# Patient Record
Sex: FEMALE | Race: WHITE | Employment: OTHER | URBAN - METROPOLITAN AREA
[De-identification: names, ages, dates, MRNs, and addresses within clinical notes are randomized per-mention and may not be internally consistent; named-entity substitution may affect disease eponyms.]

---

## 2017-01-22 ENCOUNTER — HOSPITAL ENCOUNTER (EMERGENCY)
Age: 62
Discharge: HOME | End: 2017-01-22
Attending: EMERGENCY MEDICINE
Payer: MEDICARE

## 2017-01-22 VITALS
TEMPERATURE: 98.7 F | SYSTOLIC BLOOD PRESSURE: 168 MMHG | WEIGHT: 180 LBS | HEART RATE: 86 BPM | HEIGHT: 65 IN | OXYGEN SATURATION: 96 % | DIASTOLIC BLOOD PRESSURE: 96 MMHG | RESPIRATION RATE: 18 BRPM | BODY MASS INDEX: 29.99 KG/M2

## 2017-01-22 DIAGNOSIS — M43.6 TORTICOLLIS: Primary | ICD-10-CM

## 2017-01-22 PROCEDURE — 99282 EMERGENCY DEPT VISIT SF MDM: CPT

## 2017-01-22 PROCEDURE — 6370000000 HC RX 637 (ALT 250 FOR IP): Performed by: EMERGENCY MEDICINE

## 2017-01-22 RX ORDER — HYDROCODONE BITARTRATE AND ACETAMINOPHEN 5; 325 MG/1; MG/1
1 TABLET ORAL EVERY 6 HOURS PRN
Qty: 10 TABLET | Refills: 0 | Status: SHIPPED | OUTPATIENT
Start: 2017-01-22 | End: 2017-01-29

## 2017-01-22 RX ORDER — HYDROCODONE BITARTRATE AND ACETAMINOPHEN 5; 325 MG/1; MG/1
1 TABLET ORAL ONCE
Status: COMPLETED | OUTPATIENT
Start: 2017-01-22 | End: 2017-01-22

## 2017-01-22 RX ORDER — OMEPRAZOLE 20 MG/1
20 CAPSULE, DELAYED RELEASE ORAL DAILY
COMMUNITY

## 2017-01-22 RX ORDER — VENLAFAXINE HYDROCHLORIDE 150 MG/1
150 CAPSULE, EXTENDED RELEASE ORAL DAILY
COMMUNITY

## 2017-01-22 RX ORDER — CYCLOBENZAPRINE HCL 10 MG
5 TABLET ORAL 3 TIMES DAILY PRN
Qty: 21 TABLET | Refills: 0 | Status: SHIPPED | OUTPATIENT
Start: 2017-01-22 | End: 2017-02-01

## 2017-01-22 RX ORDER — ARIPIPRAZOLE 2 MG/1
5 TABLET ORAL 2 TIMES DAILY
COMMUNITY

## 2017-01-22 RX ORDER — IBUPROFEN 600 MG/1
600 TABLET ORAL EVERY 8 HOURS PRN
Qty: 30 TABLET | Refills: 0 | Status: ON HOLD | OUTPATIENT
Start: 2017-01-22 | End: 2021-12-29 | Stop reason: HOSPADM

## 2017-01-22 RX ORDER — LEVOTHYROXINE SODIUM 0.12 MG/1
137 TABLET ORAL DAILY
COMMUNITY

## 2017-01-22 RX ORDER — METHYLPHENIDATE HYDROCHLORIDE 10 MG/1
20 TABLET ORAL 2 TIMES DAILY
Status: ON HOLD | COMMUNITY
End: 2021-12-27

## 2017-01-22 RX ORDER — ACETAMINOPHEN 500 MG
1000 TABLET ORAL ONCE
Status: COMPLETED | OUTPATIENT
Start: 2017-01-22 | End: 2017-01-22

## 2017-01-22 RX ORDER — LORAZEPAM 1 MG/1
1 TABLET ORAL EVERY 8 HOURS PRN
Status: ON HOLD | COMMUNITY
End: 2021-12-29 | Stop reason: SDUPTHER

## 2017-01-22 RX ORDER — ETODOLAC 400 MG/1
400 TABLET, FILM COATED ORAL 2 TIMES DAILY
Status: ON HOLD | COMMUNITY
End: 2021-12-29 | Stop reason: HOSPADM

## 2017-01-22 RX ORDER — CYCLOBENZAPRINE HCL 10 MG
10 TABLET ORAL ONCE
Status: COMPLETED | OUTPATIENT
Start: 2017-01-22 | End: 2017-01-22

## 2017-01-22 RX ORDER — IBUPROFEN 600 MG/1
600 TABLET ORAL ONCE
Status: COMPLETED | OUTPATIENT
Start: 2017-01-22 | End: 2017-01-22

## 2017-01-22 RX ADMIN — HYDROCODONE BITARTATE AND ACETAMINOPHEN 1 TABLET: 5; 325 TABLET ORAL at 12:26

## 2017-01-22 RX ADMIN — IBUPROFEN 600 MG: 600 TABLET ORAL at 12:26

## 2017-01-22 RX ADMIN — ACETAMINOPHEN 1000 MG: 500 TABLET ORAL at 12:26

## 2017-01-22 RX ADMIN — CYCLOBENZAPRINE HYDROCHLORIDE 10 MG: 10 TABLET, FILM COATED ORAL at 12:26

## 2017-01-22 ASSESSMENT — PAIN SCALES - GENERAL
PAINLEVEL_OUTOF10: 9
PAINLEVEL_OUTOF10: 9

## 2017-01-22 ASSESSMENT — ENCOUNTER SYMPTOMS
DIARRHEA: 0
CONSTIPATION: 0
SHORTNESS OF BREATH: 0
ABDOMINAL PAIN: 0
TROUBLE SWALLOWING: 0
RHINORRHEA: 0
BACK PAIN: 0
COUGH: 0
NAUSEA: 0
SORE THROAT: 0
VOMITING: 0

## 2017-01-22 ASSESSMENT — PAIN DESCRIPTION - LOCATION: LOCATION: NECK

## 2017-01-22 ASSESSMENT — PAIN DESCRIPTION - FREQUENCY: FREQUENCY: CONTINUOUS

## 2017-01-22 ASSESSMENT — PAIN DESCRIPTION - PAIN TYPE: TYPE: ACUTE PAIN

## 2017-01-22 ASSESSMENT — PAIN DESCRIPTION - DESCRIPTORS: DESCRIPTORS: OTHER (COMMENT);SHARP

## 2018-04-13 ENCOUNTER — HOSPITAL ENCOUNTER (OUTPATIENT)
Dept: LAB | Age: 63
Discharge: HOME | End: 2018-04-13
Payer: MEDICARE

## 2018-04-13 LAB
CHOLESTEROL, TOTAL: 222 MG/DL (ref 0–199)
HDLC SERPL-MCNC: 41 MG/DL (ref 40–59)
HEPATITIS C ANTIBODY INTERPRETATION: NORMAL
LDL CHOLESTEROL CALCULATED: 112 MG/DL (ref 0–129)
TRIGL SERPL-MCNC: 346 MG/DL (ref 0–200)

## 2018-04-13 PROCEDURE — 86803 HEPATITIS C AB TEST: CPT

## 2018-04-13 PROCEDURE — 80061 LIPID PANEL: CPT

## 2018-04-13 PROCEDURE — 36415 COLL VENOUS BLD VENIPUNCTURE: CPT

## 2020-05-20 ENCOUNTER — HOSPITAL ENCOUNTER (OUTPATIENT)
Dept: LAB | Age: 65
Discharge: HOME | End: 2020-05-20
Payer: MEDICARE

## 2020-05-20 LAB
ALBUMIN SERPL-MCNC: 4.2 G/DL (ref 3.5–4.6)
ALP BLD-CCNC: 90 U/L (ref 40–130)
ALT SERPL-CCNC: 19 U/L (ref 0–33)
ANION GAP SERPL CALCULATED.3IONS-SCNC: 12 MEQ/L (ref 9–15)
AST SERPL-CCNC: 17 U/L (ref 0–35)
BILIRUB SERPL-MCNC: 0.3 MG/DL (ref 0.2–0.7)
BUN BLDV-MCNC: 33 MG/DL (ref 8–23)
CALCIUM SERPL-MCNC: 9.8 MG/DL (ref 8.5–9.9)
CHLORIDE BLD-SCNC: 101 MEQ/L (ref 95–107)
CHOLESTEROL, TOTAL: 274 MG/DL (ref 0–199)
CO2: 26 MEQ/L (ref 20–31)
CREAT SERPL-MCNC: 0.58 MG/DL (ref 0.5–0.9)
GFR AFRICAN AMERICAN: >60
GFR NON-AFRICAN AMERICAN: >60
GLOBULIN: 3.6 G/DL (ref 2.3–3.5)
GLUCOSE BLD-MCNC: 130 MG/DL (ref 70–99)
HCT VFR BLD CALC: 34.9 % (ref 37–47)
HDLC SERPL-MCNC: 38 MG/DL (ref 40–59)
HEMOGLOBIN: 10.8 G/DL (ref 12–16)
LDL CHOLESTEROL CALCULATED: ABNORMAL MG/DL (ref 0–129)
MCH RBC QN AUTO: 25.2 PG (ref 27–31.3)
MCHC RBC AUTO-ENTMCNC: 30.9 % (ref 33–37)
MCV RBC AUTO: 81.3 FL (ref 82–100)
PDW BLD-RTO: 19.4 % (ref 11.5–14.5)
PLATELET # BLD: 569 K/UL (ref 130–400)
POTASSIUM SERPL-SCNC: 4.7 MEQ/L (ref 3.4–4.9)
RBC # BLD: 4.3 M/UL (ref 4.2–5.4)
SODIUM BLD-SCNC: 139 MEQ/L (ref 135–144)
TOTAL PROTEIN: 7.8 G/DL (ref 6.3–8)
TRIGL SERPL-MCNC: 620 MG/DL (ref 0–150)
TSH SERPL DL<=0.05 MIU/L-ACNC: 12.91 UIU/ML (ref 0.44–3.86)
WBC # BLD: 12 K/UL (ref 4.8–10.8)

## 2020-05-20 PROCEDURE — 80053 COMPREHEN METABOLIC PANEL: CPT

## 2020-05-20 PROCEDURE — 84443 ASSAY THYROID STIM HORMONE: CPT

## 2020-05-20 PROCEDURE — 36415 COLL VENOUS BLD VENIPUNCTURE: CPT

## 2020-05-20 PROCEDURE — 85027 COMPLETE CBC AUTOMATED: CPT

## 2020-05-20 PROCEDURE — 80061 LIPID PANEL: CPT

## 2020-07-02 ENCOUNTER — HOSPITAL ENCOUNTER (OUTPATIENT)
Age: 65
Discharge: HOME | End: 2020-07-04
Payer: MEDICARE

## 2020-07-02 ENCOUNTER — HOSPITAL ENCOUNTER (OUTPATIENT)
Dept: GENERAL RADIOLOGY | Age: 65
Discharge: HOME | End: 2020-07-04
Payer: MEDICARE

## 2020-07-02 PROCEDURE — 72050 X-RAY EXAM NECK SPINE 4/5VWS: CPT

## 2020-07-02 PROCEDURE — 72072 X-RAY EXAM THORAC SPINE 3VWS: CPT

## 2020-08-27 ENCOUNTER — HOSPITAL ENCOUNTER (OUTPATIENT)
Dept: LAB | Age: 65
Discharge: HOME | End: 2020-08-27
Payer: MEDICARE

## 2020-08-27 LAB
BASOPHILS ABSOLUTE: 0.1 K/UL (ref 0–0.2)
BASOPHILS RELATIVE PERCENT: 0.7 %
EOSINOPHILS ABSOLUTE: 0.9 K/UL (ref 0–0.7)
EOSINOPHILS RELATIVE PERCENT: 7.3 %
FERRITIN: 12.5 NG/ML (ref 13–150)
HCT VFR BLD CALC: 32.6 % (ref 37–47)
HEMOGLOBIN: 9.8 G/DL (ref 12–16)
HEPATITIS C ANTIBODY INTERPRETATION: NORMAL
IRON SATURATION: 12 % (ref 11–46)
IRON: 48 UG/DL (ref 37–145)
LYMPHOCYTES ABSOLUTE: 3.2 K/UL (ref 1–4.8)
LYMPHOCYTES RELATIVE PERCENT: 24.9 %
MCH RBC QN AUTO: 24 PG (ref 27–31.3)
MCHC RBC AUTO-ENTMCNC: 30.1 % (ref 33–37)
MCV RBC AUTO: 79.7 FL (ref 82–100)
MONOCYTES ABSOLUTE: 0.7 K/UL (ref 0.2–0.8)
MONOCYTES RELATIVE PERCENT: 5.8 %
NEUTROPHILS ABSOLUTE: 7.9 K/UL (ref 1.4–6.5)
NEUTROPHILS RELATIVE PERCENT: 61.3 %
PDW BLD-RTO: 18.1 % (ref 11.5–14.5)
PLATELET # BLD: 546 K/UL (ref 130–400)
RBC # BLD: 4.09 M/UL (ref 4.2–5.4)
RETICULOCYTE ABSOLUTE COUNT: 0.07 M/CUMM (ref 0.02–0.11)
RETICULOCYTE COUNT PCT: 1.8 % (ref 0.6–2.2)
TOTAL IRON BINDING CAPACITY: 416 UG/DL (ref 178–450)
WBC # BLD: 12.8 K/UL (ref 4.8–10.8)

## 2020-08-27 PROCEDURE — 83550 IRON BINDING TEST: CPT

## 2020-08-27 PROCEDURE — 85025 COMPLETE CBC W/AUTO DIFF WBC: CPT

## 2020-08-27 PROCEDURE — 83540 ASSAY OF IRON: CPT

## 2020-08-27 PROCEDURE — 36415 COLL VENOUS BLD VENIPUNCTURE: CPT

## 2020-08-27 PROCEDURE — 85046 RETICYTE/HGB CONCENTRATE: CPT

## 2020-08-27 PROCEDURE — 86803 HEPATITIS C AB TEST: CPT

## 2020-08-27 PROCEDURE — 82728 ASSAY OF FERRITIN: CPT

## 2020-10-05 ENCOUNTER — HOSPITAL ENCOUNTER (OUTPATIENT)
Dept: WOMENS IMAGING | Age: 65
Discharge: HOME | End: 2020-10-07
Payer: MEDICARE

## 2020-10-05 PROCEDURE — 77067 SCR MAMMO BI INCL CAD: CPT

## 2020-10-05 PROCEDURE — 77063 BREAST TOMOSYNTHESIS BI: CPT

## 2021-09-29 ENCOUNTER — HOSPITAL ENCOUNTER (EMERGENCY)
Age: 66
Discharge: HOME | End: 2021-09-29
Attending: EMERGENCY MEDICINE
Payer: MEDICARE

## 2021-09-29 ENCOUNTER — APPOINTMENT (OUTPATIENT)
Dept: GENERAL RADIOLOGY | Age: 66
End: 2021-09-29
Payer: MEDICARE

## 2021-09-29 VITALS
SYSTOLIC BLOOD PRESSURE: 158 MMHG | DIASTOLIC BLOOD PRESSURE: 74 MMHG | OXYGEN SATURATION: 95 % | BODY MASS INDEX: 28.32 KG/M2 | HEIGHT: 65 IN | RESPIRATION RATE: 18 BRPM | TEMPERATURE: 98.5 F | WEIGHT: 170 LBS | HEART RATE: 68 BPM

## 2021-09-29 DIAGNOSIS — J45.21 MILD INTERMITTENT ASTHMA WITH EXACERBATION: Primary | ICD-10-CM

## 2021-09-29 LAB
ALBUMIN SERPL-MCNC: 4 G/DL (ref 3.5–4.6)
ALP BLD-CCNC: 104 U/L (ref 40–130)
ALT SERPL-CCNC: 14 U/L (ref 0–33)
ANION GAP SERPL CALCULATED.3IONS-SCNC: 13 MEQ/L (ref 9–15)
AST SERPL-CCNC: 19 U/L (ref 0–35)
BASOPHILS ABSOLUTE: 0.1 K/UL (ref 0–0.1)
BASOPHILS RELATIVE PERCENT: 0.6 % (ref 0.1–1.2)
BILIRUB SERPL-MCNC: 0.3 MG/DL (ref 0.2–0.7)
BUN BLDV-MCNC: 14 MG/DL (ref 8–23)
CALCIUM SERPL-MCNC: 9.5 MG/DL (ref 8.5–9.9)
CHLORIDE BLD-SCNC: 102 MEQ/L (ref 95–107)
CO2: 24 MEQ/L (ref 20–31)
CREAT SERPL-MCNC: 0.6 MG/DL (ref 0.5–0.9)
EKG ATRIAL RATE: 60 BPM
EKG P AXIS: 49 DEGREES
EKG P-R INTERVAL: 162 MS
EKG Q-T INTERVAL: 476 MS
EKG QRS DURATION: 134 MS
EKG QTC CALCULATION (BAZETT): 476 MS
EKG R AXIS: -61 DEGREES
EKG T AXIS: -6 DEGREES
EKG VENTRICULAR RATE: 60 BPM
EOSINOPHILS ABSOLUTE: 0.5 K/UL (ref 0–0.4)
EOSINOPHILS RELATIVE PERCENT: 4.5 % (ref 0.7–5.8)
GFR AFRICAN AMERICAN: >60
GFR NON-AFRICAN AMERICAN: >60
GLOBULIN: 3.4 G/DL (ref 2.3–3.5)
GLUCOSE BLD-MCNC: 149 MG/DL (ref 70–99)
HCT VFR BLD CALC: 38.3 % (ref 37–47)
HEMOGLOBIN: 12.4 G/DL (ref 11.2–15.7)
IMMATURE GRANULOCYTES #: 0.1 K/UL
IMMATURE GRANULOCYTES %: 0.4 %
LYMPHOCYTES ABSOLUTE: 2.4 K/UL (ref 1.2–3.7)
LYMPHOCYTES RELATIVE PERCENT: 20.7 %
MAGNESIUM: 1.7 MG/DL (ref 1.7–2.4)
MCH RBC QN AUTO: 28.9 PG (ref 25.6–32.2)
MCHC RBC AUTO-ENTMCNC: 32.4 % (ref 32.2–35.5)
MCV RBC AUTO: 89.3 FL (ref 79.4–94.8)
MONOCYTES ABSOLUTE: 0.6 K/UL (ref 0.2–0.9)
MONOCYTES RELATIVE PERCENT: 5.4 % (ref 4.7–12.5)
NEUTROPHILS ABSOLUTE: 7.9 K/UL (ref 1.6–6.1)
NEUTROPHILS RELATIVE PERCENT: 68.4 % (ref 34–71.1)
PDW BLD-RTO: 14.6 % (ref 11.7–14.4)
PLATELET # BLD: 389 K/UL (ref 182–369)
POTASSIUM SERPL-SCNC: 4 MEQ/L (ref 3.4–4.9)
RBC # BLD: 4.29 M/UL (ref 3.93–5.22)
SARS-COV-2, NAAT: NOT DETECTED
SODIUM BLD-SCNC: 139 MEQ/L (ref 135–144)
TOTAL PROTEIN: 7.4 G/DL (ref 6.3–8)
TROPONIN: <0.01 NG/ML (ref 0–0.01)
WBC # BLD: 11.6 K/UL (ref 4–10)

## 2021-09-29 PROCEDURE — 6360000002 HC RX W HCPCS: Performed by: EMERGENCY MEDICINE

## 2021-09-29 PROCEDURE — 80053 COMPREHEN METABOLIC PANEL: CPT

## 2021-09-29 PROCEDURE — 6370000000 HC RX 637 (ALT 250 FOR IP): Performed by: EMERGENCY MEDICINE

## 2021-09-29 PROCEDURE — 84484 ASSAY OF TROPONIN QUANT: CPT

## 2021-09-29 PROCEDURE — 85025 COMPLETE CBC W/AUTO DIFF WBC: CPT

## 2021-09-29 PROCEDURE — 71045 X-RAY EXAM CHEST 1 VIEW: CPT

## 2021-09-29 PROCEDURE — 2580000003 HC RX 258: Performed by: EMERGENCY MEDICINE

## 2021-09-29 PROCEDURE — 93005 ELECTROCARDIOGRAM TRACING: CPT

## 2021-09-29 PROCEDURE — 94640 AIRWAY INHALATION TREATMENT: CPT

## 2021-09-29 PROCEDURE — 96374 THER/PROPH/DIAG INJ IV PUSH: CPT

## 2021-09-29 PROCEDURE — 83735 ASSAY OF MAGNESIUM: CPT

## 2021-09-29 PROCEDURE — 36415 COLL VENOUS BLD VENIPUNCTURE: CPT

## 2021-09-29 PROCEDURE — 87635 SARS-COV-2 COVID-19 AMP PRB: CPT

## 2021-09-29 PROCEDURE — 93010 ELECTROCARDIOGRAM REPORT: CPT | Performed by: INTERNAL MEDICINE

## 2021-09-29 PROCEDURE — 99283 EMERGENCY DEPT VISIT LOW MDM: CPT

## 2021-09-29 RX ORDER — METHYLPREDNISOLONE SODIUM SUCCINATE 125 MG/2ML
125 INJECTION, POWDER, LYOPHILIZED, FOR SOLUTION INTRAMUSCULAR; INTRAVENOUS ONCE
Status: COMPLETED | OUTPATIENT
Start: 2021-09-29 | End: 2021-09-29

## 2021-09-29 RX ORDER — SODIUM CHLORIDE 9 MG/ML
INJECTION, SOLUTION INTRAVENOUS CONTINUOUS
Status: DISCONTINUED | OUTPATIENT
Start: 2021-09-29 | End: 2021-09-29 | Stop reason: HOSPADM

## 2021-09-29 RX ORDER — IPRATROPIUM BROMIDE AND ALBUTEROL SULFATE 2.5; .5 MG/3ML; MG/3ML
1 SOLUTION RESPIRATORY (INHALATION) ONCE
Status: COMPLETED | OUTPATIENT
Start: 2021-09-29 | End: 2021-09-29

## 2021-09-29 RX ORDER — PREDNISONE 20 MG/1
40 TABLET ORAL DAILY
Qty: 10 TABLET | Refills: 0 | Status: SHIPPED | OUTPATIENT
Start: 2021-09-29 | End: 2021-10-04

## 2021-09-29 RX ADMIN — SODIUM CHLORIDE: 9 INJECTION, SOLUTION INTRAVENOUS at 13:02

## 2021-09-29 RX ADMIN — METHYLPREDNISOLONE SODIUM SUCCINATE 125 MG: 125 INJECTION, POWDER, FOR SOLUTION INTRAMUSCULAR; INTRAVENOUS at 13:02

## 2021-09-29 RX ADMIN — IPRATROPIUM BROMIDE AND ALBUTEROL SULFATE 1 AMPULE: .5; 3 SOLUTION RESPIRATORY (INHALATION) at 13:01

## 2021-09-29 ASSESSMENT — ENCOUNTER SYMPTOMS
ABDOMINAL PAIN: 0
FACIAL SWELLING: 0
EYE DISCHARGE: 0
CHOKING: 0
VOMITING: 0
TROUBLE SWALLOWING: 0
VOICE CHANGE: 0
BLOOD IN STOOL: 0
CHEST TIGHTNESS: 0
SORE THROAT: 0
EYE PAIN: 0
DIARRHEA: 0
SINUS PRESSURE: 0
EYE REDNESS: 0
STRIDOR: 0
WHEEZING: 1
BACK PAIN: 0
CONSTIPATION: 0
COUGH: 1
SHORTNESS OF BREATH: 1

## 2021-09-29 ASSESSMENT — PAIN DESCRIPTION - DESCRIPTORS: DESCRIPTORS: TIGHTNESS

## 2021-09-29 ASSESSMENT — PAIN DESCRIPTION - PAIN TYPE: TYPE: ACUTE PAIN

## 2021-09-29 ASSESSMENT — PAIN DESCRIPTION - LOCATION: LOCATION: CHEST

## 2021-09-29 ASSESSMENT — PAIN SCALES - GENERAL: PAINLEVEL_OUTOF10: 6

## 2021-09-29 NOTE — ED PROVIDER NOTES
[facility] ED  eMERGENCY dEPARTMENT eNCOUnter      Pt Name: Adriano Dupont  MRN: [id number]  [place] 1955  Date of evaluation: 9/29/2021  Provider: Camryn Kelly MD    [address] [place] [address]       Chief Complaint   Patient presents with    Shortness of Breath     x1 day       HISTORY OF PRESENT ILLNESS   (Location/Symptom, Timing/Onset,Context/Setting, Quality, Duration, Modifying Factors, Severity)  Note limiting factors. Adriano Dupont is a 74 y.o. female who presents to the emergency department patient complains emergency because of the has been acting up short of breath for the last 1 day time not able to produce any phlegm feels some tightness in the chest as before no history of chest tightness chest pain on exertion history of thyroid disease migraine depression anxiety and asthma patient to receive Covid vaccination no one sick at home at this time no fever no chills no nausea no vomiting    HPI    NursingNotes were reviewed. REVIEW OF SYSTEMS    (2-9 systems for level 4, 10 or more for level 5)     Review of Systems   Constitutional: Positive for activity change. Negative for fever. HENT: Negative for congestion, drooling, facial swelling, mouth sores, nosebleeds, sinus pressure, sore throat, trouble swallowing and voice change. Eyes: Negative for pain, discharge, redness and visual disturbance. Respiratory: Positive for cough, shortness of breath and wheezing. Negative for choking, chest tightness and stridor. Cardiovascular: Positive for chest pain. Negative for palpitations and leg swelling. Gastrointestinal: Negative for abdominal pain, blood in stool, constipation, diarrhea and vomiting. Endocrine: Negative for cold intolerance, polyphagia and polyuria. Genitourinary: Negative for dysuria, flank pain, frequency, genital sores and urgency. Musculoskeletal: Negative for back pain, joint swelling, neck pain and neck stiffness. Skin: Negative for pallor and rash.    Neurological: Negative for tremors, seizures, syncope, weakness, numbness and headaches. Hematological: Negative for adenopathy. Does not bruise/bleed easily. Psychiatric/Behavioral: Negative for agitation, behavioral problems, hallucinations and sleep disturbance. The patient is not hyperactive. All other systems reviewed and are negative. Except as noted above the remainder of the review of systems was reviewed and negative. PAST MEDICAL HISTORY     Past Medical History:   Diagnosis Date    Asthma     Cerebral artery occlusion with cerebral infarction ([facility] [address]     8/2021    Depression     Migraine     Thyroid disease     hypothyroid         SURGICALHISTORY       Past Surgical History:   Procedure Laterality Date    HYSTERECTOMY      KNEE SURGERY      left knee total replacement         CURRENT MEDICATIONS       Discharge Medication List as of 9/29/2021  2:23 PM      CONTINUE these medications which have NOT CHANGED    Details   ibuprofen (ADVIL;MOTRIN) 600 MG tablet Take 1 tablet by mouth every 8 hours as needed for Pain, Disp-30 tablet, R-0      ARIPiprazole (ABILIFY) 2 MG tablet Take 2 mg by mouth 2 times daily      methylphenidate (RITALIN) 10 MG tablet Take 20 mg by mouth 2 times daily . levothyroxine (SYNTHROID) 125 MCG tablet Take 125 mcg by mouth Daily      etodolac (LODINE) 400 MG tablet Take 400 mg by mouth 2 times daily      omeprazole (PRILOSEC) 20 MG delayed release capsule Take 20 mg by mouth daily      LORazepam (ATIVAN) 1 MG tablet Take 1 mg by mouth every 8 hours as needed for Anxiety      venlafaxine (EFFEXOR XR) 150 MG extended release capsule Take 150 mg by mouth daily      fluticasone-salmeterol (ADVAIR) 250-50 MCG/DOSE AEPB Inhale 1 puff into the lungs every 12 hours             ALLERGIES     Ciprofloxacin, Sulfa antibiotics, and Zithromax [azithromycin]    FAMILY HISTORY     History reviewed. No pertinent family history.        SOCIAL HISTORY       Social History     Socioeconomic History    Marital status:      Spouse name: None    Number of children: None    Years of education: None    Highest education level: None   Occupational History    None   Tobacco Use    Smoking status: Current Every Day Smoker     Types: Cigarettes    Tobacco comment: \"a cigarette and a half a day\"   Substance and Sexual Activity    Alcohol use: No    Drug use: No    Sexual activity: None   Other Topics Concern    None   Social History Narrative    None     Social Determinants of Health     Financial Resource Strain:     Difficulty of Paying Living Expenses:    Food Insecurity:     Worried About Running Out of Food in the Last Year:     Ran Out of Food in the Last Year:    Transportation Needs:     Lack of Transportation (Medical):  Lack of Transportation (Non-Medical):    Physical Activity:     Days of Exercise per Week:     Minutes of Exercise per Session:    Stress:     Feeling of Stress :    Social Connections:     Frequency of Communication with Friends and Family:     Frequency of Social Gatherings with Friends and Family:     Attends Jain Services:     Active Member of Clubs or Organizations:     Attends Club or Organization Meetings:     Marital Status:    Intimate Partner Violence:     Fear of Current or Ex-Partner:     Emotionally Abused:     Physically Abused:     Sexually Abused:        SCREENINGS      @FLOW([id number])@      PHYSICAL EXAM    (up to 7 for level 4, 8 or more for level 5)     ED Triage Vitals [09/29/21 1246]   BP Temp Temp Source Pulse Resp SpO2 Height Weight   (!) 153/80 98.5 °F (36.9 °C) Oral 66 18 94 % 5' 5\" (1.651 m) 170 lb (77.1 kg)       Physical Exam  Constitutional:       Appearance: She is well-developed and normal weight. Interventions: She is not intubated. HENT:      Head: Normocephalic. Mouth/Throat:      Mouth: Mucous membranes are moist.   Eyes:      Pupils: Pupils are equal, round, and reactive to light.    Neck: Vascular: No JVD. Cardiovascular:      Rate and Rhythm: Normal rate. Pulses: Normal pulses. Heart sounds: Normal heart sounds. No murmur heard. No gallop. Pulmonary:      Effort: Pulmonary effort is normal. No tachypnea, bradypnea or respiratory distress. She is not intubated. Breath sounds: No stridor. Examination of the right-lower field reveals wheezing. Examination of the left-lower field reveals wheezing. Wheezing present. No rhonchi or rales. Chest:      Chest wall: No mass, deformity, tenderness, crepitus or edema. There is no dullness to percussion. Abdominal:      General: Bowel sounds are normal.      Palpations: Abdomen is soft. There is no mass. Tenderness: There is no rebound. Musculoskeletal:         General: No tenderness. Normal range of motion. Cervical back: Normal range of motion and neck supple. Lymphadenopathy:      Cervical: No cervical adenopathy. Skin:     General: Skin is warm. Capillary Refill: Capillary refill takes less than 2 seconds. Coloration: Skin is not cyanotic. Findings: No ecchymosis, erythema or rash. Neurological:      General: No focal deficit present. Mental Status: She is alert and oriented to person, place, and time. Cranial Nerves: No cranial nerve deficit. Motor: No abnormal muscle tone. Psychiatric:         Behavior: Behavior normal.         Thought Content:  Thought content normal.         DIAGNOSTIC RESULTS     EKG: All EKG's are interpreted by the Emergency Department Physician who either signs or Co-signsthis chart in the absence of a cardiologist.        RADIOLOGY:   Otilio  such as CT, Ultrasound and MRI are read by the radiologist. Plain radiographic images are visualized and preliminarily interpreted by the emergency physician with the below findings:        Interpretation per the Radiologist below, if available at the time ofthis note:    XR CHEST PORTABLE   Final Result   No radiographic evidence of acute intrathoracic process. ED BEDSIDE ULTRASOUND:   Performed by ED Physician - none    LABS:  Labs Reviewed   COMPREHENSIVE METABOLIC PANEL - Abnormal; Notable for the following components:       Result Value    Glucose 149 (*)     All other components within normal limits   CBC WITH AUTO DIFFERENTIAL - Abnormal; Notable for the following components:    WBC 11.6 (*)     RDW 14.6 (*)     Platelets 044 (*)     Neutrophils Absolute 7.9 (*)     Eosinophils Absolute 0.5 (*)     All other components within normal limits   COVID-19, RAPID   MAGNESIUM   TROPONIN       All other labs were within normal range or not returned as of this dictation. EMERGENCY DEPARTMENT COURSE and DIFFERENTIAL DIAGNOSIS/MDM:   Vitals:    Vitals:    09/29/21 1246 09/29/21 1345 09/29/21 1400   BP: (!) 153/80 (!) 143/101 (!) 158/74   Pulse: 66 60 68   Resp: 18 20 18   Temp: 98.5 °F (36.9 °C)     TempSrc: Oral     SpO2: 94% 94% 95%   Weight: 170 lb (77.1 kg)     Height: 5' 5\" (1.651 m)         MDM  Number of Diagnoses or Management Options  Diagnosis management comments: Patient with history of asthma short of breath since yesterday denies any fever chills or any productive cough no one sick at home EKG performed normal sinus rhythm right bundle branch block left anterior fascicular block rate of 60/min AL interval 162 ms QRS duration 134 ms QT interval 476 ms       Amount and/or Complexity of Data Reviewed  Clinical lab tests: ordered and reviewed  Tests in the radiology section of CPT®: ordered and reviewed        CRITICAL CARE TIME   Total Critical Care time was  minutes, excluding separately reportableprocedures. There was a high probability of clinicallysignificant/life threatening deterioration in the patient's condition which required my urgent intervention. CONSULTS:  None    PROCEDURES:  Unless otherwise noted below, none     Procedures    FINAL IMPRESSION      1.  Mild intermittent asthma with exacerbation          DISPOSITION/PLAN   DISPOSITION        PATIENT REFERRED TO:  Lizeth Crespo MD  [address] [address] [phone]    In 1 week        DISCHARGE MEDICATIONS:  Discharge Medication List as of 9/29/2021  2:23 PM      START taking these medications    Details   predniSONE (DELTASONE) 20 MG tablet Take 2 tablets by mouth daily for 5 doses, Disp-10 tablet, R-0Print                (Please note that portions of this note were completed with a voice recognition program.  Efforts were made to edit the dictations but occasionally words are mis-transcribed.)    Lisa Chaudhry MD (electronically signed)  Attending Emergency Physician       Lisa Chaudhry MD  09/29/21 6147

## 2021-12-27 ENCOUNTER — APPOINTMENT (OUTPATIENT)
Dept: GENERAL RADIOLOGY | Age: 66
DRG: 191 | End: 2021-12-27
Payer: MEDICARE

## 2021-12-27 ENCOUNTER — HOSPITAL ENCOUNTER (INPATIENT)
Age: 66
LOS: 2 days | Discharge: HOME | DRG: 191 | End: 2021-12-29
Attending: EMERGENCY MEDICINE | Admitting: INTERNAL MEDICINE
Payer: MEDICARE

## 2021-12-27 DIAGNOSIS — J45.41 MODERATE PERSISTENT ASTHMA WITH EXACERBATION: Primary | ICD-10-CM

## 2021-12-27 DIAGNOSIS — G62.9 NEUROPATHY: ICD-10-CM

## 2021-12-27 DIAGNOSIS — F41.9 ANXIETY: ICD-10-CM

## 2021-12-27 PROBLEM — J44.1 COPD EXACERBATION (HCC): Status: ACTIVE | Noted: 2021-12-27

## 2021-12-27 LAB
ALBUMIN SERPL-MCNC: 4.2 G/DL (ref 3.5–4.6)
ALP BLD-CCNC: 97 U/L (ref 40–130)
ALT SERPL-CCNC: 18 U/L (ref 0–33)
ANION GAP SERPL CALCULATED.3IONS-SCNC: 16 MEQ/L (ref 9–15)
AST SERPL-CCNC: 19 U/L (ref 0–35)
BACTERIA: ABNORMAL /HPF
BASOPHILS ABSOLUTE: 0 K/UL (ref 0–0.1)
BASOPHILS RELATIVE PERCENT: 0.3 % (ref 0.1–1.2)
BILIRUB SERPL-MCNC: 0.3 MG/DL (ref 0.2–0.7)
BILIRUBIN URINE: NEGATIVE
BLOOD, URINE: ABNORMAL
BUN BLDV-MCNC: 18 MG/DL (ref 8–23)
CALCIUM SERPL-MCNC: 9.4 MG/DL (ref 8.5–9.9)
CHLORIDE BLD-SCNC: 102 MEQ/L (ref 95–107)
CLARITY: ABNORMAL
CO2: 22 MEQ/L (ref 20–31)
COLOR: YELLOW
CREAT SERPL-MCNC: 0.45 MG/DL (ref 0.5–0.9)
EOSINOPHILS ABSOLUTE: 0.4 K/UL (ref 0–0.4)
EOSINOPHILS RELATIVE PERCENT: 3.9 % (ref 0.7–5.8)
EPITHELIAL CELLS, UA: ABNORMAL /HPF
GFR AFRICAN AMERICAN: >60
GFR NON-AFRICAN AMERICAN: >60
GLOBULIN: 3 G/DL (ref 2.3–3.5)
GLUCOSE BLD-MCNC: 120 MG/DL (ref 70–99)
GLUCOSE URINE: NEGATIVE MG/DL
HCT VFR BLD CALC: 36.9 % (ref 37–47)
HEMOGLOBIN: 12.1 G/DL (ref 11.2–15.7)
IMMATURE GRANULOCYTES #: 0.1 K/UL
IMMATURE GRANULOCYTES %: 0.4 %
INR BLD: 1
KETONES, URINE: NEGATIVE MG/DL
LEUKOCYTE ESTERASE, URINE: NEGATIVE
LYMPHOCYTES ABSOLUTE: 2 K/UL (ref 1.2–3.7)
LYMPHOCYTES RELATIVE PERCENT: 18.1 %
MAGNESIUM: 1.7 MG/DL (ref 1.7–2.4)
MCH RBC QN AUTO: 28.9 PG (ref 25.6–32.2)
MCHC RBC AUTO-ENTMCNC: 32.8 % (ref 32.2–35.5)
MCV RBC AUTO: 88.3 FL (ref 79.4–94.8)
MONOCYTES ABSOLUTE: 0.6 K/UL (ref 0.2–0.9)
MONOCYTES RELATIVE PERCENT: 5 % (ref 4.7–12.5)
NEUTROPHILS ABSOLUTE: 8.1 K/UL (ref 1.6–6.1)
NEUTROPHILS RELATIVE PERCENT: 72.3 % (ref 34–71.1)
NITRITE, URINE: POSITIVE
PDW BLD-RTO: 13.8 % (ref 11.7–14.4)
PH UA: 5.5 (ref 5–9)
PLATELET # BLD: 376 K/UL (ref 182–369)
POTASSIUM SERPL-SCNC: 3.9 MEQ/L (ref 3.4–4.9)
PRO-BNP: 247 PG/ML
PROTEIN UA: 30 MG/DL
PROTHROMBIN TIME: 13.2 SEC (ref 12.3–14.9)
RBC # BLD: 4.18 M/UL (ref 3.93–5.22)
RBC UA: ABNORMAL /HPF (ref 0–2)
SARS-COV-2, NAAT: NOT DETECTED
SODIUM BLD-SCNC: 140 MEQ/L (ref 135–144)
SPECIFIC GRAVITY UA: 1.02 (ref 1–1.03)
TOTAL PROTEIN: 7.2 G/DL (ref 6.3–8)
TROPONIN: <0.01 NG/ML (ref 0–0.01)
URINE REFLEX TO CULTURE: ABNORMAL
UROBILINOGEN, URINE: 0.2 E.U./DL
WBC # BLD: 11.2 K/UL (ref 4–10)
WBC UA: ABNORMAL /HPF (ref 0–5)

## 2021-12-27 PROCEDURE — 85610 PROTHROMBIN TIME: CPT

## 2021-12-27 PROCEDURE — 6370000000 HC RX 637 (ALT 250 FOR IP): Performed by: INTERNAL MEDICINE

## 2021-12-27 PROCEDURE — 94640 AIRWAY INHALATION TREATMENT: CPT

## 2021-12-27 PROCEDURE — 84484 ASSAY OF TROPONIN QUANT: CPT

## 2021-12-27 PROCEDURE — 99284 EMERGENCY DEPT VISIT MOD MDM: CPT

## 2021-12-27 PROCEDURE — 2580000003 HC RX 258: Performed by: EMERGENCY MEDICINE

## 2021-12-27 PROCEDURE — 81001 URINALYSIS AUTO W/SCOPE: CPT

## 2021-12-27 PROCEDURE — 1210000000 HC MED SURG R&B

## 2021-12-27 PROCEDURE — 87040 BLOOD CULTURE FOR BACTERIA: CPT

## 2021-12-27 PROCEDURE — 80053 COMPREHEN METABOLIC PANEL: CPT

## 2021-12-27 PROCEDURE — 2580000003 HC RX 258: Performed by: INTERNAL MEDICINE

## 2021-12-27 PROCEDURE — 6370000000 HC RX 637 (ALT 250 FOR IP): Performed by: EMERGENCY MEDICINE

## 2021-12-27 PROCEDURE — 83880 ASSAY OF NATRIURETIC PEPTIDE: CPT

## 2021-12-27 PROCEDURE — 2500000003 HC RX 250 WO HCPCS: Performed by: INTERNAL MEDICINE

## 2021-12-27 PROCEDURE — 96375 TX/PRO/DX INJ NEW DRUG ADDON: CPT

## 2021-12-27 PROCEDURE — 85025 COMPLETE CBC W/AUTO DIFF WBC: CPT

## 2021-12-27 PROCEDURE — 71045 X-RAY EXAM CHEST 1 VIEW: CPT

## 2021-12-27 PROCEDURE — 96374 THER/PROPH/DIAG INJ IV PUSH: CPT

## 2021-12-27 PROCEDURE — 93005 ELECTROCARDIOGRAM TRACING: CPT

## 2021-12-27 PROCEDURE — 6360000002 HC RX W HCPCS: Performed by: EMERGENCY MEDICINE

## 2021-12-27 PROCEDURE — 83735 ASSAY OF MAGNESIUM: CPT

## 2021-12-27 PROCEDURE — 36415 COLL VENOUS BLD VENIPUNCTURE: CPT

## 2021-12-27 RX ORDER — POLYETHYLENE GLYCOL 3350 17 G/17G
17 POWDER, FOR SOLUTION ORAL DAILY PRN
Status: DISCONTINUED | OUTPATIENT
Start: 2021-12-27 | End: 2021-12-29 | Stop reason: HOSPADM

## 2021-12-27 RX ORDER — GUAIFENESIN 600 MG/1
600 TABLET, EXTENDED RELEASE ORAL 3 TIMES DAILY PRN
Status: DISCONTINUED | OUTPATIENT
Start: 2021-12-27 | End: 2021-12-29 | Stop reason: HOSPADM

## 2021-12-27 RX ORDER — ALBUTEROL SULFATE 2.5 MG/3ML
2.5 SOLUTION RESPIRATORY (INHALATION) EVERY 4 HOURS PRN
Status: DISCONTINUED | OUTPATIENT
Start: 2021-12-27 | End: 2021-12-29 | Stop reason: HOSPADM

## 2021-12-27 RX ORDER — MAGNESIUM SULFATE 1 G/100ML
1000 INJECTION INTRAVENOUS ONCE
Status: COMPLETED | OUTPATIENT
Start: 2021-12-27 | End: 2021-12-27

## 2021-12-27 RX ORDER — SODIUM CHLORIDE 0.9 % (FLUSH) 0.9 %
3 SYRINGE (ML) INJECTION EVERY 8 HOURS
Status: DISCONTINUED | OUTPATIENT
Start: 2021-12-27 | End: 2021-12-29 | Stop reason: HOSPADM

## 2021-12-27 RX ORDER — IPRATROPIUM BROMIDE AND ALBUTEROL SULFATE 2.5; .5 MG/3ML; MG/3ML
1 SOLUTION RESPIRATORY (INHALATION) ONCE
Status: COMPLETED | OUTPATIENT
Start: 2021-12-27 | End: 2021-12-27

## 2021-12-27 RX ORDER — METHYLPHENIDATE HYDROCHLORIDE 5 MG/1
20 TABLET ORAL 2 TIMES DAILY
Status: DISCONTINUED | OUTPATIENT
Start: 2021-12-27 | End: 2021-12-28

## 2021-12-27 RX ORDER — PANTOPRAZOLE SODIUM 40 MG/1
40 TABLET, DELAYED RELEASE ORAL
Status: DISCONTINUED | OUTPATIENT
Start: 2021-12-28 | End: 2021-12-29 | Stop reason: HOSPADM

## 2021-12-27 RX ORDER — ONDANSETRON 2 MG/ML
4 INJECTION INTRAMUSCULAR; INTRAVENOUS ONCE
Status: COMPLETED | OUTPATIENT
Start: 2021-12-27 | End: 2021-12-27

## 2021-12-27 RX ORDER — LORAZEPAM 0.5 MG/1
0.5 TABLET ORAL EVERY 8 HOURS PRN
Status: DISCONTINUED | OUTPATIENT
Start: 2021-12-27 | End: 2021-12-29 | Stop reason: HOSPADM

## 2021-12-27 RX ORDER — SODIUM CHLORIDE 0.9 % (FLUSH) 0.9 %
10 SYRINGE (ML) INJECTION PRN
Status: DISCONTINUED | OUTPATIENT
Start: 2021-12-27 | End: 2021-12-29 | Stop reason: HOSPADM

## 2021-12-27 RX ORDER — LEVOTHYROXINE SODIUM 0.12 MG/1
125 TABLET ORAL DAILY
Status: DISCONTINUED | OUTPATIENT
Start: 2021-12-28 | End: 2021-12-29 | Stop reason: HOSPADM

## 2021-12-27 RX ORDER — ACETAMINOPHEN 325 MG/1
650 TABLET ORAL EVERY 6 HOURS PRN
Status: DISCONTINUED | OUTPATIENT
Start: 2021-12-27 | End: 2021-12-29 | Stop reason: HOSPADM

## 2021-12-27 RX ORDER — ACETAMINOPHEN 650 MG/1
650 SUPPOSITORY RECTAL EVERY 6 HOURS PRN
Status: DISCONTINUED | OUTPATIENT
Start: 2021-12-27 | End: 2021-12-29 | Stop reason: HOSPADM

## 2021-12-27 RX ORDER — METHYLPREDNISOLONE SODIUM SUCCINATE 125 MG/2ML
125 INJECTION, POWDER, LYOPHILIZED, FOR SOLUTION INTRAMUSCULAR; INTRAVENOUS ONCE
Status: COMPLETED | OUTPATIENT
Start: 2021-12-27 | End: 2021-12-27

## 2021-12-27 RX ORDER — SODIUM CHLORIDE 0.9 % (FLUSH) 0.9 %
10 SYRINGE (ML) INJECTION EVERY 12 HOURS SCHEDULED
Status: DISCONTINUED | OUTPATIENT
Start: 2021-12-27 | End: 2021-12-29 | Stop reason: HOSPADM

## 2021-12-27 RX ORDER — ARIPIPRAZOLE 2 MG/1
2 TABLET ORAL 2 TIMES DAILY
Status: DISCONTINUED | OUTPATIENT
Start: 2021-12-27 | End: 2021-12-29 | Stop reason: HOSPADM

## 2021-12-27 RX ORDER — VENLAFAXINE HYDROCHLORIDE 150 MG/1
150 CAPSULE, EXTENDED RELEASE ORAL DAILY
Status: DISCONTINUED | OUTPATIENT
Start: 2021-12-27 | End: 2021-12-29 | Stop reason: HOSPADM

## 2021-12-27 RX ORDER — SODIUM CHLORIDE 9 MG/ML
25 INJECTION, SOLUTION INTRAVENOUS PRN
Status: DISCONTINUED | OUTPATIENT
Start: 2021-12-27 | End: 2021-12-29 | Stop reason: HOSPADM

## 2021-12-27 RX ORDER — IPRATROPIUM BROMIDE AND ALBUTEROL SULFATE 2.5; .5 MG/3ML; MG/3ML
1 SOLUTION RESPIRATORY (INHALATION) 3 TIMES DAILY
Status: DISCONTINUED | OUTPATIENT
Start: 2021-12-27 | End: 2021-12-29 | Stop reason: HOSPADM

## 2021-12-27 RX ORDER — METHYLPREDNISOLONE SODIUM SUCCINATE 40 MG/ML
40 INJECTION, POWDER, LYOPHILIZED, FOR SOLUTION INTRAMUSCULAR; INTRAVENOUS EVERY 12 HOURS
Status: DISCONTINUED | OUTPATIENT
Start: 2021-12-27 | End: 2021-12-29 | Stop reason: HOSPADM

## 2021-12-27 RX ADMIN — VENLAFAXINE HYDROCHLORIDE 150 MG: 150 CAPSULE, EXTENDED RELEASE ORAL at 20:19

## 2021-12-27 RX ADMIN — DOXYCYCLINE 100 MG: 100 INJECTION, POWDER, LYOPHILIZED, FOR SOLUTION INTRAVENOUS at 21:13

## 2021-12-27 RX ADMIN — ARIPIPRAZOLE 2 MG: 2 TABLET ORAL at 20:04

## 2021-12-27 RX ADMIN — ONDANSETRON 4 MG: 2 INJECTION INTRAMUSCULAR; INTRAVENOUS at 17:08

## 2021-12-27 RX ADMIN — METHYLPREDNISOLONE SODIUM SUCCINATE 125 MG: 125 INJECTION, POWDER, FOR SOLUTION INTRAMUSCULAR; INTRAVENOUS at 17:08

## 2021-12-27 RX ADMIN — DEXTROSE MONOHYDRATE 1000 MG: 50 INJECTION, SOLUTION INTRAVENOUS at 20:16

## 2021-12-27 RX ADMIN — ACETAMINOPHEN 650 MG: 325 TABLET ORAL at 19:57

## 2021-12-27 RX ADMIN — IPRATROPIUM BROMIDE AND ALBUTEROL SULFATE 1 AMPULE: .5; 2.5 SOLUTION RESPIRATORY (INHALATION) at 18:29

## 2021-12-27 RX ADMIN — MAGNESIUM SULFATE HEPTAHYDRATE 1000 MG: 1 INJECTION, SOLUTION INTRAVENOUS at 22:21

## 2021-12-27 RX ADMIN — Medication 3 ML: at 23:29

## 2021-12-27 RX ADMIN — Medication 3 ML: at 17:08

## 2021-12-27 ASSESSMENT — ENCOUNTER SYMPTOMS
EYE PAIN: 0
EYE DISCHARGE: 0
CONSTIPATION: 0
VOMITING: 0
CHEST TIGHTNESS: 0
STRIDOR: 0
SINUS PRESSURE: 0
WHEEZING: 1
DIARRHEA: 0
EYE REDNESS: 0
BLOOD IN STOOL: 0
FACIAL SWELLING: 0
CHOKING: 0
BACK PAIN: 0
ABDOMINAL PAIN: 0
VOICE CHANGE: 0
COUGH: 1
TROUBLE SWALLOWING: 0
SHORTNESS OF BREATH: 1
SORE THROAT: 0
NAUSEA: 1

## 2021-12-27 ASSESSMENT — PAIN SCALES - GENERAL
PAINLEVEL_OUTOF10: 0
PAINLEVEL_OUTOF10: 6

## 2021-12-27 ASSESSMENT — PAIN DESCRIPTION - PAIN TYPE: TYPE: ACUTE PAIN

## 2021-12-27 ASSESSMENT — PAIN DESCRIPTION - LOCATION: LOCATION: HEAD

## 2021-12-27 NOTE — ED NOTES
Bed: 03  Expected date: 12/27/21  Expected time:   Means of arrival:   Comments:     Puma Flanagan  12/27/21 8085

## 2021-12-27 NOTE — ED PROVIDER NOTES
[facility] ED  eMERGENCY dEPARTMENT eNCOUnter      Pt Name: Ursula Byers  MRN: [id number]  [place] 1955  Date of evaluation: 12/27/2021  Provider: Dianelys Albrecht MD    [address] [place] [address]       Chief Complaint   Patient presents with    Shortness of Breath     x 4 hours, inhailer without help     HISTORY OF PRESENT ILLNESS   (Location/Symptom, Timing/Onset,Context/Setting, Quality, Duration, Modifying Factors, Severity)  Note limiting factors. Ursula Byers is a 74 y.o. female who presents to the emergency department patient come to the emergency because of increasing short of breath for the last 3 to 4 days time getting worse patient has inhaler at home no oxygen at home short of breath patient denies any chest tightness or chest pain denies any swelling of the legs document fever patient bringing up phlegm she is clear in nature patient has a history of asthma anxiety depression thyroid disease as per history  HPI    NursingNotes were reviewed. REVIEW OF SYSTEMS    (2-9 systems for level 4, 10 or more for level 5)     Review of Systems   Constitutional: Positive for activity change, appetite change, chills, diaphoresis, fatigue and fever. HENT: Positive for congestion. Negative for drooling, facial swelling, mouth sores, nosebleeds, sinus pressure, sore throat, trouble swallowing and voice change. Eyes: Negative for pain, discharge, redness and visual disturbance. Respiratory: Positive for cough, shortness of breath and wheezing. Negative for choking, chest tightness and stridor. Cardiovascular: Negative for chest pain, palpitations and leg swelling. Gastrointestinal: Positive for nausea. Negative for abdominal pain, blood in stool, constipation, diarrhea and vomiting. Endocrine: Negative for cold intolerance, polyphagia and polyuria. Genitourinary: Negative for dysuria, flank pain, frequency, genital sores and urgency.    Musculoskeletal: Negative for back pain, joint swelling, neck pain and neck stiffness. Skin: Negative for pallor and rash. Neurological: Negative for tremors, seizures, syncope, weakness, numbness and headaches. Hematological: Negative for adenopathy. Does not bruise/bleed easily. Psychiatric/Behavioral: Negative for agitation, behavioral problems, hallucinations and sleep disturbance. The patient is not hyperactive. All other systems reviewed and are negative. Except as noted above the remainder of the review of systems was reviewed and negative. PAST MEDICAL HISTORY     Past Medical History:   Diagnosis Date    Asthma     Cerebral artery occlusion with cerebral infarction ([facility] [address]     8/2021    Depression     Migraine     Thyroid disease     hypothyroid         SURGICALHISTORY       Past Surgical History:   Procedure Laterality Date    HYSTERECTOMY      KNEE SURGERY      left knee total replacement         CURRENT MEDICATIONS       Previous Medications    ARIPIPRAZOLE (ABILIFY) 2 MG TABLET    Take 2 mg by mouth 2 times daily    ETODOLAC (LODINE) 400 MG TABLET    Take 400 mg by mouth 2 times daily    FLUTICASONE-SALMETEROL (ADVAIR) 250-50 MCG/DOSE AEPB    Inhale 1 puff into the lungs every 12 hours    IBUPROFEN (ADVIL;MOTRIN) 600 MG TABLET    Take 1 tablet by mouth every 8 hours as needed for Pain    LEVOTHYROXINE (SYNTHROID) 125 MCG TABLET    Take 125 mcg by mouth Daily    LORAZEPAM (ATIVAN) 1 MG TABLET    Take 1 mg by mouth every 8 hours as needed for Anxiety    METHYLPHENIDATE (RITALIN) 10 MG TABLET    Take 20 mg by mouth 2 times daily . OMEPRAZOLE (PRILOSEC) 20 MG DELAYED RELEASE CAPSULE    Take 20 mg by mouth daily    VENLAFAXINE (EFFEXOR XR) 150 MG EXTENDED RELEASE CAPSULE    Take 150 mg by mouth daily       ALLERGIES     Ciprofloxacin, Sulfa antibiotics, and Zithromax [azithromycin]    FAMILY HISTORY     History reviewed. No pertinent family history.        SOCIAL HISTORY       Social History     Socioeconomic History    Marital status:      Spouse name: None    Number of children: None    Years of education: None    Highest education level: None   Occupational History    None   Tobacco Use    Smoking status: Current Every Day Smoker     Types: Cigarettes    Smokeless tobacco: Never Used    Tobacco comment: \"a cigarette and a half a day\"   Vaping Use    Vaping Use: Never used   Substance and Sexual Activity    Alcohol use: No    Drug use: No    Sexual activity: None   Other Topics Concern    None   Social History Narrative    None     Social Determinants of Health     Financial Resource Strain:     Difficulty of Paying Living Expenses: Not on file   Food Insecurity:     Worried About Running Out of Food in the Last Year: Not on file    Maggy of Food in the Last Year: Not on file   Transportation Needs:     Lack of Transportation (Medical): Not on file    Lack of Transportation (Non-Medical):  Not on file   Physical Activity:     Days of Exercise per Week: Not on file    Minutes of Exercise per Session: Not on file   Stress:     Feeling of Stress : Not on file   Social Connections:     Frequency of Communication with Friends and Family: Not on file    Frequency of Social Gatherings with Friends and Family: Not on file    Attends Anglican Services: Not on file    Active Member of [address] [place] [address] or Organizations: Not on file    Attends Club or Organization Meetings: Not on file    Marital Status: Not on file   Intimate Partner Violence:     Fear of Current or Ex-Partner: Not on file    Emotionally Abused: Not on file    Physically Abused: Not on file    Sexually Abused: Not on file   Housing Stability:     Unable to Pay for Housing in the Last Year: Not on file    Number of [place] in the Last Year: Not on file    Unstable Housing in the Last Year: Not on file       SCREENINGS      @FLOW([id number])@      PHYSICAL EXAM    (up to 7 for level 4, 8 or more for level 5)     ED Triage Vitals [12/27/21 1554]   BP Temp Temp Source Pulse Resp SpO2 Height Weight   (!) 118/102 96.7 °F (35.9 °C) Temporal 83 18 95 % 5' 5\" (1.651 m) 150 lb (68 kg)       Physical Exam  Vitals and nursing note reviewed. Constitutional:       General: She is not in acute distress. Appearance: She is well-developed. Comments: Alert cooperative patient looks in sound very congested nasally frequent bouts of coughing noted able to talk in full sentences   HENT:      Head: Normocephalic. Eyes:      Pupils: Pupils are equal, round, and reactive to light. Cardiovascular:      Rate and Rhythm: Normal rate and regular rhythm. Heart sounds: Normal heart sounds. No murmur heard. No gallop. Pulmonary:      Effort: Tachypnea present. No respiratory distress. Breath sounds: Examination of the right-lower field reveals wheezing. Examination of the left-lower field reveals wheezing. Decreased breath sounds and wheezing present. No rales. Chest:      Chest wall: No tenderness or edema. Abdominal:      General: Bowel sounds are normal.      Palpations: Abdomen is soft. There is no mass. Tenderness: There is no guarding or rebound. Musculoskeletal:         General: No tenderness. Normal range of motion. Cervical back: Normal range of motion and neck supple. Right lower leg: No tenderness. No edema. Left lower leg: No tenderness. No edema. Skin:     General: Skin is warm. Findings: No ecchymosis, erythema or rash. Neurological:      General: No focal deficit present. Mental Status: She is alert and oriented to person, place, and time. Cranial Nerves: No cranial nerve deficit. Motor: No abnormal muscle tone. Psychiatric:         Mood and Affect: Mood is anxious. Behavior: Behavior normal.         Thought Content:  Thought content normal.         DIAGNOSTIC RESULTS     EKG: All EKG's are interpreted by the Emergency Department Physician who either signs or Co-signsthis chart in the absence of a cardiologist.        RADIOLOGY:   Pattie August such as CT, Ultrasound and MRI are read by the radiologist. Plain radiographic images are visualized and preliminarily interpreted by the emergency physician with the below findings:        Interpretation per the Radiologist below, if available at the time ofthis note:    XR CHEST PORTABLE    (Results Pending)         ED BEDSIDE ULTRASOUND:   Performed by ED Physician - none    LABS:  Labs Reviewed   COMPREHENSIVE METABOLIC PANEL - Abnormal; Notable for the following components:       Result Value    Anion Gap 16 (*)     Glucose 120 (*)     CREATININE 0.45 (*)     All other components within normal limits   CBC WITH AUTO DIFFERENTIAL - Abnormal; Notable for the following components:    WBC 11.2 (*)     Hematocrit 36.9 (*)     Platelets 941 (*)     Neutrophils % 72.3 (*)     Neutrophils Absolute 8.1 (*)     All other components within normal limits   COVID-19, RAPID   CULTURE, BLOOD 1   CULTURE, BLOOD 2   MAGNESIUM   TROPONIN   BRAIN NATRIURETIC PEPTIDE   PROTIME-INR   URINE RT REFLEX TO CULTURE       All other labs were within normal range or not returned as of this dictation.     EMERGENCY DEPARTMENT COURSE and DIFFERENTIAL DIAGNOSIS/MDM:   Vitals:    Vitals:    12/27/21 1554 12/27/21 1831   BP: (!) 118/102    Pulse: 83    Resp: 18    Temp: 96.7 °F (35.9 °C)    TempSrc: Temporal    SpO2: 95% 98%   Weight: 150 lb (68 kg)    Height: 5' 5\" (1.651 m)        MDM  Number of Diagnoses or Management Options  Diagnosis management comments: Is asthma exacerbation wheezing bilaterally despite taking inhaler at home no oxygen at home short of breath brought her here last 3 to 4 days time productive cough EKG performed has similar EKG before in month of September this year 2021 with the bifascicular block normal sinus rhythm rate of 78/min CT interval 128 ms QRS duration 130 ms and QT interval 434 ms      CRITICAL CARE TIME   Total Critical Care time was  minutes, excluding separately reportableprocedures. There was a high probability of clinicallysignificant/life threatening deterioration in the patient's condition which required my urgent intervention. CONSULTS:  IP CONSULT TO HOSPITALIST    PROCEDURES:  Unless otherwise noted below, none     Procedures    FINAL IMPRESSION      1. Moderate persistent asthma with exacerbation          DISPOSITION/PLAN   DISPOSITION        PATIENT REFERRED TO:  No follow-up provider specified.     DISCHARGE MEDICATIONS:  New Prescriptions    No medications on file          (Please note that portions of this note were completed with a voice recognition program.  Efforts were made to edit the dictations but occasionally words are mis-transcribed.)    Karen Gatica MD (electronically signed)  Attending Emergency Physician       Karen Gatica MD  12/27/21 5734

## 2021-12-27 NOTE — ED TRIAGE NOTES
Patient states she is short of breath and has used her albuterol inhaler with out any relief. Has a dry cough. Wheezing left base and diminished right lower lobe.

## 2021-12-28 ENCOUNTER — APPOINTMENT (OUTPATIENT)
Dept: CT IMAGING | Age: 66
DRG: 191 | End: 2021-12-28
Payer: MEDICARE

## 2021-12-28 ENCOUNTER — APPOINTMENT (OUTPATIENT)
Dept: ULTRASOUND IMAGING | Age: 66
DRG: 191 | End: 2021-12-28
Payer: MEDICARE

## 2021-12-28 LAB
ANION GAP SERPL CALCULATED.3IONS-SCNC: 14 MEQ/L (ref 9–15)
BUN BLDV-MCNC: 17 MG/DL (ref 8–23)
CALCIUM SERPL-MCNC: 9.4 MG/DL (ref 8.5–9.9)
CHLORIDE BLD-SCNC: 103 MEQ/L (ref 95–107)
CO2: 23 MEQ/L (ref 20–31)
CREAT SERPL-MCNC: 0.49 MG/DL (ref 0.5–0.9)
D DIMER: 3.87 MG/L FEU (ref 0–0.5)
EKG ATRIAL RATE: 78 BPM
EKG P AXIS: 3 DEGREES
EKG P-R INTERVAL: 128 MS
EKG Q-T INTERVAL: 434 MS
EKG QRS DURATION: 130 MS
EKG QTC CALCULATION (BAZETT): 494 MS
EKG R AXIS: -61 DEGREES
EKG T AXIS: 3 DEGREES
EKG VENTRICULAR RATE: 78 BPM
GFR AFRICAN AMERICAN: >60
GFR NON-AFRICAN AMERICAN: >60
GLUCOSE BLD-MCNC: 160 MG/DL (ref 70–99)
GLUCOSE BLD-MCNC: 162 MG/DL (ref 60–115)
HCT VFR BLD CALC: 38.2 % (ref 37–47)
HEMOGLOBIN: 12.4 G/DL (ref 11.2–15.7)
MCH RBC QN AUTO: 28.6 PG (ref 25.6–32.2)
MCHC RBC AUTO-ENTMCNC: 32.5 % (ref 32.2–35.5)
MCV RBC AUTO: 88.2 FL (ref 79.4–94.8)
PDW BLD-RTO: 13.8 % (ref 11.7–14.4)
PERFORMED ON: ABNORMAL
PLATELET # BLD: 433 K/UL (ref 182–369)
POTASSIUM REFLEX MAGNESIUM: 4.3 MEQ/L (ref 3.4–4.9)
RBC # BLD: 4.33 M/UL (ref 3.93–5.22)
SODIUM BLD-SCNC: 140 MEQ/L (ref 135–144)
WBC # BLD: 13.4 K/UL (ref 4–10)

## 2021-12-28 PROCEDURE — 2580000003 HC RX 258: Performed by: EMERGENCY MEDICINE

## 2021-12-28 PROCEDURE — 2700000000 HC OXYGEN THERAPY PER DAY

## 2021-12-28 PROCEDURE — 97116 GAIT TRAINING THERAPY: CPT

## 2021-12-28 PROCEDURE — 2500000003 HC RX 250 WO HCPCS: Performed by: INTERNAL MEDICINE

## 2021-12-28 PROCEDURE — 97162 PT EVAL MOD COMPLEX 30 MIN: CPT

## 2021-12-28 PROCEDURE — 93010 ELECTROCARDIOGRAM REPORT: CPT | Performed by: INTERNAL MEDICINE

## 2021-12-28 PROCEDURE — 97166 OT EVAL MOD COMPLEX 45 MIN: CPT

## 2021-12-28 PROCEDURE — 97161 PT EVAL LOW COMPLEX 20 MIN: CPT

## 2021-12-28 PROCEDURE — 6370000000 HC RX 637 (ALT 250 FOR IP): Performed by: INTERNAL MEDICINE

## 2021-12-28 PROCEDURE — 36415 COLL VENOUS BLD VENIPUNCTURE: CPT

## 2021-12-28 PROCEDURE — 6360000004 HC RX CONTRAST MEDICATION: Performed by: INTERNAL MEDICINE

## 2021-12-28 PROCEDURE — 85027 COMPLETE CBC AUTOMATED: CPT

## 2021-12-28 PROCEDURE — 80048 BASIC METABOLIC PNL TOTAL CA: CPT

## 2021-12-28 PROCEDURE — 93970 EXTREMITY STUDY: CPT

## 2021-12-28 PROCEDURE — 85379 FIBRIN DEGRADATION QUANT: CPT

## 2021-12-28 PROCEDURE — 97530 THERAPEUTIC ACTIVITIES: CPT

## 2021-12-28 PROCEDURE — 1210000000 HC MED SURG R&B

## 2021-12-28 PROCEDURE — 2580000003 HC RX 258: Performed by: INTERNAL MEDICINE

## 2021-12-28 PROCEDURE — 6360000002 HC RX W HCPCS: Performed by: INTERNAL MEDICINE

## 2021-12-28 PROCEDURE — 71275 CT ANGIOGRAPHY CHEST: CPT

## 2021-12-28 PROCEDURE — 94640 AIRWAY INHALATION TREATMENT: CPT

## 2021-12-28 RX ORDER — FERROUS SULFATE 325(65) MG
325 TABLET ORAL
COMMUNITY

## 2021-12-28 RX ORDER — CLOPIDOGREL BISULFATE 75 MG/1
75 TABLET ORAL DAILY
COMMUNITY

## 2021-12-28 RX ORDER — LANOLIN ALCOHOL/MO/W.PET/CERES
5000 CREAM (GRAM) TOPICAL DAILY
COMMUNITY

## 2021-12-28 RX ORDER — NICOTINE 21 MG/24HR
1 PATCH, TRANSDERMAL 24 HOURS TRANSDERMAL DAILY
Status: DISCONTINUED | OUTPATIENT
Start: 2021-12-28 | End: 2021-12-29 | Stop reason: HOSPADM

## 2021-12-28 RX ORDER — LISINOPRIL 5 MG/1
5 TABLET ORAL DAILY
COMMUNITY

## 2021-12-28 RX ORDER — ASPIRIN 81 MG/1
81 TABLET, CHEWABLE ORAL DAILY
COMMUNITY

## 2021-12-28 RX ORDER — ROSUVASTATIN CALCIUM 5 MG/1
5 TABLET, COATED ORAL DAILY
Status: DISCONTINUED | OUTPATIENT
Start: 2021-12-29 | End: 2021-12-29 | Stop reason: HOSPADM

## 2021-12-28 RX ORDER — METOPROLOL SUCCINATE 50 MG/1
50 TABLET, EXTENDED RELEASE ORAL DAILY
COMMUNITY

## 2021-12-28 RX ORDER — LISINOPRIL 5 MG/1
5 TABLET ORAL DAILY
Status: DISCONTINUED | OUTPATIENT
Start: 2021-12-28 | End: 2021-12-29 | Stop reason: HOSPADM

## 2021-12-28 RX ORDER — ROSUVASTATIN CALCIUM 5 MG/1
5 TABLET, COATED ORAL NIGHTLY
Status: DISCONTINUED | OUTPATIENT
Start: 2021-12-28 | End: 2021-12-29 | Stop reason: HOSPADM

## 2021-12-28 RX ORDER — M-VIT,TX,IRON,MINS/CALC/FOLIC 27MG-0.4MG
1 TABLET ORAL DAILY
COMMUNITY

## 2021-12-28 RX ORDER — PHENOL 1.4 %
1 AEROSOL, SPRAY (ML) MUCOUS MEMBRANE DAILY
COMMUNITY

## 2021-12-28 RX ORDER — CLOPIDOGREL BISULFATE 75 MG/1
75 TABLET ORAL DAILY
Status: DISCONTINUED | OUTPATIENT
Start: 2021-12-28 | End: 2021-12-29 | Stop reason: HOSPADM

## 2021-12-28 RX ORDER — PREGABALIN 25 MG/1
50 CAPSULE ORAL DAILY
Status: DISCONTINUED | OUTPATIENT
Start: 2021-12-29 | End: 2021-12-29 | Stop reason: HOSPADM

## 2021-12-28 RX ORDER — PREGABALIN 150 MG/1
150 CAPSULE ORAL 2 TIMES DAILY
Status: ON HOLD | COMMUNITY
End: 2021-12-29 | Stop reason: SDUPTHER

## 2021-12-28 RX ORDER — ROSUVASTATIN CALCIUM 5 MG/1
5 TABLET, COATED ORAL DAILY
COMMUNITY

## 2021-12-28 RX ORDER — ASPIRIN 81 MG/1
81 TABLET, CHEWABLE ORAL DAILY
Status: DISCONTINUED | OUTPATIENT
Start: 2021-12-28 | End: 2021-12-29 | Stop reason: HOSPADM

## 2021-12-28 RX ORDER — ASPIRIN 81 MG/1
324 TABLET, CHEWABLE ORAL DAILY
Status: DISCONTINUED | OUTPATIENT
Start: 2021-12-28 | End: 2021-12-29 | Stop reason: HOSPADM

## 2021-12-28 RX ADMIN — METHYLPREDNISOLONE SODIUM SUCCINATE 40 MG: 40 INJECTION, POWDER, FOR SOLUTION INTRAMUSCULAR; INTRAVENOUS at 17:56

## 2021-12-28 RX ADMIN — LORAZEPAM 0.5 MG: 0.5 TABLET ORAL at 05:21

## 2021-12-28 RX ADMIN — ARIPIPRAZOLE 2 MG: 2 TABLET ORAL at 09:10

## 2021-12-28 RX ADMIN — ARIPIPRAZOLE 2 MG: 2 TABLET ORAL at 20:57

## 2021-12-28 RX ADMIN — IOPAMIDOL 100 ML: 755 INJECTION, SOLUTION INTRAVENOUS at 17:04

## 2021-12-28 RX ADMIN — ROSUVASTATIN CALCIUM 5 MG: 5 TABLET, COATED ORAL at 20:57

## 2021-12-28 RX ADMIN — IPRATROPIUM BROMIDE AND ALBUTEROL SULFATE 1 AMPULE: .5; 2.5 SOLUTION RESPIRATORY (INHALATION) at 18:03

## 2021-12-28 RX ADMIN — METHYLPREDNISOLONE SODIUM SUCCINATE 40 MG: 40 INJECTION, POWDER, FOR SOLUTION INTRAMUSCULAR; INTRAVENOUS at 05:21

## 2021-12-28 RX ADMIN — DOXYCYCLINE 100 MG: 100 INJECTION, POWDER, LYOPHILIZED, FOR SOLUTION INTRAVENOUS at 20:56

## 2021-12-28 RX ADMIN — ASPIRIN 81 MG 81 MG: 81 TABLET ORAL at 14:11

## 2021-12-28 RX ADMIN — IPRATROPIUM BROMIDE AND ALBUTEROL SULFATE 1 AMPULE: .5; 2.5 SOLUTION RESPIRATORY (INHALATION) at 05:19

## 2021-12-28 RX ADMIN — Medication 10 ML: at 09:11

## 2021-12-28 RX ADMIN — IPRATROPIUM BROMIDE AND ALBUTEROL SULFATE 1 AMPULE: .5; 2.5 SOLUTION RESPIRATORY (INHALATION) at 11:36

## 2021-12-28 RX ADMIN — METHYLPHENIDATE HYDROCHLORIDE 20 MG: 5 TABLET ORAL at 09:09

## 2021-12-28 RX ADMIN — LEVOTHYROXINE SODIUM 125 MCG: 0.12 TABLET ORAL at 05:21

## 2021-12-28 RX ADMIN — CLOPIDOGREL BISULFATE 75 MG: 75 TABLET ORAL at 14:10

## 2021-12-28 RX ADMIN — DOXYCYCLINE 100 MG: 100 INJECTION, POWDER, LYOPHILIZED, FOR SOLUTION INTRAVENOUS at 09:13

## 2021-12-28 RX ADMIN — Medication 3 ML: at 17:57

## 2021-12-28 RX ADMIN — Medication 3 ML: at 09:11

## 2021-12-28 RX ADMIN — PANTOPRAZOLE SODIUM 40 MG: 40 TABLET, DELAYED RELEASE ORAL at 05:21

## 2021-12-28 RX ADMIN — ACETAMINOPHEN 650 MG: 325 TABLET ORAL at 05:20

## 2021-12-28 RX ADMIN — Medication 10 ML: at 20:57

## 2021-12-28 RX ADMIN — LISINOPRIL 5 MG: 5 TABLET ORAL at 21:56

## 2021-12-28 RX ADMIN — VENLAFAXINE HYDROCHLORIDE 150 MG: 150 CAPSULE, EXTENDED RELEASE ORAL at 09:08

## 2021-12-28 RX ADMIN — LORAZEPAM 0.5 MG: 0.5 TABLET ORAL at 16:35

## 2021-12-28 RX ADMIN — ENOXAPARIN SODIUM 40 MG: 40 INJECTION SUBCUTANEOUS at 09:10

## 2021-12-28 ASSESSMENT — PAIN SCALES - GENERAL
PAINLEVEL_OUTOF10: 0
PAINLEVEL_OUTOF10: 3
PAINLEVEL_OUTOF10: 6

## 2021-12-28 ASSESSMENT — PAIN DESCRIPTION - ORIENTATION: ORIENTATION: LOWER

## 2021-12-28 ASSESSMENT — PAIN DESCRIPTION - LOCATION: LOCATION: BACK

## 2021-12-28 ASSESSMENT — PAIN DESCRIPTION - PAIN TYPE: TYPE: CHRONIC PAIN

## 2021-12-28 NOTE — PLAN OF CARE
Problem: Pain:  Description: Pain management should include both nonpharmacologic and pharmacologic interventions.   Goal: Pain level will decrease  Description: Pain level will decrease  Outcome: Met This Shift  Goal: Control of acute pain  Description: Control of acute pain  Outcome: Met This Shift  Goal: Control of chronic pain  Description: Control of chronic pain  Outcome: Met This Shift     Problem: Gas Exchange - Impaired:  Goal: Levels of oxygenation will improve  Description: Levels of oxygenation will improve  Outcome: Met This Shift

## 2021-12-28 NOTE — PROGRESS NOTES
Occupational Therapy   Occupational Therapy Initial Assessment- Med pt  Date: 2021   Patient Name: Israel Barry  MRN: [id number]     : 1955    Date of Service: 2021    Discharge Recommendations:  24 hour supervision or assist       Assessment   Performance deficits / Impairments: Decreased safe awareness;Decreased cognition  Assessment: Pt is a 67y/o female PLOF lives with sister, full hx unclear since pt with cog issues (stroke in Aug 2021), but informed was I/MI with ADL. Pt presents to [facility]* Moderate persistent asthma with exacerbation. Pt baseline for OT/with ADL but recommended SLP for cog eval.  Treatment Diagnosis: Moderate persistent asthma with exacerbation  Prognosis: Good  Decision Making: Medium Complexity  History: multi comorb  Exam: 2 perf def/impair  OT Education: OT Role  REQUIRES OT FOLLOW UP: No  Safety Devices  Safety Devices in place: Yes  Type of devices: All fall risk precautions in place           Patient Diagnosis(es): The encounter diagnosis was Moderate persistent asthma with exacerbation. has a past medical history of Asthma, Cerebral artery occlusion with cerebral infarction [facility]), Depression, Migraine, and Thyroid disease. has a past surgical history that includes Hysterectomy and knee surgery.     Treatment Diagnosis: Moderate persistent asthma with exacerbation      Restrictions  Restrictions/Precautions  Restrictions/Precautions: Fall Risk    Subjective   General  Chart Reviewed: Yes  Patient assessed for rehabilitation services?: Yes  Additional Pertinent Hx: Stroke in Aug per chart review- has had inc'd cog decline since  Family / Caregiver Present: No  Referring Practitioner: Dr. Dennie House  Diagnosis: Moderate persistent asthma with exacerbation  Subjective  Subjective: Pt agreeable to OT eval/tx  Patient Currently in Pain: Yes  Pain Assessment  Pain Assessment: 0-10  Pain Level: 6  Pain Type: Chronic pain  Pain Location: Back  Pain Orientation: Lower  Vital Signs  Patient Currently in Pain: Yes  Social/Functional History  Social/Functional History  Lives With: Family (sister)  Type of Home: Apartment  Home Layout: One level (1st floor unit)  Home Access: Stairs to enter with rails  Entrance Stairs - Number of Steps: 4  Entrance Stairs - Rails: Left (ascending)  Bathroom Shower/Tub: Walk-in shower,Shower chair with back  Bathroom Toilet: Standard  Bathroom Equipment: Grab bars in shower,Shower chair,Hand-held shower,Grab bars around toilet  Bathroom Accessibility: Not accessible  Home Equipment: 4 wheeled walker,Rolling walker  Receives Help From: Family (brother, sister)  ADL Assistance: Independent  Homemaking Responsibilities: Yes (pt did most homemaking tasks)  Ambulation Assistance: Independent (with Rollator)  Transfer Assistance: Independent  Active : Yes  Mode of Transportation: Car  Type of occupation: 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 Recommended SLP eval for cog- informed rehab director and nsg. Per nsg, they informed sister told them pt had a stroke in Aug 2021 and has had cognitive decline since.        Objective        Orientation  Overall Orientation Status: Impaired (difficult to fully assess- expressive aphasia)  Orientation Level: Disoriented to time;Disoriented to place     Functional Mobility  Functional - Mobility Device: No device  Activity: To/from bathroom  Assist Level: Modified independent   ADL  Feeding: Modified independent   Grooming: Modified independent   UE Bathing: Supervision  LE Bathing: Supervision  UE Dressing: Modified independent   LE Dressing: Supervision  Toileting: Supervision (espresses incontince in hx- sometimes)  Additional Comments: baseline with OT needs/ADL status  Tone RUE  RUE Tone: Normotonic  Tone LUE  LUE Tone: Normotonic  Coordination  Movements Are Fluid And Coordinated: Yes     Bed mobility  Supine to Sit: Independent  Sit to Supine: Independent  Transfers  Sit to stand: Modified independent  Stand to sit: Modified independent                       LUE AROM (degrees)  LUE AROM : WFL  Left Hand AROM (degrees)  Left Hand AROM: WFL  RUE AROM (degrees)  RUE AROM : WFL  Right Hand AROM (degrees)  Right Hand AROM: WFL  LUE Strength  Gross LUE Strength: WFL  L Shoulder Flex: 4/5  RUE Strength  Gross RUE Strength: WFL  R Shoulder Flex: 4/5     Hand Dominance  Hand Dominance: Right             Plan   Plan  Times per week: Initial OT eval/tx      Goals  Patient Goals   Patient goals : Unable to state goals       Therapy Time   Individual Concurrent Group Co-treatment   Time In  12:50         Time Out  1:50         Minutes  [address] [facility],

## 2021-12-28 NOTE — PROGRESS NOTES
Physical Therapy    Medical Initial Assessment    NAME: Cassidy Lopez  : 1955  MRN: [id number]    Date of Service: 2021    Patient Diagnosis(es): The encounter diagnosis was Moderate persistent asthma with exacerbation. has a past medical history of Asthma, Cerebral artery occlusion with cerebral infarction [facility]), Depression, Migraine, and Thyroid disease. has a past surgical history that includes Hysterectomy and knee surgery. Restrictions  Restrictions/Precautions  Restrictions/Precautions: Fall Risk  Vision/Hearing  Vision: Impaired  Vision Exceptions: Wears glasses for reading;Wears glasses for distance  Hearing: Within functional limits     Subjective  General  Chart Reviewed: Yes  Patient assessed for rehabilitation services?: Yes  Additional Pertinent Hx: old L knee, pt reports no memory issues before this admission- recommending cog eval as word finding prolems- charge RN made aware and will request of hospitalist MD  Family / Caregiver Present: No  Referral Date : 21  Follows Commands: Impaired (slow response time kira w verbal- expressive aphaasia? new?)  General Comment  Comments: Pt appears very anxious for eval , fidgety, pt having trouble finding words, better if given choices for responses  Subjective  Subjective: Pt reports difficulty breathing x 2 weeks- to ER.    Pain Screening  Patient Currently in Pain: No (reports choroinc back pain with prolonged stand/walk)  Vital Signs  Patient Currently in Pain: No (reports choroinc back pain with prolonged stand/walk)  Patient Observation  Observations: anxious and fidgety for eval, prolonged pause to answer questions, ? expressive aphasi- recommend ST eval, needs choices to anser PT eval qustions and demonstration for MMT  Pre Treatment Pain Screening  Pain at present: 0  Scale Used: Numeric Score  Intervention List: Patient able to continue with treatment    Orientation  Orientation  Overall Orientation Status: Impaired  Orientation Level: Oriented to situation;Oriented to person    Social/Functional History  Social/Functional History  Lives With: Family (sister , dogs and cats)  Type of Home: Apartment  Home Layout: One level  Home Access: Stairs to enter with rails  Entrance Stairs - Number of Steps: 4  Entrance Stairs - Rails: Left  Bathroom Shower/Tub: Walk-in shower,Shower chair with back  Bathroom Toilet: Standard  Bathroom Equipment: Grab bars in shower,Shower chair,Hand-held shower,Grab bars around toilet  Bathroom Accessibility: Not accessible  Home Equipment: 4 wheeled walker,Rolling walker  Receives Help From: Family  ADL Assistance: Independent  Homemaking Responsibilities: Yes  Ambulation Assistance: Independent (unsure if AD use)  Transfer Assistance: Independent  Active : Yes  Mode of Transportation: Car  Type of occupation: 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 Recommended SLP eval for cog- informed nsg. Per nsg, they informed sister told them pt had a stroke in Aug 2021 and has had cognitive decline since. Objective          AROM RLE (degrees)  RLE AROM: WFL  AROM LLE (degrees)  LLE AROM : WFL  Strength RLE  Comment: sitting hip, knee and ankle >= 4+/5  Strength LLE  Comment: sitting hip, knee and nakel >= 4+/5  Coordination  Movements Are Fluid And Coordinated: Yes  Sensation  Overall Sensation Status:  (some tingling in feet ?  chronic back pain issues)     Transfers  Sit to Stand: Supervision;Stand by assistance  Stand to sit: Supervision;Stand by assistance  Comment: no LOB  Ambulation  Ambulation?: Yes  More Ambulation?: Yes  Ambulation 1  Surface: level tile  Device: Rolling Walker  Assistance: Stand by assistance;Supervision  Quality of Gait: good pace, equal steps, no LOB  Distance: 150ft  Comments: reports fatigue post but no SOB noted  Ambulation 2  Surface - 2: level tile  Device 2: No device  Assistance 2: Stand by assistance  Quality of Gait 2: good steps, no LOB, good pace, good awareness of room 258 with min vc to find it  Distance: 150ft - reports mild SOB- more anxious than short of breath  Comments: gait belt used  Stairs/Curb  Stairs?: Yes  Stairs  # Steps : 10  Stairs Height:  (four 6 inch and six 4 inch steps)  Rails: Left ascending  Device: No Device  Assistance: Stand by assistance     Balance  Sitting - Static: Good  Sitting - Dynamic: Good  Standing - Static: Good  Standing - Dynamic: Good        Assessment   Body structures, Functions, Activity limitations: Decreased functional mobility ; Decreased safe awareness;Decreased endurance  Assessment: 66yof with asthma and COPD exacerbation, complicated by expressive aphasia since CVA in [place] 2021 per sister report Pt would benefit form skilled medical PT to gain endurance and functional mobility independence for discharge. Recommend pt have speech cog/aphasia eval and possibly OP ST at discharge if continued issues with expressive aphasia. Overall pt exhibits safety awareness, but very anxious and fidgeting throughout PT eval  Treatment Diagnosis: decreased fucntional moblity endurance with asthma/ COPD exacerbation  Patient Education: recommendation of OP ST for expressive apahsia, pt feels this might help.   REQUIRES PT FOLLOW UP: Yes  Activity Tolerance  Activity Tolerance: Patient Tolerated treatment well     Discharge Recommendations: home with PRN A, recommend OP ST for expressive aphasia, no post medical course PT needs noted         Plan   Plan  Times per week: 5-7x week  Times per day:  (1-2x per day)  Plan weeks: < 1 week medical patient  Current Treatment Recommendations: Strengthening,Endurance Training,Patient/Caregiver Education & Training,Equipment Evaluation, Education, & procurement,Gait Training,Stair training,Safety Education & Training,Home Exercise Program,Positioning         Goals  Short term goals  Time Frame for Short term goals: 2-3 days medical pt  Short term goal 1: transfers and bed mobitly odified independnet and safe  Short term goal 2: amb>= 250 ft before fstigued no AD, good pace and safety   Short term goal 3: Increase LE enurance any amount to achieve funcitoanl mobilty goals. Dock Needy term goal 4: 10 stairs L rail ascending <= supervision  Short term goal 5: Assist with discharge planning  Long term goals  Time Frame for Long term goals : n/a medical pt  Patient Goals   Patient goals : \" I need to breathe better so I could go home. \"       Therapy Time   Individual Concurrent Group Co-treatment   Time In  220         Time Out  300         Minutes  [address] [facility],[address] [facility]

## 2021-12-28 NOTE — ED NOTES
Informed nursing supervisor patient is ready to be taken to the floor.       Luly Escamilla  12/27/21 [id number]

## 2021-12-28 NOTE — PROGRESS NOTES
Pt agrees that she has a thought in her head, but her mouth will not say the same thing. Patient can  Say her own name, her sisters name, and identify drinks on table. Patient sent down at 5pm for CT scan. Will continue to monitor.

## 2021-12-28 NOTE — H&P
Hospital Medicine  History and Physical    Patient:  Amrit   MRN: [id number]    CHIEF COMPLAINT:    Chief Complaint   Patient presents with    Shortness of Breath     x 4 hours, inhailer without help       History Obtained From:  Patient, EMR  Primary Care Physician: Elizabeth Mai MD    HISTORY OF PRESENT ILLNESS:   The patient is a 74 y.o. female who came in with cough, shortness of breath and wheezing. Patient has cognitive loss and unable to process information rapidly and unable to provide consistent accurate information. I had to call her sister and verify some of the information provided. Patient is smokes half a pack daily for the last 20 years. Patient is a . Patient had a stroke in August according to her sister and since then she has had cognitive decline and difficulty processing information. No focal weakness or numbness. Patient denies any headaches    Past Medical History:      Diagnosis Date    Asthma     Cerebral artery occlusion with cerebral infarction ([facility] [address]     8/2021    Depression     Migraine     Thyroid disease     hypothyroid       Past Surgical History:      Procedure Laterality Date    HYSTERECTOMY      KNEE SURGERY      left knee total replacement       Medications Prior to Admission:    Prior to Admission medications    Medication Sig Start Date End Date Taking?  Authorizing Provider   ARIPiprazole (ABILIFY) 2 MG tablet Take 2 mg by mouth 2 times daily   Yes Historical Provider, MD   levothyroxine (SYNTHROID) 125 MCG tablet Take 125 mcg by mouth Daily   Yes Historical Provider, MD   etodolac (LODINE) 400 MG tablet Take 400 mg by mouth 2 times daily   Yes Historical Provider, MD   omeprazole (PRILOSEC) 20 MG delayed release capsule Take 20 mg by mouth daily   Yes Historical Provider, MD   LORazepam (ATIVAN) 1 MG tablet Take 1 mg by mouth every 8 hours as needed for Anxiety   Yes Historical Provider, MD   venlafaxine (EFFEXOR XR) 150 MG extended release capsule Take 150 mg by mouth daily   Yes Historical Provider, MD   fluticasone-salmeterol (ADVAIR) 250-50 MCG/DOSE AEPB Inhale 1 puff into the lungs every 12 hours   Yes Historical Provider, MD   ibuprofen (ADVIL;MOTRIN) 600 MG tablet Take 1 tablet by mouth every 8 hours as needed for Pain 1/22/17   Hortencia Thibodeaux MD       Allergies:  Ciprofloxacin, Sulfa antibiotics, and Zithromax [azithromycin]    Social History:   TOBACCO:   reports that she has been smoking cigarettes. She has never used smokeless tobacco.  ETOH:   reports no history of alcohol use. Family History:   History reviewed. No pertinent family history. REVIEW OF SYSTEMS:  Ten systems reviewed and negative except for stated in HPI    Physical Exam:    Vitals: /77   Pulse 107   Temp 97.7 °F (36.5 °C)   Resp 14   Ht 5' 5\" (1.651 m)   Wt 185 lb 4.8 oz (84.1 kg)   SpO2 96%   BMI 30.84 kg/m²   General appearance: alert, appears stated age and cooperative, moderate acute distress  Skin: Skin color, texture, turgor normal. No rashes or lesions  HEENT: Head: Normocephalic, no lesions, without obvious abnormality. Neck: no adenopathy, no carotid bruit, no JVD, supple, symmetrical, trachea midline and thyroid not enlarged, symmetric, no tenderness/mass/nodules  Lungs: Bilateral fine wheezing and rhonchi. Heart: regular rate and rhythm, S1, S2 normal, no murmur, click, rub or gallop  Abdomen: soft, non-tender; bowel sounds normal; no masses,  no organomegaly  Extremities: extremities normal, atraumatic, no cyanosis or edema  Neurologic: Mental status: Patient is awake. She is oriented to place and person but not to the exact date and location. Slight cognitive loss. Slow to process information. Slow to answer question or provide consistent accurate information. Symmetrical motor and tone.     Recent Labs     12/27/21  1708 12/28/21  0609   WBC 11.2* 13.4*   HGB 12.1 12.4   * 433*     Recent Labs     12/27/21  1708 12/28/21  0609    140   K 3.9 4.3    103   CO2 22 23   BUN 18 17   CREATININE 0.45* 0.49*   GLUCOSE 120* 160*   AST 19  --    ALT 18  --    BILITOT 0.3  --    ALKPHOS 97  --      Troponin T:   Recent Labs     12/27/21  1708   TROPONINI <0.010       ABGs: No results found for: PHART, PO2ART, NSR5LFI  INR:   Recent Labs     12/27/21  1752   INR 1.0     URINALYSIS:  Recent Labs     12/27/21  2121   COLORU Yellow   PHUR 5.5   WBCUA 3-5   RBCUA 3-5*   BACTERIA MODERATE*   CLARITYU SLCLOUDY   SPECGRAV 1.025   LEUKOCYTESUR Negative   UROBILINOGEN 0.2   BILIRUBINUR Negative   BLOODU Small   GLUCOSEU Negative     -----------------------------------------------------------------   XR CHEST PORTABLE    Result Date: 12/27/2021  Exam: XR CHEST PORTABLE History:  sob Technique: AP portable view of the chest obtained. Comparison: none Chest x-ray portable Findings: The cardiomediastinal silhouette is within normal limits. There are no infiltrates, consolidations or effusions. Bones of the thorax appear intact. No radiographic evidence of acute intrathoracic process. Assessment and Plan     *Acute COPD exacerbation, this is associated with frequent cough and anterior chest wall pain and discomfort rule out other possible etiologies. *Acute bronchitis, rule out developing pneumonia. *Active tobacco addiction with a long history of asthma. I suspect that the patient has a combination asthma with emphysema    *History of stroke which had left patient with cognitive decline. Patient is a slow to process information and give accurate and consistent information. I called her Sister Eleni Jefferson who stated that patient has had this since her stroke in August.    *Hypothyroidism. *Hypertension. *Few other medical conditions not listed above. Plan:  I had accepted to admit the patient to the medical floor. I started the patient on Rocephin, doxycycline, Solu-Medrol, albuterol and Atrovent.     Will request EKG and telemetry monitoring. I requested D-dimer. If D-dimer is positive I will proceed with CTA of the chest to rule out PE. If D-dimer is negative I will request CT with regular contrast.    Resume her preadmission home medications after verification. Patient is on aspirin and Plavix according to the sister that are not listed on her home medication. Monitor her neurological status over the next 24 to 48 hours. To proceed with additional neurological work-up if clinically indicated. Patient Active Problem List   Diagnosis Code    COPD exacerbation ([facility] [address] Peggy Moses MD, MD  Admitting Hospitalist    TTS: 85mins where I focused more than 75% of my attention on rendering care, and planning treatment course for this patient, in addition to talking to RN team, mid levels, consulting with other physicians and following up on labs and imaging. High Risk Readmission Screening Tool Score Noted.      Emergency Contact:

## 2021-12-29 VITALS
BODY MASS INDEX: 30.87 KG/M2 | DIASTOLIC BLOOD PRESSURE: 85 MMHG | WEIGHT: 185.3 LBS | OXYGEN SATURATION: 100 % | HEIGHT: 65 IN | SYSTOLIC BLOOD PRESSURE: 144 MMHG | RESPIRATION RATE: 18 BRPM | HEART RATE: 87 BPM | TEMPERATURE: 97.8 F

## 2021-12-29 PROCEDURE — 97116 GAIT TRAINING THERAPY: CPT

## 2021-12-29 PROCEDURE — 6360000002 HC RX W HCPCS: Performed by: INTERNAL MEDICINE

## 2021-12-29 PROCEDURE — 6370000000 HC RX 637 (ALT 250 FOR IP): Performed by: INTERNAL MEDICINE

## 2021-12-29 PROCEDURE — 2580000003 HC RX 258: Performed by: INTERNAL MEDICINE

## 2021-12-29 PROCEDURE — 97530 THERAPEUTIC ACTIVITIES: CPT

## 2021-12-29 PROCEDURE — 2500000003 HC RX 250 WO HCPCS: Performed by: INTERNAL MEDICINE

## 2021-12-29 PROCEDURE — 94640 AIRWAY INHALATION TREATMENT: CPT

## 2021-12-29 RX ORDER — PREDNISONE 10 MG/1
10 TABLET ORAL SEE ADMIN INSTRUCTIONS
Qty: 18 TABLET | Refills: 0 | Status: SHIPPED | OUTPATIENT
Start: 2021-12-29 | End: 2022-01-03

## 2021-12-29 RX ORDER — LORAZEPAM 1 MG/1
0.5 TABLET ORAL EVERY 8 HOURS PRN
COMMUNITY
Start: 2021-12-29

## 2021-12-29 RX ORDER — ALBUTEROL SULFATE 90 UG/1
2 AEROSOL, METERED RESPIRATORY (INHALATION) 4 TIMES DAILY PRN
Qty: 54 G | Refills: 1 | Status: SHIPPED | OUTPATIENT
Start: 2021-12-29

## 2021-12-29 RX ORDER — GUAIFENESIN 600 MG/1
600 TABLET, EXTENDED RELEASE ORAL 3 TIMES DAILY PRN
Qty: 21 TABLET | Refills: 0 | Status: SHIPPED | OUTPATIENT
Start: 2021-12-29

## 2021-12-29 RX ORDER — DOXYCYCLINE HYCLATE 100 MG
100 TABLET ORAL 2 TIMES DAILY
Qty: 10 TABLET | Refills: 0 | Status: SHIPPED | OUTPATIENT
Start: 2021-12-29 | End: 2022-01-03

## 2021-12-29 RX ORDER — NICOTINE 21 MG/24HR
1 PATCH, TRANSDERMAL 24 HOURS TRANSDERMAL DAILY
Qty: 14 PATCH | Refills: 0 | Status: SHIPPED | OUTPATIENT
Start: 2021-12-30

## 2021-12-29 RX ORDER — PREGABALIN 150 MG/1
150 CAPSULE ORAL DAILY
Qty: 60 CAPSULE | Refills: 3 | COMMUNITY
Start: 2021-12-29

## 2021-12-29 RX ADMIN — ACETAMINOPHEN 650 MG: 325 TABLET ORAL at 07:25

## 2021-12-29 RX ADMIN — Medication 10 ML: at 07:27

## 2021-12-29 RX ADMIN — CLOPIDOGREL BISULFATE 75 MG: 75 TABLET ORAL at 07:27

## 2021-12-29 RX ADMIN — METHYLPREDNISOLONE SODIUM SUCCINATE 40 MG: 40 INJECTION, POWDER, FOR SOLUTION INTRAMUSCULAR; INTRAVENOUS at 05:39

## 2021-12-29 RX ADMIN — PANTOPRAZOLE SODIUM 40 MG: 40 TABLET, DELAYED RELEASE ORAL at 05:39

## 2021-12-29 RX ADMIN — DOXYCYCLINE 100 MG: 100 INJECTION, POWDER, LYOPHILIZED, FOR SOLUTION INTRAVENOUS at 07:28

## 2021-12-29 RX ADMIN — LISINOPRIL 5 MG: 5 TABLET ORAL at 07:29

## 2021-12-29 RX ADMIN — LORAZEPAM 0.5 MG: 0.5 TABLET ORAL at 05:44

## 2021-12-29 RX ADMIN — VENLAFAXINE HYDROCHLORIDE 150 MG: 150 CAPSULE, EXTENDED RELEASE ORAL at 07:27

## 2021-12-29 RX ADMIN — ARIPIPRAZOLE 2 MG: 2 TABLET ORAL at 07:26

## 2021-12-29 RX ADMIN — ENOXAPARIN SODIUM 40 MG: 40 INJECTION SUBCUTANEOUS at 07:27

## 2021-12-29 RX ADMIN — PREGABALIN 50 MG: 25 CAPSULE ORAL at 07:26

## 2021-12-29 RX ADMIN — ASPIRIN 81 MG 81 MG: 81 TABLET ORAL at 07:26

## 2021-12-29 RX ADMIN — IPRATROPIUM BROMIDE AND ALBUTEROL SULFATE 1 AMPULE: .5; 2.5 SOLUTION RESPIRATORY (INHALATION) at 12:44

## 2021-12-29 RX ADMIN — LEVOTHYROXINE SODIUM 125 MCG: 0.12 TABLET ORAL at 05:39

## 2021-12-29 RX ADMIN — IPRATROPIUM BROMIDE AND ALBUTEROL SULFATE 1 AMPULE: .5; 2.5 SOLUTION RESPIRATORY (INHALATION) at 05:49

## 2021-12-29 RX ADMIN — Medication 10 ML: at 05:41

## 2021-12-29 ASSESSMENT — PAIN SCALES - GENERAL
PAINLEVEL_OUTOF10: 6
PAINLEVEL_OUTOF10: 0

## 2021-12-29 NOTE — PROGRESS NOTES
Chart reviewed. Patient admitted to [facility] with a diagnosis of COPD exacerbation. Collaborated with staff. Patient reported to be independent for ADLS and reported not to have any SS or DC needs at this time. SS to follow as needed while patient is at [facility].

## 2021-12-29 NOTE — PROGRESS NOTES
Pt occasionally has difficulty finding words. Per pt this has been going on since her stroke earlier this year. She does not feel there are any recent changes. Pt is alert and oriented x4 at this time.  Will continue to monitor

## 2021-12-29 NOTE — DISCHARGE SUMMARY
Hospital Medicine Discharge Summary    Maximus Avila  :  1955  MRN:  [id number]    Admit date:  2021  Discharge date:  2021    Admitting Physician:  Arias Hyde MD  Primary Care Physician:  Bobby Scherer MD      Discharge Diagnoses:      *Acute COPD exacerbation,  troponin is negative. CAT scan of the chest is negative for any acute thoracic abnormalities. No pulmonary bruising. *Acute bronchitis. No pneumonia on CT.     *Active tobacco addiction with a long history of asthma. Counseling about tobacco addiction. Patient promised to quit. We will provide her with nicotine patches.     *History of stroke which had left patient with cognitive decline. Patient is a slow to process information and give accurate and consistent information. Sister Feng Lenzt stated that this is not unusual for her. She has had some cognitive loss since her stroke in August.     *Hypothyroidism.     *Hypertension. Consider switching beta-blocker to alternative BP meds to help prevent further episode of bronchospasm if the patient develops a recurrent COPD exacerbation. This will be addressed by PCP. *Positive D-dimer. Negative CT of the chest for PE. Negative venous study for DVT. *Small microscopic hematuria. Instructed patient to ask her primary care doctor to repeat urine test in 4 weeks. Her Sister Feng Lentz is aware. If her hematuria persists the patient will need and require additional urological work-up and/or investigation which may include but not limited to cystoscopy, imaging of the kidneys and urinary tract.     *Few other medical conditions not listed above. Patient has multiple medical issues. Patient feels great. I witnessed the patient walking in the hallway several times without any difficulties or assist.  Patient wants to go home. Patient is not willing to stay any longer for any additional monitoring, additional work-up, investigation or treatment.   She is adamant about going home as soon as possible. She does want to eat her lunch either. I do not have clear or strong clinical justification to extend inpatient hospitalization against her will and desire to go home. Patient lives with her Thurnell Allyn. Adis Hooper came to pick her up. Patient  would definitely need and require close and frequent monitoring as well as additional work-up, investigation and therapeutic intervention that potentially could take place in the outpatient setting by primary care doctor in collaboration with other specialists. That is to prevent relapse, decompensation, rehospitalization and other medical implications. Hospital Course:   Neil Larkin is a 74 y.o. female that was admitted and treated at [facility] for the aforementioned medical issues. Patient had improved significantly. Patient wants to go home and adamant about leaving as soon as possible without given any consideration for additional observation, testing, investigation or treatment. General appearance: alert, appears stated age and cooperative, moderate acute distress  Skin: Skin color, texture, turgor normal. No rashes or lesions  HEENT: Head: Normocephalic, no lesions, without obvious abnormality. Neck: no adenopathy, no carotid bruit, no JVD, supple, symmetrical, trachea midline and thyroid not enlarged, symmetric, no tenderness/mass/nodules  Lungs:  Resolution bilateral fine wheezing and rhonchi. Heart: regular rate and rhythm, S1, S2 normal, no murmur, click, rub or gallop  Abdomen: soft, non-tender; bowel sounds normal; no masses,  no organomegaly  Extremities: extremities normal, atraumatic, no cyanosis or edema  Neurologic: Mental status: Patient is awake. She is oriented to place and person as well as date and location. .  Slight cognitive loss. Slow to process information. Slow to answer question or provide consistent accurate information. Symmetrical motor and tone.   I called her sister Adis Hooper yesterday or effusions. Bones of the thorax appear intact. No radiographic evidence of acute intrathoracic process. US DUP LOWER EXTREMITIES BILATERAL VENOUS    Result Date: 12/28/2021  EXAMINATION: US DUP LOWER EXTREMITIES BILATERAL VENOUS CLINICAL HISTORY: SHORTNESS OF BREATH. ELEVATED D-DIMER. FINDINGS: Bilateral compression, augmentation, and color flow visualized at level of common femoral veins, proximal, mid, and distal superficial femoral veins, and popliteal veins. Compression and color flow demonstrated bilateral infrapopliteal venous vasculature. NO SONOGRAPHIC EVIDENCE, DEEP VEIN THROMBOSIS, BILATERAL LOWER EXTREMITIES. Discharge Medications:         Medication List      START taking these medications    albuterol sulfate  (90 Base) MCG/ACT inhaler  Commonly known as: Ventolin HFA  Inhale 2 puffs into the lungs 4 times daily as needed for Wheezing     doxycycline hyclate 100 MG tablet  Commonly known as: VIBRA-TABS  Take 1 tablet by mouth 2 times daily for 5 days     guaiFENesin 600 MG extended release tablet  Commonly known as: MUCINEX  Take 1 tablet by mouth 3 times daily as needed for Congestion     * nicotine 7 MG/24HR  Commonly known as: Nicoderm CQ  Place 1 patch onto the skin every 24 hours Start taking this 7 mg low dose patch after you complete 14 days of medium dose patch 14mg daily     * nicotine 14 MG/24HR  Commonly known as: [id number] [facility] 1 patch onto the skin daily  Start taking on: December 30, 2021     predniSONE 10 MG tablet  Commonly known as: DELTASONE  Take 1 tablet by mouth See Admin Instructions for 5 days Take 1 tablet twice a day for 5 days then 1 tablet daily for 5 days then half a tablet daily         * This list has 2 medication(s) that are the same as other medications prescribed for you. Read the directions carefully, and ask your doctor or other care provider to review them with you.             CHANGE how you take these medications    LORazepam 1 MG tablet  Commonly known as: ATIVAN  What changed: how much to take     Lyrica 150 MG capsule  Generic drug: pregabalin  What changed: when to take this        CONTINUE taking these medications    ARIPiprazole 2 MG tablet  Commonly known as: ABILIFY     aspirin 81 MG chewable tablet     calcium carbonate 600 MG Tabs tablet     clopidogrel 75 MG tablet  Commonly known as: PLAVIX     Co Q 10 100 MG Caps     Effexor  MG extended release capsule  Generic drug: venlafaxine     ferrous sulfate 325 (65 Fe) MG tablet  Commonly known as: IRON 325     fluticasone-salmeterol 250-50 MCG/DOSE Aepb  Commonly known as: ADVAIR     GLUCOSAMINE CHOND CMP ADVANCED PO     levothyroxine 125 MCG tablet  Commonly known as: SYNTHROID     lisinopril 5 MG tablet  Commonly known as: PRINIVIL;ZESTRIL     metoprolol succinate 50 MG extended release tablet  Commonly known as: TOPROL XL     omeprazole 20 MG delayed release capsule  Commonly known as: PRILOSEC     rosuvastatin 5 MG tablet  Commonly known as: CRESTOR     therapeutic multivitamin-minerals tablet     vitamin B-12 1000 MCG tablet  Commonly known as: CYANOCOBALAMIN        STOP taking these medications    ibuprofen 600 MG tablet  Commonly known as: ADVIL;MOTRIN     Lodine 400 MG tablet  Generic drug: etodolac           Where to Get Your Medications      These medications were sent to [address] [place] [address], [address] [place] [address]    Phone: [phone]   · albuterol sulfate  (90 Base) MCG/ACT inhaler  · doxycycline hyclate 100 MG tablet  · guaiFENesin 600 MG extended release tablet  · nicotine 14 MG/24HR  · nicotine 7 MG/24HR  · predniSONE 10 MG tablet         Disposition:   Discharged to Home. Any C needs that were indicated and/or required as been addressed and set up by Social Work. Condition at discharge: Pt was medically stable at the time of discharge.  Significant improvement in clinical condition compared to initial condition at presentation to hospital    Activity: activity as tolerated, fall precautions. Total time taken for discharging this patient: 40 minutes. Greater than 70% of time was spent focused exclusively on this patient. Time was taken to review chart, discuss plans with consultants, reconciling medications, discussing plan answering questions with patient. Broderick Julian MD  12/29/2021, 11:58 AM  ----------------------------------------------------------------------------------------------------------------------    Stephanie Saleh,     Please return to ER or call 911 if you develop any significant signs or symptoms. I may not have addressed all of your medical illnesses or the abnormal blood work or imaging therefore please ask your PCP Kathleen Zuñiga MD and other out patient specialists and providers  to obtain [facility] record entirely to follow up on all of the abnormal labs, imaging and findings that I have and have not addressed during your hospitalization. Discharging you from the hospital does not mean that your medical care ends here and now. You may still need additional work up, investigation, monitoring, and treatment to be handled from this point on by outside providers including your PCP, Kathleen Zuñiga MD , Specialists and other healthcare providers. Please review your list of discharge medications prior to resuming medications you might still have at home, as the medications you need to be taking, dosages or how often you must take them may have changed. For medication questions, contact your retail pharmacy and your PCP, Kathleen Zuñiga MD .     ** I STRONGLY RECOMMEND that you follow up with Kathleen Zuñiga MD within 3 to 5 days for a post hospitalization evaluation. This specific office visit is covered by your insurance, and is not the same as your annual doctor visit/ check up.  This office visit is important, as it may prevent need for repeat and/or future hospitalizations. **    Your medical team at [facility]) appreciates the opportunity to work with you to get well! Sincerely,  Ravi Mancera MD Follow up with Dr. Dashawn Tejada MD in the next 7 days or sooner if needed. Please return to ER or call 911 if you develop any significant signs or symptoms. I may or may not have addressed all of your symptoms, medical issues,  Illnesses, or all of the abnormal blood work or imaging therefore please ask your PCP and other specialists to obtain [facility] record entirely to follow up on all of your symptoms, illnesses, abnormal labs, imaging and findings that I have and have not addressed during your hospitalization. Discharging you from the hospital does not mean that your medical care ends here and now. You may still need to have additional work up, investigation, monitoring, surveillance, and treatment to be handled from this point on by in the out patient setting by  out patient providers including your PCP, Specialists and other healthcare providers. For medication questions, contact your retail pharmacy and your PCP. Your medical team at [facility] appreciates the opportunity to work with you to get well!     Ravi Mancera MD

## 2021-12-29 NOTE — PROGRESS NOTES
Physical Therapy  Facility/Department: [facility] MED SURG UNIT  Daily Treatment Note  NAME: Ayleen Thompson  : 1955  MRN: [id number]    Date of Service: 2021    Discharge Recommendations:  Home with assist PRN (OP ST for expressive aphasia recommended)        Assessment   Body structures, Functions, Activity limitations: (P) Decreased functional mobility ; Decreased safe awareness;Decreased endurance  Assessment: (P) Pt. demo's good smooth controlled bed mobility, tsf's, and gait pattern with therapeutic intervention. Pt. also completed stair training progressing from 5 4-6 inch steps to 10 7 inch steps with and without assist rail. Pt. stedy. Pt. demo's mild to moderate SOB post 10 steps and gait. SpO2 desats to 94% with quick recovery 97% within 15 seconds. HR increased to 129 bpm with 25 second recovery to 100 to 107 bpm in seated position. Treatment Diagnosis: (P) decreased fucntional moblity endurance with asthma/ COPD exacerbation  Prognosis: (P) Good  REQUIRES PT FOLLOW UP: (P) Yes  Activity Tolerance  Activity Tolerance: (P) Patient Tolerated treatment well     Patient Diagnosis(es): The primary encounter diagnosis was Moderate persistent asthma with exacerbation. Diagnoses of Anxiety and Neuropathy were also pertinent to this visit. has a past medical history of Asthma, Cerebral artery occlusion with cerebral infarction [facility]), Depression, Migraine, and Thyroid disease. has a past surgical history that includes Hysterectomy and knee surgery. Restrictions  Restrictions/Precautions  Restrictions/Precautions: Fall Risk  Subjective   General  Chart Reviewed: (P) Yes  Additional Pertinent Hx: (P) old L knee, pt reports no memory issues before this admission- recommending cog eval as word finding prolems- charge RN made aware and will request of hospitalist MD  Family / Caregiver Present: (P) No  Subjective  Subjective: (P) Pt. reports hospital visit was for shortness of breath. Pt. agreeable to therapy. General Comment  Comments: (P) Room air. SpO2 97%          Orientation     Cognition      Objective    Bed Mobility:  Supine <-> sit: modified Independent  Scooting: independent  Transfers  Sit to Stand: (P) Supervision  Stand to sit: (P) Supervision  Ambulation  Ambulation?: (P) Yes  Ambulation 1  Surface: (P) level tile  Device: (P) No Device  Assistance: (P) Stand by assistance;Supervision  Quality of Gait: (P) Good safe kenny, equal step through gait pattern with B arm swing. Pt. steady. Distance: (P) 300'x1, 200'x1  Comments: (P) Mild weezing. SpO2 96% room air post increase distance. SpO2 desats to 94%  bpm post short gait distance and stair training of 10 steps. Quick recovery to 97%  to 107 within 15 to 25 seconds. Stairs/Curb  Stairs?: (P) Yes  Stairs  # Steps : (P) 10  Stairs Height: (P)  (7 inch)  Rails: (P) Left ascending;None  Device: (P) No Device  Assistance: (P) Stand by assistance  Comment: (P) Pt. stair trial x 1 with 4 and 6 inch stairs of 5 steps. Pt. tolerated well without use of rail. Pt. stedy. Second stair training trial performed with 7 inch stairs of 10 steps with L ascending rail. Pt. demo's and tolerates good with recipical ROM. Mild to moderate SOB demo'd post 10 steps. SpO2 desat to 94% with mild complaint of SOB. Quick recovery to 97% ~ 15 seconds. HR increased to 129 bpm with recover of 100 to 107 bpm withing 25 seconds.       Balance  Sitting - Static: (P) Good  Sitting - Dynamic: (P) Good  Standing - Static: (P) Good  Standing - Dynamic: (P) Good                           G-Code     OutComes Score                                                     AM-PAC Score             Goals  Short term goals  Time Frame for Short term goals: (P) 2-3 days medical pt  Short term goal 1: (P) transfers and bed mobility Modified independnet and safe  Short term goal 2: (P) amb>= 250 ft before fstigued no AD, good pace and safety   Short term goal 3: (P) Increase LE endurance any amount to achieve funcitoanl mobilty goals. Zee Ca term goal 4: (P) 10 stairs L rail ascending <= supervision  Short term goal 5: (P) Assist with discharge planning  Long term goals  Time Frame for Long term goals : (P) n/a medical pt  Patient Goals   Patient goals : (P) \" I need to breathe better so I could go home. \"    Plan    Plan  Times per week: (P) 5-7x week  Times per day: (P) Daily  Plan weeks: (P) < 1 week medical patient  Current Treatment Recommendations: (P) Strengthening,Endurance Training,Patient/Caregiver Education & Training,Equipment Evaluation, Education, & procurement,Gait Training,Stair training,Safety Education & Training,Home Exercise Program,Positioning     Therapy Time   Individual Concurrent Group Co-treatment   Time In  1110         Time Out  1200         Minutes  [facility]  License and [address] Number: (P) .[id number]

## 2022-01-02 LAB
BLOOD CULTURE, ROUTINE: NORMAL
CULTURE, BLOOD 2: NORMAL

## 2023-03-01 ENCOUNTER — DOCUMENTATION (OUTPATIENT)
Dept: PRIMARY CARE | Facility: CLINIC | Age: 68
End: 2023-03-01
Payer: MEDICARE

## 2023-03-06 DIAGNOSIS — G89.29 CHRONIC LOW BACK PAIN WITHOUT SCIATICA, UNSPECIFIED BACK PAIN LATERALITY: Primary | ICD-10-CM

## 2023-03-06 DIAGNOSIS — M54.50 CHRONIC LOW BACK PAIN WITHOUT SCIATICA, UNSPECIFIED BACK PAIN LATERALITY: Primary | ICD-10-CM

## 2023-03-06 RX ORDER — PREGABALIN 150 MG/1
150 CAPSULE ORAL 2 TIMES DAILY
Qty: 180 CAPSULE | Refills: 0 | Status: SHIPPED | OUTPATIENT
Start: 2023-03-06 | End: 2023-06-23 | Stop reason: SDUPTHER

## 2023-03-06 RX ORDER — PREGABALIN 150 MG/1
1 CAPSULE ORAL 2 TIMES DAILY
COMMUNITY
Start: 2021-03-15 | End: 2023-03-06 | Stop reason: SDUPTHER

## 2023-03-28 DIAGNOSIS — E11.42 DIABETIC PERIPHERAL NEUROPATHY (MULTI): ICD-10-CM

## 2023-03-29 RX ORDER — CELECOXIB 200 MG/1
CAPSULE ORAL
Qty: 30 CAPSULE | Refills: 3 | Status: SHIPPED | OUTPATIENT
Start: 2023-03-29 | End: 2023-07-11

## 2023-04-03 ENCOUNTER — HOSPITAL ENCOUNTER (EMERGENCY)
Age: 68
Discharge: HOME | End: 2023-04-04
Attending: EMERGENCY MEDICINE
Payer: MEDICARE

## 2023-04-03 ENCOUNTER — APPOINTMENT (OUTPATIENT)
Dept: CT IMAGING | Age: 68
End: 2023-04-03
Payer: MEDICARE

## 2023-04-03 DIAGNOSIS — M54.50 ACUTE RIGHT-SIDED LOW BACK PAIN WITHOUT SCIATICA: Primary | ICD-10-CM

## 2023-04-03 DIAGNOSIS — R09.02 HYPOXIA: ICD-10-CM

## 2023-04-03 DIAGNOSIS — J44.9 CHRONIC OBSTRUCTIVE PULMONARY DISEASE, UNSPECIFIED COPD TYPE (HCC): ICD-10-CM

## 2023-04-03 PROBLEM — M54.31 SCIATICA OF RIGHT SIDE: Status: ACTIVE | Noted: 2023-04-03

## 2023-04-03 LAB
ALBUMIN SERPL-MCNC: 4.1 G/DL (ref 3.5–4.6)
ALP SERPL-CCNC: 109 U/L (ref 40–130)
ALT SERPL-CCNC: 18 U/L (ref 0–33)
ANION GAP SERPL CALCULATED.3IONS-SCNC: 15 MEQ/L (ref 9–15)
AST SERPL-CCNC: 19 U/L (ref 0–35)
BACTERIA URNS QL MICRO: ABNORMAL /HPF
BASOPHILS # BLD: 0.1 K/UL (ref 0–0.1)
BASOPHILS NFR BLD: 0.3 % (ref 0.1–1.2)
BILIRUB SERPL-MCNC: <0.2 MG/DL (ref 0.2–0.7)
BILIRUB UR QL STRIP: NEGATIVE
BUN SERPL-MCNC: 25 MG/DL (ref 8–23)
CALCIUM SERPL-MCNC: 9.6 MG/DL (ref 8.5–9.9)
CHLORIDE SERPL-SCNC: 99 MEQ/L (ref 95–107)
CLARITY UR: CLEAR
CO2 SERPL-SCNC: 25 MEQ/L (ref 20–31)
COLOR UR: YELLOW
CREAT SERPL-MCNC: 0.78 MG/DL (ref 0.5–0.9)
EOSINOPHIL # BLD: 0.5 K/UL (ref 0–0.4)
EOSINOPHIL NFR BLD: 3 % (ref 0.7–5.8)
EPI CELLS #/AREA URNS HPF: ABNORMAL /HPF
ERYTHROCYTE [DISTWIDTH] IN BLOOD BY AUTOMATED COUNT: 15.9 % (ref 11.7–14.4)
GLOBULIN SER CALC-MCNC: 3.9 G/DL (ref 2.3–3.5)
GLUCOSE SERPL-MCNC: 150 MG/DL (ref 70–99)
GLUCOSE UR STRIP-MCNC: NEGATIVE MG/DL
HCT VFR BLD AUTO: 36.6 % (ref 37–47)
HGB BLD-MCNC: 11.5 G/DL (ref 11.2–15.7)
HGB UR QL STRIP: ABNORMAL
IMM GRANULOCYTES # BLD: 0.1 K/UL
IMM GRANULOCYTES NFR BLD: 0.7 %
KETONES UR STRIP-MCNC: NEGATIVE MG/DL
LEUKOCYTE ESTERASE UR QL STRIP: NEGATIVE
LYMPHOCYTES # BLD: 2.8 K/UL (ref 1.2–3.7)
LYMPHOCYTES NFR BLD: 17.4 %
MCH RBC QN AUTO: 27.8 PG (ref 25.6–32.2)
MCHC RBC AUTO-ENTMCNC: 31.4 % (ref 32.2–35.5)
MCV RBC AUTO: 88.6 FL (ref 79.4–94.8)
MONOCYTES # BLD: 1.1 K/UL (ref 0.2–0.9)
MONOCYTES NFR BLD: 6.8 % (ref 4.7–12.5)
NEUTROPHILS # BLD: 11.7 K/UL (ref 1.6–6.1)
NEUTS SEG NFR BLD: 71.8 % (ref 34–71.1)
NITRITE UR QL STRIP: NEGATIVE
PH UR STRIP: 5.5 [PH] (ref 5–9)
PLATELET # BLD AUTO: 397 K/UL (ref 182–369)
POTASSIUM SERPL-SCNC: 4.1 MEQ/L (ref 3.4–4.9)
PROT SERPL-MCNC: 8 G/DL (ref 6.3–8)
PROT UR STRIP-MCNC: 30 MG/DL
RBC # BLD AUTO: 4.13 M/UL (ref 3.93–5.22)
RBC #/AREA URNS HPF: ABNORMAL /HPF (ref 0–2)
SODIUM SERPL-SCNC: 139 MEQ/L (ref 135–144)
SP GR UR STRIP: >=1.03 (ref 1–1.03)
URINE REFLEX TO CULTURE: ABNORMAL
UROBILINOGEN UR STRIP-ACNC: 0.2 E.U./DL
WBC # BLD AUTO: 16.3 K/UL (ref 4–10)
WBC #/AREA URNS HPF: ABNORMAL /HPF (ref 0–5)
YEAST URNS QL MICRO: PRESENT /HPF

## 2023-04-03 PROCEDURE — 85025 COMPLETE CBC W/AUTO DIFF WBC: CPT

## 2023-04-03 PROCEDURE — 6370000000 HC RX 637 (ALT 250 FOR IP): Performed by: EMERGENCY MEDICINE

## 2023-04-03 PROCEDURE — 80053 COMPREHEN METABOLIC PANEL: CPT

## 2023-04-03 PROCEDURE — 2580000003 HC RX 258: Performed by: EMERGENCY MEDICINE

## 2023-04-03 PROCEDURE — 74176 CT ABD & PELVIS W/O CONTRAST: CPT

## 2023-04-03 PROCEDURE — 72131 CT LUMBAR SPINE W/O DYE: CPT

## 2023-04-03 PROCEDURE — 6360000002 HC RX W HCPCS: Performed by: EMERGENCY MEDICINE

## 2023-04-03 PROCEDURE — 94640 AIRWAY INHALATION TREATMENT: CPT

## 2023-04-03 PROCEDURE — 36415 COLL VENOUS BLD VENIPUNCTURE: CPT

## 2023-04-03 PROCEDURE — 81001 URINALYSIS AUTO W/SCOPE: CPT

## 2023-04-03 RX ORDER — 0.9 % SODIUM CHLORIDE 0.9 %
500 INTRAVENOUS SOLUTION INTRAVENOUS ONCE
Status: COMPLETED | OUTPATIENT
Start: 2023-04-03 | End: 2023-04-03

## 2023-04-03 RX ORDER — CYCLOBENZAPRINE HCL 5 MG
1 TABLET ORAL
COMMUNITY
Start: 2023-03-03 | End: 2023-04-03 | Stop reason: SDUPTHER

## 2023-04-03 RX ORDER — MORPHINE SULFATE 4 MG/ML
4 INJECTION, SOLUTION INTRAMUSCULAR; INTRAVENOUS ONCE
Status: COMPLETED | OUTPATIENT
Start: 2023-04-03 | End: 2023-04-03

## 2023-04-03 RX ORDER — ALBUTEROL SULFATE 2.5 MG/3ML
2.5 SOLUTION RESPIRATORY (INHALATION) EVERY 6 HOURS PRN
Status: DISCONTINUED | OUTPATIENT
Start: 2023-04-03 | End: 2023-04-04 | Stop reason: HOSPADM

## 2023-04-03 RX ORDER — MORPHINE SULFATE 2 MG/ML
2 INJECTION, SOLUTION INTRAMUSCULAR; INTRAVENOUS ONCE
Status: DISCONTINUED | OUTPATIENT
Start: 2023-04-03 | End: 2023-04-04

## 2023-04-03 RX ORDER — ONDANSETRON 2 MG/ML
4 INJECTION INTRAMUSCULAR; INTRAVENOUS ONCE
Status: COMPLETED | OUTPATIENT
Start: 2023-04-03 | End: 2023-04-03

## 2023-04-03 RX ORDER — 0.9 % SODIUM CHLORIDE 0.9 %
1000 INTRAVENOUS SOLUTION INTRAVENOUS ONCE
Status: DISCONTINUED | OUTPATIENT
Start: 2023-04-03 | End: 2023-04-03

## 2023-04-03 RX ORDER — KETOROLAC TROMETHAMINE 15 MG/ML
15 INJECTION, SOLUTION INTRAMUSCULAR; INTRAVENOUS ONCE
Status: COMPLETED | OUTPATIENT
Start: 2023-04-03 | End: 2023-04-03

## 2023-04-03 RX ORDER — DEXAMETHASONE SODIUM PHOSPHATE 10 MG/ML
10 INJECTION, SOLUTION INTRAMUSCULAR; INTRAVENOUS ONCE
Status: COMPLETED | OUTPATIENT
Start: 2023-04-03 | End: 2023-04-03

## 2023-04-03 RX ORDER — CYCLOBENZAPRINE HCL 10 MG
10 TABLET ORAL 3 TIMES DAILY PRN
Qty: 12 TABLET | Refills: 0 | Status: SHIPPED | OUTPATIENT
Start: 2023-04-03 | End: 2023-04-07

## 2023-04-03 RX ORDER — CYCLOBENZAPRINE HCL 5 MG
5 TABLET ORAL
Qty: 30 TABLET | Refills: 3 | Status: SHIPPED | OUTPATIENT
Start: 2023-04-03 | End: 2023-05-22

## 2023-04-03 RX ORDER — FLUCONAZOLE 150 MG/1
150 TABLET ORAL ONCE
Status: COMPLETED | OUTPATIENT
Start: 2023-04-03 | End: 2023-04-03

## 2023-04-03 RX ORDER — HYDROCODONE BITARTRATE AND ACETAMINOPHEN 5; 325 MG/1; MG/1
1 TABLET ORAL EVERY 6 HOURS PRN
Qty: 12 TABLET | Refills: 0 | Status: SHIPPED | OUTPATIENT
Start: 2023-04-03 | End: 2023-04-06

## 2023-04-03 RX ADMIN — SODIUM CHLORIDE 500 ML: 9 INJECTION, SOLUTION INTRAVENOUS at 21:46

## 2023-04-03 RX ADMIN — ONDANSETRON 4 MG: 2 INJECTION INTRAMUSCULAR; INTRAVENOUS at 21:46

## 2023-04-03 RX ADMIN — FLUCONAZOLE 150 MG: 150 TABLET ORAL at 23:48

## 2023-04-03 RX ADMIN — ALBUTEROL SULFATE 2.5 MG: 2.5 SOLUTION RESPIRATORY (INHALATION) at 23:48

## 2023-04-03 RX ADMIN — MORPHINE SULFATE 4 MG: 4 INJECTION, SOLUTION INTRAMUSCULAR; INTRAVENOUS at 21:47

## 2023-04-03 RX ADMIN — DEXAMETHASONE SODIUM PHOSPHATE 10 MG: 10 INJECTION, SOLUTION INTRAMUSCULAR; INTRAVENOUS at 23:48

## 2023-04-03 RX ADMIN — KETOROLAC TROMETHAMINE 15 MG: 15 INJECTION, SOLUTION INTRAMUSCULAR; INTRAVENOUS at 21:47

## 2023-04-03 ASSESSMENT — ENCOUNTER SYMPTOMS
CONSTIPATION: 0
COUGH: 0
COLOR CHANGE: 0
SORE THROAT: 0
SINUS PAIN: 0
ABDOMINAL PAIN: 0
EYE REDNESS: 0
DIARRHEA: 0
NAUSEA: 0
EYE DISCHARGE: 0
VOMITING: 0
BACK PAIN: 1
SHORTNESS OF BREATH: 0

## 2023-04-03 ASSESSMENT — PAIN DESCRIPTION - ORIENTATION
ORIENTATION: RIGHT
ORIENTATION: RIGHT

## 2023-04-03 ASSESSMENT — PAIN SCALES - GENERAL
PAINLEVEL_OUTOF10: 10
PAINLEVEL_OUTOF10: 5

## 2023-04-03 ASSESSMENT — PAIN DESCRIPTION - LOCATION
LOCATION: BACK;FLANK
LOCATION: BACK

## 2023-04-03 ASSESSMENT — PAIN - FUNCTIONAL ASSESSMENT: PAIN_FUNCTIONAL_ASSESSMENT: 0-10

## 2023-04-03 ASSESSMENT — PAIN DESCRIPTION - DESCRIPTORS: DESCRIPTORS: SHARP;SHOOTING

## 2023-04-04 ENCOUNTER — APPOINTMENT (OUTPATIENT)
Dept: CT IMAGING | Age: 68
End: 2023-04-04
Payer: MEDICARE

## 2023-04-04 VITALS
HEART RATE: 98 BPM | WEIGHT: 220 LBS | OXYGEN SATURATION: 92 % | SYSTOLIC BLOOD PRESSURE: 125 MMHG | HEIGHT: 65 IN | DIASTOLIC BLOOD PRESSURE: 87 MMHG | BODY MASS INDEX: 36.65 KG/M2 | TEMPERATURE: 98.9 F | RESPIRATION RATE: 18 BRPM

## 2023-04-04 LAB
D DIMER PPP FEU-MCNC: 0.67 MG/L FEU (ref 0–0.5)
EKG ATRIAL RATE: 91 BPM
EKG P AXIS: 40 DEGREES
EKG P-R INTERVAL: 166 MS
EKG Q-T INTERVAL: 392 MS
EKG QRS DURATION: 136 MS
EKG QTC CALCULATION (BAZETT): 482 MS
EKG R AXIS: -60 DEGREES
EKG T AXIS: 13 DEGREES
EKG VENTRICULAR RATE: 91 BPM
TROPONIN T SERPL-MCNC: <0.01 NG/ML (ref 0–0.01)

## 2023-04-04 PROCEDURE — 6370000000 HC RX 637 (ALT 250 FOR IP): Performed by: EMERGENCY MEDICINE

## 2023-04-04 PROCEDURE — 93005 ELECTROCARDIOGRAM TRACING: CPT

## 2023-04-04 PROCEDURE — 93010 ELECTROCARDIOGRAM REPORT: CPT | Performed by: INTERNAL MEDICINE

## 2023-04-04 PROCEDURE — 36415 COLL VENOUS BLD VENIPUNCTURE: CPT

## 2023-04-04 PROCEDURE — 6360000002 HC RX W HCPCS: Performed by: EMERGENCY MEDICINE

## 2023-04-04 PROCEDURE — 84484 ASSAY OF TROPONIN QUANT: CPT

## 2023-04-04 PROCEDURE — 94640 AIRWAY INHALATION TREATMENT: CPT

## 2023-04-04 PROCEDURE — 6360000004 HC RX CONTRAST MEDICATION: Performed by: EMERGENCY MEDICINE

## 2023-04-04 PROCEDURE — 85379 FIBRIN DEGRADATION QUANT: CPT

## 2023-04-04 PROCEDURE — 71275 CT ANGIOGRAPHY CHEST: CPT

## 2023-04-04 RX ORDER — AMOXICILLIN AND CLAVULANATE POTASSIUM 875; 125 MG/1; MG/1
1 TABLET, FILM COATED ORAL 2 TIMES DAILY
Qty: 20 TABLET | Refills: 0 | Status: SHIPPED | OUTPATIENT
Start: 2023-04-04 | End: 2023-04-14

## 2023-04-04 RX ORDER — HYDROCODONE BITARTRATE AND ACETAMINOPHEN 5; 325 MG/1; MG/1
1 TABLET ORAL ONCE
Status: COMPLETED | OUTPATIENT
Start: 2023-04-04 | End: 2023-04-04

## 2023-04-04 RX ORDER — ALBUTEROL SULFATE 2.5 MG/3ML
2.5 SOLUTION RESPIRATORY (INHALATION) EVERY 6 HOURS PRN
Status: DISCONTINUED | OUTPATIENT
Start: 2023-04-04 | End: 2023-04-04 | Stop reason: HOSPADM

## 2023-04-04 RX ORDER — ALBUTEROL SULFATE 2.5 MG/3ML
2.5 SOLUTION RESPIRATORY (INHALATION) ONCE
Status: COMPLETED | OUTPATIENT
Start: 2023-04-04 | End: 2023-04-04

## 2023-04-04 RX ORDER — PREDNISONE 20 MG/1
40 TABLET ORAL DAILY
Qty: 8 TABLET | Refills: 0 | Status: SHIPPED | OUTPATIENT
Start: 2023-04-04 | End: 2023-04-08

## 2023-04-04 RX ADMIN — ALBUTEROL SULFATE 2.5 MG: 2.5 SOLUTION RESPIRATORY (INHALATION) at 00:17

## 2023-04-04 RX ADMIN — ALBUTEROL SULFATE 2.5 MG: 2.5 SOLUTION RESPIRATORY (INHALATION) at 00:57

## 2023-04-04 RX ADMIN — HYDROCODONE BITARTRATE AND ACETAMINOPHEN 1 TABLET: 5; 325 TABLET ORAL at 00:30

## 2023-04-04 RX ADMIN — IOPAMIDOL 75 ML: 755 INJECTION, SOLUTION INTRAVENOUS at 01:36

## 2023-04-04 NOTE — ED PROVIDER NOTES
Daily     LISINOPRIL (PRINIVIL;ZESTRIL) 5 MG TABLET    Take 1 tablet by mouth daily    LORAZEPAM (ATIVAN) 1 MG TABLET    Take 0.5 tablets by mouth every 8 hours as needed for Anxiety. METOPROLOL SUCCINATE (TOPROL XL) 50 MG EXTENDED RELEASE TABLET    Take 1 tablet by mouth daily    MISC NATURAL PRODUCTS (GLUCOSAMINE CHOND CMP ADVANCED PO)    Take by mouth Once a day    MULTIPLE VITAMINS-MINERALS (THERAPEUTIC MULTIVITAMIN-MINERALS) TABLET    Take 1 tablet by mouth daily    NICOTINE (NICODERM CQ) 14 MG/24HR    Place 1 patch onto the skin daily    NICOTINE (NICODERM CQ) 7 MG/24HR    Place 1 patch onto the skin every 24 hours Start taking this 7 mg low dose patch after you complete 14 days of medium dose patch 14mg daily    OMEPRAZOLE (PRILOSEC) 20 MG DELAYED RELEASE CAPSULE    Take 1 capsule by mouth daily    PREGABALIN (LYRICA) 150 MG CAPSULE    Take 1 capsule by mouth daily. ROSUVASTATIN (CRESTOR) 5 MG TABLET    Take 5 mg by mouth daily    VENLAFAXINE (EFFEXOR XR) 150 MG EXTENDED RELEASE CAPSULE    Take 1 capsule by mouth daily    VITAMIN B-12 (CYANOCOBALAMIN) 1000 MCG TABLET    Take 5,000 mcg by mouth daily       ALLERGIES     Carrot, Cashew nut oil, Celery oil, Ciprofloxacin, Sulfa antibiotics, and Zithromax [azithromycin]    FAMILY HISTORY     History reviewed. No pertinent family history. SOCIAL HISTORY       Social History     Socioeconomic History    Marital status:       Spouse name: None    Number of children: None    Years of education: None    Highest education level: None   Tobacco Use    Smoking status: Every Day     Types: Cigarettes    Smokeless tobacco: Never    Tobacco comments:     \"a cigarette and a half a day\"   Vaping Use    Vaping Use: Never used   Substance and Sexual Activity    Alcohol use: No    Drug use: No       PHYSICAL EXAM    (up to 7 for level 4, 8 or more for level 5)     ED Triage Vitals [04/03/23 2108]   BP Temp Temp Source Heart Rate Resp SpO2 Height

## 2023-04-04 NOTE — ED NOTES
Pt. Placed on 2 liters NC for SaO2 85-88%. Pt. Repositioned for comfort and for optimal resp. Effort. She does have insp/exp wheezes, Cardio called to give nebulizer treatment. Pt. States she doesn't feel SOB but does exhibit exertional SOB even with position changes in the bed.       Sharlynn Bence, RN  04/03/23 0884

## 2023-04-04 NOTE — ED NOTES
Pt. Ccontinues to drop her SaO2 into low to mid 80's. Pt. Continues to have exertional SOB with any minimal activities. She had an SaO2 of 92% on arrival but it has steadily decreased since she has been here. Pt. Admits she does not use her COPD inhalers at home and does not check her SaO2 at home. Pt. Denies chest pain. D dimer was added and is elevated, pt. Will go for CT of chest to r/o PE. Cardio at bedside admin. 3rd nebulizer and EKG completed. Troponin added on.        Elida Hernandez RN  04/04/23 3957

## 2023-04-04 NOTE — ED NOTES
Pt. Again placed on 2 liters NC for SaO2 77%. Dr. Helena Sher aware.       Alejandro Wayne RN  04/04/23 1757

## 2023-04-04 NOTE — ED NOTES
Pt. Taken off O2 while being d/c'd. She drops her SaO2 to 83-84% and is visibly SOB while at rest.  RN explains the dangers of going home as she has no O2 at home. RN explains she must use her inhalers as prescribed and take her new prescriptions as prescribed and come back STAT if she feels distressed. Pt. Does live with her sister who is at bedside with her. She continues to refuse admission and is signing out AMA.       Ryan Bautista RN  04/04/23 900 [facility], RN  04/04/23 9006

## 2023-04-17 ENCOUNTER — OFFICE VISIT (OUTPATIENT)
Dept: PRIMARY CARE | Facility: CLINIC | Age: 68
End: 2023-04-17
Payer: MEDICARE

## 2023-04-17 VITALS
HEIGHT: 63 IN | WEIGHT: 205 LBS | TEMPERATURE: 98.1 F | BODY MASS INDEX: 36.32 KG/M2 | RESPIRATION RATE: 18 BRPM | SYSTOLIC BLOOD PRESSURE: 138 MMHG | DIASTOLIC BLOOD PRESSURE: 84 MMHG | HEART RATE: 74 BPM

## 2023-04-17 DIAGNOSIS — J18.9 PNEUMONIA OF LOWER LOBE DUE TO INFECTIOUS ORGANISM, UNSPECIFIED LATERALITY: Primary | ICD-10-CM

## 2023-04-17 DIAGNOSIS — E66.01 OBESITY, MORBID (MULTI): ICD-10-CM

## 2023-04-17 DIAGNOSIS — I72.9 ANEURYSM (CMS-HCC): ICD-10-CM

## 2023-04-17 DIAGNOSIS — J41.0 SIMPLE CHRONIC BRONCHITIS (MULTI): ICD-10-CM

## 2023-04-17 DIAGNOSIS — E11.42 DIABETIC PERIPHERAL NEUROPATHY (MULTI): ICD-10-CM

## 2023-04-17 PROBLEM — G43.909 MIGRAINE HEADACHE: Status: ACTIVE | Noted: 2023-04-17

## 2023-04-17 PROBLEM — I63.9 CVA (CEREBRAL VASCULAR ACCIDENT) (MULTI): Status: ACTIVE | Noted: 2023-04-17

## 2023-04-17 PROBLEM — F32.A DEPRESSION: Status: ACTIVE | Noted: 2023-04-17

## 2023-04-17 PROBLEM — J44.9 COPD (CHRONIC OBSTRUCTIVE PULMONARY DISEASE) (MULTI): Status: ACTIVE | Noted: 2023-04-17

## 2023-04-17 PROBLEM — M19.90 OSTEOARTHRITIS: Status: ACTIVE | Noted: 2023-04-17

## 2023-04-17 PROBLEM — I51.9 MILD DIASTOLIC DYSFUNCTION: Status: ACTIVE | Noted: 2023-04-17

## 2023-04-17 PROBLEM — E03.9 HYPOTHYROIDISM: Status: ACTIVE | Noted: 2023-04-17

## 2023-04-17 PROBLEM — I10 BENIGN ESSENTIAL HTN: Status: ACTIVE | Noted: 2023-04-17

## 2023-04-17 PROBLEM — D64.9 ANEMIA: Status: ACTIVE | Noted: 2023-04-17

## 2023-04-17 PROBLEM — Z96.652 HISTORY OF TOTAL LEFT KNEE REPLACEMENT: Status: ACTIVE | Noted: 2023-04-17

## 2023-04-17 PROBLEM — K21.9 GERD (GASTROESOPHAGEAL REFLUX DISEASE): Status: ACTIVE | Noted: 2023-04-17

## 2023-04-17 PROBLEM — J38.3 SPASTIC DYSPHONIA: Status: ACTIVE | Noted: 2023-04-17

## 2023-04-17 PROBLEM — F90.9 ADULT ADHD: Status: ACTIVE | Noted: 2023-04-17

## 2023-04-17 PROBLEM — E78.5 HLD (HYPERLIPIDEMIA): Status: ACTIVE | Noted: 2023-04-17

## 2023-04-17 PROBLEM — N39.0 ACUTE UTI (URINARY TRACT INFECTION): Status: ACTIVE | Noted: 2023-04-17

## 2023-04-17 PROBLEM — F41.9 ANXIETY: Status: ACTIVE | Noted: 2023-04-17

## 2023-04-17 PROBLEM — A41.9 SEPTICEMIA (MULTI): Status: ACTIVE | Noted: 2023-04-17

## 2023-04-17 PROBLEM — J45.909 ASTHMA (HHS-HCC): Status: ACTIVE | Noted: 2023-04-17

## 2023-04-17 PROBLEM — I45.10 RIGHT BUNDLE BRANCH BLOCK (RBBB) ON ELECTROCARDIOGRAPHY: Status: ACTIVE | Noted: 2023-04-17

## 2023-04-17 PROCEDURE — 4004F PT TOBACCO SCREEN RCVD TLK: CPT | Performed by: FAMILY MEDICINE

## 2023-04-17 PROCEDURE — 1159F MED LIST DOCD IN RCRD: CPT | Performed by: FAMILY MEDICINE

## 2023-04-17 PROCEDURE — 3075F SYST BP GE 130 - 139MM HG: CPT | Performed by: FAMILY MEDICINE

## 2023-04-17 PROCEDURE — 1157F ADVNC CARE PLAN IN RCRD: CPT | Performed by: FAMILY MEDICINE

## 2023-04-17 PROCEDURE — 99214 OFFICE O/P EST MOD 30 MIN: CPT | Performed by: FAMILY MEDICINE

## 2023-04-17 PROCEDURE — 3079F DIAST BP 80-89 MM HG: CPT | Performed by: FAMILY MEDICINE

## 2023-04-17 PROCEDURE — 4010F ACE/ARB THERAPY RXD/TAKEN: CPT | Performed by: FAMILY MEDICINE

## 2023-04-17 RX ORDER — LEVOTHYROXINE SODIUM 150 UG/1
150 TABLET ORAL DAILY
COMMUNITY
End: 2023-07-19 | Stop reason: SDUPTHER

## 2023-04-17 RX ORDER — ALBUTEROL SULFATE 90 UG/1
2 AEROSOL, METERED RESPIRATORY (INHALATION) EVERY 6 HOURS PRN
Qty: 18 G | Refills: 11 | Status: SHIPPED | OUTPATIENT
Start: 2023-04-17 | End: 2024-03-01 | Stop reason: SDUPTHER

## 2023-04-17 RX ORDER — ROSUVASTATIN CALCIUM 5 MG/1
5 TABLET, COATED ORAL DAILY
COMMUNITY
End: 2023-04-20 | Stop reason: SDUPTHER

## 2023-04-17 RX ORDER — OMEPRAZOLE 20 MG/1
1 CAPSULE, DELAYED RELEASE ORAL DAILY
COMMUNITY
Start: 2019-10-01 | End: 2024-03-14

## 2023-04-17 RX ORDER — GUAIFENESIN 600 MG/1
600 TABLET, EXTENDED RELEASE ORAL 2 TIMES DAILY PRN
COMMUNITY
Start: 2021-12-29 | End: 2023-11-20 | Stop reason: ALTCHOICE

## 2023-04-17 RX ORDER — CALCIUM CITRATE/VITAMIN D3 315MG-6.25
1 TABLET ORAL 2 TIMES DAILY
COMMUNITY
End: 2023-11-20 | Stop reason: ALTCHOICE

## 2023-04-17 RX ORDER — ARIPIPRAZOLE 2 MG/1
5 TABLET ORAL DAILY
COMMUNITY
End: 2023-10-17 | Stop reason: ALTCHOICE

## 2023-04-17 RX ORDER — PROMETHAZINE HYDROCHLORIDE 25 MG/1
25 TABLET ORAL EVERY 6 HOURS PRN
COMMUNITY
End: 2023-05-30

## 2023-04-17 RX ORDER — CALCIUM CARBONATE 600 MG
1 TABLET ORAL DAILY
COMMUNITY

## 2023-04-17 RX ORDER — FLUTICASONE PROPIONATE AND SALMETEROL 500; 50 UG/1; UG/1
1 POWDER RESPIRATORY (INHALATION)
Qty: 60 EACH | Refills: 11 | Status: SHIPPED | OUTPATIENT
Start: 2023-04-17 | End: 2024-03-01 | Stop reason: SDUPTHER

## 2023-04-17 RX ORDER — FLUTICASONE PROPIONATE AND SALMETEROL 250; 50 UG/1; UG/1
1 POWDER RESPIRATORY (INHALATION)
COMMUNITY
Start: 2022-11-10 | End: 2023-04-17

## 2023-04-17 RX ORDER — FERROUS SULFATE 325(65) MG
325 TABLET ORAL
COMMUNITY
End: 2023-04-17 | Stop reason: ALTCHOICE

## 2023-04-17 RX ORDER — PREDNISOLONE ACETATE 10 MG/ML
1 SUSPENSION/ DROPS OPHTHALMIC
COMMUNITY
Start: 2022-12-06

## 2023-04-17 RX ORDER — LANOLIN ALCOHOL/MO/W.PET/CERES
5000 CREAM (GRAM) TOPICAL DAILY
COMMUNITY
End: 2023-11-20 | Stop reason: ALTCHOICE

## 2023-04-17 RX ORDER — ASPIRIN 81 MG/1
1 TABLET ORAL DAILY
COMMUNITY
Start: 2021-08-20

## 2023-04-17 RX ORDER — LISINOPRIL 5 MG/1
10 TABLET ORAL DAILY
COMMUNITY
Start: 2021-08-20 | End: 2023-07-25 | Stop reason: SDUPTHER

## 2023-04-17 RX ORDER — ALBUTEROL SULFATE 90 UG/1
2 AEROSOL, METERED RESPIRATORY (INHALATION) EVERY 6 HOURS PRN
COMMUNITY
Start: 2021-12-29 | End: 2023-04-17 | Stop reason: SDUPTHER

## 2023-04-17 RX ORDER — IBUPROFEN 200 MG
1 TABLET ORAL EVERY 24 HOURS
COMMUNITY
Start: 2021-12-30 | End: 2023-11-20 | Stop reason: ALTCHOICE

## 2023-04-17 RX ORDER — GLUCOSAMINE/CHONDROITIN/C/MANG 500-400 MG
1 CAPSULE ORAL DAILY
COMMUNITY

## 2023-04-17 RX ORDER — CLOPIDOGREL BISULFATE 75 MG/1
1 TABLET ORAL DAILY
COMMUNITY
Start: 2021-08-20 | End: 2023-09-05

## 2023-04-17 RX ORDER — SUMATRIPTAN SUCCINATE 100 MG/1
100 TABLET ORAL ONCE AS NEEDED
COMMUNITY
End: 2023-11-20 | Stop reason: ALTCHOICE

## 2023-04-17 RX ORDER — CYCLOPENTOLATE HYDROCHLORIDE 10 MG/ML
1 SOLUTION/ DROPS OPHTHALMIC EVERY 8 HOURS PRN
COMMUNITY
Start: 2022-12-06

## 2023-04-17 RX ORDER — PNV NO.95/FERROUS FUM/FOLIC AC 28MG-0.8MG
100 TABLET ORAL DAILY
COMMUNITY

## 2023-04-17 RX ORDER — VENLAFAXINE HYDROCHLORIDE 150 MG/1
1 CAPSULE, EXTENDED RELEASE ORAL DAILY
COMMUNITY
End: 2024-01-02

## 2023-04-17 RX ORDER — NICOTINE 7MG/24HR
1 PATCH, TRANSDERMAL 24 HOURS TRANSDERMAL EVERY 24 HOURS
COMMUNITY
Start: 2021-12-29 | End: 2023-11-20 | Stop reason: ALTCHOICE

## 2023-04-17 RX ORDER — LORAZEPAM 1 MG/1
0.5 TABLET ORAL EVERY 8 HOURS PRN
COMMUNITY
Start: 2021-12-29 | End: 2023-11-20 | Stop reason: ALTCHOICE

## 2023-04-17 RX ORDER — METOPROLOL SUCCINATE 50 MG/1
1 TABLET, EXTENDED RELEASE ORAL DAILY
COMMUNITY
Start: 2019-12-20 | End: 2023-08-24

## 2023-04-17 NOTE — ASSESSMENT & PLAN NOTE
She was certainly had an exacerbation of her COPD and asthma secondary to the pneumonia.  On examination today she is wheezing significantly so I will go ahead and increase her base dose of Advair to 500/50 mg.

## 2023-04-17 NOTE — PROGRESS NOTES
Subjective   Minoo Aguilera is a 68 y.o. female who presents for ER Follow-up.  She was at [facility] ER on 4/3/23 for right sided low back pain without sciatica.  I reviewed the hospital records extensively from her trip to the [facility] emergency room.  She apparently presented for back pain but was found to have a potential lower lobe bilateral pneumonia.  I reviewed the CT scan of the chest, abdomen, lumbar spine and this was the only acute finding.  She had a leukocytosis and her urine was clear.  Her oxygen was desaturating to 85% according to the hospital records and she was advised to stay in the hospital but declined and went home AGAINST MEDICAL ADVICE.  The emergency room doctor sent her home with Augmentin and an oral prednisone taper for an asthma exacerbation.  She took these meds and feels significantly better and back to baseline today.      Review of Systems  Visit Vitals  /84   Pulse 74   Temp 36.7 °C (98.1 °F)   Resp 18      Objective   Physical Exam  Constitutional:       Appearance: Normal appearance.   HENT:      Head: Normocephalic.   Eyes:      Conjunctiva/sclera: Conjunctivae normal.   Cardiovascular:      Rate and Rhythm: Normal rate and regular rhythm.   Pulmonary:      Effort: Pulmonary effort is normal.      Breath sounds: Wheezing present. No rhonchi or rales.   Musculoskeletal:      Cervical back: Neck supple.   Skin:     General: Skin is warm and dry.   Neurological:      Mental Status: She is alert.         Assessment/Plan    Problem List Items Addressed This Visit       Diabetic peripheral neuropathy (CMS/HCC)    Aneurysm (CMS/HCC)    COPD (chronic obstructive pulmonary disease) (CMS/HCC)     She was certainly had an exacerbation of her COPD and asthma secondary to the pneumonia.  On examination today she is wheezing significantly so I will go ahead and increase her base dose of Advair to 500/50 mg.         Obesity, morbid (CMS/HCC)     Other Visit Diagnoses       Pneumonia of  lower lobe due to infectious organism, unspecified laterality    -  Primary    Relevant Medications    fluticasone propion-salmeteroL (Advair Diskus) 500-50 mcg/dose diskus inhaler    albuterol 90 mcg/actuation inhaler        Based on her improvement it seems that she did have a bibasilar pneumonia with exacerbation of her asthma and COPD.  She has responded well to Augmentin and oral prednisone both of which are now complete.  Since she is still wheezing but in no respiratory distress I will increase the dose of her Advair and attempt to prevent future exacerbations.  I did review the hospital records with the patient including CT scan of the chest, abdomen, pelvis, lumbar spine.  We reviewed the urinalysis, CBC, CMP, troponins.

## 2023-04-20 ENCOUNTER — PATIENT OUTREACH (OUTPATIENT)
Dept: PRIMARY CARE | Facility: CLINIC | Age: 68
End: 2023-04-20
Payer: MEDICARE

## 2023-04-20 DIAGNOSIS — I63.9 CEREBROVASCULAR ACCIDENT (CVA), UNSPECIFIED MECHANISM (MULTI): Primary | ICD-10-CM

## 2023-04-20 DIAGNOSIS — J44.9 CHRONIC OBSTRUCTIVE PULMONARY DISEASE, UNSPECIFIED COPD TYPE (MULTI): ICD-10-CM

## 2023-04-20 DIAGNOSIS — I10 BENIGN ESSENTIAL HTN: ICD-10-CM

## 2023-04-20 PROCEDURE — 99490 CHRNC CARE MGMT STAFF 1ST 20: CPT | Performed by: FAMILY MEDICINE

## 2023-04-20 RX ORDER — ROSUVASTATIN CALCIUM 5 MG/1
5 TABLET, COATED ORAL DAILY
Qty: 90 TABLET | Refills: 3 | Status: SHIPPED | OUTPATIENT
Start: 2023-04-20 | End: 2024-04-19

## 2023-04-20 NOTE — PROGRESS NOTES
RN JAMIE reached out to see how patient is doing and introduce myself as well as explain that a new permanent RN JAMIE was hired for Dr. Aguirre's office. I provided my contact information in case she has concerns that arise in between calls and explained once Marybeth has a dedicated phone number we will provide all that information to her. Minoo reports that she is doing much better since her ER visit at the beginning of the month and that she is using the new dosage of her Advair inhaler that Dr. Aguirre ordered during their visit Monday. We reviewed her medications and she requested a refill on her Rosuvastatin, she forgot to ask for one during her visit Monday. A refill approval was sent to Dr. Aguirre. We discussed her checking her oxygen levels and blood pressure at home, she denies doing this. Explained the benefits of checking especially since she had trouble during her ER visit with her oxygen level. She denies any concerns at this time. Ensured she can reach out to myself for non-emergent concerns and I would update Marybeth once she is done training.

## 2023-05-09 ENCOUNTER — PATIENT OUTREACH (OUTPATIENT)
Dept: PRIMARY CARE | Facility: CLINIC | Age: 68
End: 2023-05-09
Payer: MEDICARE

## 2023-05-09 DIAGNOSIS — J44.9 CHRONIC OBSTRUCTIVE PULMONARY DISEASE, UNSPECIFIED COPD TYPE (MULTI): ICD-10-CM

## 2023-05-09 DIAGNOSIS — I10 BENIGN ESSENTIAL HTN: ICD-10-CM

## 2023-05-09 PROCEDURE — 99490 CHRNC CARE MGMT STAFF 1ST 20: CPT | Performed by: FAMILY MEDICINE

## 2023-05-17 ENCOUNTER — TELEPHONE (OUTPATIENT)
Dept: PRIMARY CARE | Facility: CLINIC | Age: 68
End: 2023-05-17
Payer: MEDICARE

## 2023-05-17 DIAGNOSIS — M54.31 SCIATICA OF RIGHT SIDE: Primary | ICD-10-CM

## 2023-05-17 RX ORDER — PREDNISONE 50 MG/1
50 TABLET ORAL DAILY
Qty: 5 TABLET | Refills: 0 | Status: SHIPPED | OUTPATIENT
Start: 2023-05-17 | End: 2023-05-22

## 2023-05-21 DIAGNOSIS — E11.42 DIABETIC PERIPHERAL NEUROPATHY (MULTI): ICD-10-CM

## 2023-05-22 RX ORDER — CYCLOBENZAPRINE HCL 5 MG
TABLET ORAL
Qty: 30 TABLET | Refills: 0 | Status: SHIPPED | OUTPATIENT
Start: 2023-05-22 | End: 2023-11-20 | Stop reason: ALTCHOICE

## 2023-05-29 DIAGNOSIS — K21.9 GASTROESOPHAGEAL REFLUX DISEASE WITHOUT ESOPHAGITIS: Primary | ICD-10-CM

## 2023-05-29 RX ORDER — GLUCOSAMINE/MSM/CHONDROIT SULF 500-166.6
TABLET ORAL
COMMUNITY
End: 2023-11-20 | Stop reason: ALTCHOICE

## 2023-05-29 RX ORDER — DIFLUPREDNATE OPHTHALMIC 0.5 MG/ML
EMULSION OPHTHALMIC
COMMUNITY

## 2023-05-29 RX ORDER — METHYLPHENIDATE HYDROCHLORIDE 20 MG/1
20 TABLET ORAL 2 TIMES DAILY
COMMUNITY
End: 2023-11-20 | Stop reason: ALTCHOICE

## 2023-05-29 RX ORDER — METOPROLOL TARTRATE 100 MG/1
50 TABLET ORAL
COMMUNITY
End: 2023-11-20 | Stop reason: ALTCHOICE

## 2023-05-29 RX ORDER — ETODOLAC 400 MG/1
400 TABLET, FILM COATED ORAL 2 TIMES DAILY
COMMUNITY
End: 2023-11-20 | Stop reason: ALTCHOICE

## 2023-05-30 RX ORDER — PROMETHAZINE HYDROCHLORIDE 25 MG/1
TABLET ORAL
Qty: 30 TABLET | Refills: 0 | Status: SHIPPED | OUTPATIENT
Start: 2023-05-30 | End: 2023-10-03

## 2023-06-06 ENCOUNTER — OFFICE VISIT (OUTPATIENT)
Dept: PRIMARY CARE | Facility: CLINIC | Age: 68
End: 2023-06-06
Payer: MEDICARE

## 2023-06-06 VITALS
WEIGHT: 196 LBS | DIASTOLIC BLOOD PRESSURE: 92 MMHG | HEART RATE: 84 BPM | TEMPERATURE: 97.8 F | RESPIRATION RATE: 24 BRPM | SYSTOLIC BLOOD PRESSURE: 132 MMHG | OXYGEN SATURATION: 98 % | BODY MASS INDEX: 34.72 KG/M2

## 2023-06-06 DIAGNOSIS — R39.9 UTI SYMPTOMS: Primary | ICD-10-CM

## 2023-06-06 PROCEDURE — 99213 OFFICE O/P EST LOW 20 MIN: CPT | Performed by: NURSE PRACTITIONER

## 2023-06-06 PROCEDURE — 3080F DIAST BP >= 90 MM HG: CPT | Performed by: NURSE PRACTITIONER

## 2023-06-06 PROCEDURE — 1157F ADVNC CARE PLAN IN RCRD: CPT | Performed by: NURSE PRACTITIONER

## 2023-06-06 PROCEDURE — 1159F MED LIST DOCD IN RCRD: CPT | Performed by: NURSE PRACTITIONER

## 2023-06-06 PROCEDURE — 4004F PT TOBACCO SCREEN RCVD TLK: CPT | Performed by: NURSE PRACTITIONER

## 2023-06-06 PROCEDURE — 4010F ACE/ARB THERAPY RXD/TAKEN: CPT | Performed by: NURSE PRACTITIONER

## 2023-06-06 PROCEDURE — 3075F SYST BP GE 130 - 139MM HG: CPT | Performed by: NURSE PRACTITIONER

## 2023-06-06 ASSESSMENT — ENCOUNTER SYMPTOMS
SLEEP DISTURBANCE: 0
NAUSEA: 0
DIARRHEA: 1
APPETITE CHANGE: 1
FATIGUE: 0
DYSURIA: 0
ACTIVITY CHANGE: 0
VOMITING: 0
CONSTIPATION: 0
AGITATION: 1
FEVER: 0
COUGH: 0

## 2023-06-06 NOTE — ASSESSMENT & PLAN NOTE
Pt unable to provide sample  Etiology for confusion unknown  Encouraged to go to ER for further evaluation  Provider agreeable; will go to [facility]

## 2023-06-06 NOTE — PROGRESS NOTES
Subjective   Patient ID: Minoo Aguilera is a 68 y.o. female who presents for UTI.    Pt here with sister  Increase in agitation and confusion; x2 days  2 UTI's last year that ended up in the ER  Stomach ache- has not been eating a lot in the last several days  Confusion; same as last time she had UTI  No fever or chills  Pt Drove to son's house on Sunday      Pt unable to provide sample  Encouraged to go to the ER        UTI   This is a new problem. The current episode started yesterday. The problem has been rapidly worsening. Pertinent negatives include no nausea or vomiting. Associated symptoms comments: Confusion  . She has tried nothing for the symptoms.        Review of Systems   Constitutional:  Positive for appetite change. Negative for activity change, fatigue and fever.   HENT:  Negative for congestion.    Respiratory:  Negative for cough.    Gastrointestinal:  Positive for diarrhea. Negative for constipation, nausea and vomiting.   Genitourinary:  Negative for dysuria.   Psychiatric/Behavioral:  Positive for agitation. Negative for sleep disturbance.        Objective   BP (!) 132/92   Pulse 84   Temp 36.6 °C (97.8 °F) (Temporal)   Resp 24   Wt 88.9 kg (196 lb)   SpO2 98%   BMI 34.72 kg/m²     Physical Exam  Vitals reviewed. Exam conducted with a chaperone present.   Constitutional:       General: She is in acute distress.      Appearance: Normal appearance.   HENT:      Head: Normocephalic.   Cardiovascular:      Rate and Rhythm: Normal rate and regular rhythm.      Pulses: Normal pulses.      Heart sounds: Normal heart sounds.   Musculoskeletal:      Cervical back: Normal range of motion and neck supple.   Skin:     General: Skin is warm and dry.      Capillary Refill: Capillary refill takes less than 2 seconds.   Neurological:      Mental Status: She is alert. She is disoriented.   Psychiatric:         Mood and Affect: Mood is anxious.         Behavior: Behavior is agitated.         Assessment/Plan    Problem List Items Addressed This Visit          Genitourinary    UTI symptoms - Primary     Pt unable to provide sample  Etiology for confusion unknown  Encouraged to go to ER for further evaluation  Provider agreeable; will go to [place]         Relevant Orders    POCT UA (nonautomated) manually resulted

## 2023-06-08 ENCOUNTER — APPOINTMENT (OUTPATIENT)
Dept: PRIMARY CARE | Facility: CLINIC | Age: 68
End: 2023-06-08
Payer: MEDICARE

## 2023-06-12 ENCOUNTER — PATIENT OUTREACH (OUTPATIENT)
Dept: PRIMARY CARE | Facility: CLINIC | Age: 68
End: 2023-06-12
Payer: MEDICARE

## 2023-06-12 ENCOUNTER — DOCUMENTATION (OUTPATIENT)
Dept: PRIMARY CARE | Facility: CLINIC | Age: 68
End: 2023-06-12
Payer: MEDICARE

## 2023-06-12 DIAGNOSIS — I10 BENIGN ESSENTIAL HTN: ICD-10-CM

## 2023-06-12 DIAGNOSIS — J44.9 CHRONIC OBSTRUCTIVE PULMONARY DISEASE, UNSPECIFIED COPD TYPE (MULTI): ICD-10-CM

## 2023-06-12 DIAGNOSIS — N39.0 URINARY TRACT INFECTION WITHOUT HEMATURIA, SITE UNSPECIFIED: ICD-10-CM

## 2023-06-12 RX ORDER — CEFDINIR 300 MG/1
300 CAPSULE ORAL 2 TIMES DAILY
COMMUNITY
End: 2023-11-20 | Stop reason: ALTCHOICE

## 2023-06-12 RX ORDER — METFORMIN HYDROCHLORIDE 500 MG/1
500 TABLET ORAL
COMMUNITY
End: 2023-09-15 | Stop reason: SINTOL

## 2023-06-14 ENCOUNTER — OFFICE VISIT (OUTPATIENT)
Dept: PRIMARY CARE | Facility: CLINIC | Age: 68
End: 2023-06-14
Payer: MEDICARE

## 2023-06-14 VITALS
HEIGHT: 63 IN | TEMPERATURE: 98.2 F | SYSTOLIC BLOOD PRESSURE: 100 MMHG | WEIGHT: 196 LBS | DIASTOLIC BLOOD PRESSURE: 66 MMHG | RESPIRATION RATE: 16 BRPM | HEART RATE: 60 BPM | BODY MASS INDEX: 34.73 KG/M2

## 2023-06-14 DIAGNOSIS — E11.9 TYPE 2 DIABETES MELLITUS WITHOUT COMPLICATION, WITHOUT LONG-TERM CURRENT USE OF INSULIN (MULTI): ICD-10-CM

## 2023-06-14 DIAGNOSIS — I63.81 CEREBROVASCULAR ACCIDENT (CVA) DUE TO OCCLUSION OF SMALL ARTERY (MULTI): ICD-10-CM

## 2023-06-14 DIAGNOSIS — N30.00 ACUTE CYSTITIS WITHOUT HEMATURIA: Primary | ICD-10-CM

## 2023-06-14 DIAGNOSIS — J41.0 SIMPLE CHRONIC BRONCHITIS (MULTI): ICD-10-CM

## 2023-06-14 PROBLEM — N39.0 ACUTE UTI (URINARY TRACT INFECTION): Status: RESOLVED | Noted: 2023-04-17 | Resolved: 2023-06-14

## 2023-06-14 PROBLEM — A41.9 SEPTICEMIA (MULTI): Status: RESOLVED | Noted: 2023-04-17 | Resolved: 2023-06-14

## 2023-06-14 PROBLEM — R39.9 UTI SYMPTOMS: Status: RESOLVED | Noted: 2023-06-06 | Resolved: 2023-06-14

## 2023-06-14 PROCEDURE — 3044F HG A1C LEVEL LT 7.0%: CPT | Performed by: FAMILY MEDICINE

## 2023-06-14 PROCEDURE — 3074F SYST BP LT 130 MM HG: CPT | Performed by: FAMILY MEDICINE

## 2023-06-14 PROCEDURE — 1159F MED LIST DOCD IN RCRD: CPT | Performed by: FAMILY MEDICINE

## 2023-06-14 PROCEDURE — 1157F ADVNC CARE PLAN IN RCRD: CPT | Performed by: FAMILY MEDICINE

## 2023-06-14 PROCEDURE — 4004F PT TOBACCO SCREEN RCVD TLK: CPT | Performed by: FAMILY MEDICINE

## 2023-06-14 PROCEDURE — 81003 URINALYSIS AUTO W/O SCOPE: CPT | Performed by: FAMILY MEDICINE

## 2023-06-14 PROCEDURE — 4010F ACE/ARB THERAPY RXD/TAKEN: CPT | Performed by: FAMILY MEDICINE

## 2023-06-14 PROCEDURE — 3078F DIAST BP <80 MM HG: CPT | Performed by: FAMILY MEDICINE

## 2023-06-14 PROCEDURE — 99496 TRANSJ CARE MGMT HIGH F2F 7D: CPT | Performed by: FAMILY MEDICINE

## 2023-06-14 RX ORDER — BISACODYL 10 MG/1
10 SUPPOSITORY RECTAL DAILY PRN
COMMUNITY
Start: 2023-06-11

## 2023-06-14 RX ORDER — FERROUS SULFATE 325(65) MG
1 TABLET ORAL DAILY
COMMUNITY
End: 2023-11-20 | Stop reason: ALTCHOICE

## 2023-06-14 NOTE — ASSESSMENT & PLAN NOTE
Extensive imaging at this last hospital visit included CT scan of the head as well as MRI of the head which showed no acute ischemia but scattered small vessel ischemic changes.  We will continue her secondary prevention with Plavix and aspirin.  This also reinforces the tight control of her diabetes.

## 2023-06-14 NOTE — ASSESSMENT & PLAN NOTE
This is a new diagnosis with an A1c of 6.4% at her last hospitalization.  Since she has had evidence of recurrent infection and also has some diffuse microvascular cerebral disease, it makes sense to treat her diabetes more aggressively even though it is very mild right now.  I would support her continuing the metformin 500 mg twice daily and we will follow-up in 3 months for another A1c

## 2023-06-14 NOTE — PROGRESS NOTES
Subjective   Minoo Aguilera is a 68 y.o. female who presents for Hospital Follow-up.  She was hospitalized at [facility] from 6/6/23 - 6/11/23 for a UTI with sepsis.  Her urinalysis on 6/6 showed many white blood cells and red blood cells, she had a leukocytosis of 16.9, but normal kidney function.  A CT scan of the brain was performed and read as negative.  A subsequent MRI was performed showing some scattered areas of remote ischemic injury but nothing acute.  A CT scan of the abdomen pelvis was also performed showing some sludge in the gallbladder but no other acute findings. She has just 1 more dose oral abx.  Her symptoms have all resolved.  She went home with a leung and removed it herself yesterday.  She has voided well since then      Visit Vitals  /66   Pulse 60   Temp 36.8 °C (98.2 °F)   Resp 16      Objective   Physical Exam  Constitutional:       Appearance: Normal appearance.   HENT:      Head: Normocephalic.   Pulmonary:      Effort: Pulmonary effort is normal.   Musculoskeletal:      Cervical back: Neck supple.      Right lower leg: No edema.      Left lower leg: No edema.   Skin:     General: Skin is warm and dry.   Psychiatric:         Mood and Affect: Mood normal.         Assessment/Plan    Problem List Items Addressed This Visit       COPD (chronic obstructive pulmonary disease) (CMS/[facility])    Relevant Orders    Disability Placard    CVA (cerebral vascular accident) (CMS/[facility])     Extensive imaging at this last hospital visit included CT scan of the head as well as MRI of the head which showed no acute ischemia but scattered small vessel ischemic changes.  We will continue her secondary prevention with Plavix and aspirin.  This also reinforces the tight control of her diabetes.         Type 2 diabetes mellitus without complication, without long-term current use of insulin (CMS/[facility])     This is a new diagnosis with an A1c of 6.4% at her last hospitalization.  Since she has had  evidence of recurrent infection and also has some diffuse microvascular cerebral disease, it makes sense to treat her diabetes more aggressively even though it is very mild right now.  I would support her continuing the metformin 500 mg twice daily and we will follow-up in 3 months for another A1c         Relevant Orders    Follow Up In [facility] - PCP     Other Visit Diagnoses       Acute cystitis without hematuria    -  Primary        Today we reviewed extensive hospital records, labs, imaging, transition to care nursing visit note as well.  It seems that clinically she has resolved her urinary tract infection with septicemia.  I advise she finish the last dose of antibiotic and let us know if any symptoms recur in the next few weeks.  She unfortunately pulled her Price catheter out by mistake yesterday but has been urinating well without any hematuria since then.  She had an examination in the emergency room which included a physical pelvic exam which was reported as negative.  Since she is now urinating well I do not believe she needs to see urology.  She was also told to follow-up with neurology due to the microvascular ischemic disease but she is already on Plavix and aspirin combined.  I will be happy to continue to address the rest of her risk factors for secondary prevention.

## 2023-06-21 ENCOUNTER — TELEPHONE (OUTPATIENT)
Dept: PRIMARY CARE | Facility: CLINIC | Age: 68
End: 2023-06-21
Payer: MEDICARE

## 2023-06-21 NOTE — TELEPHONE ENCOUNTER
Pt called, states she has not had an appetite at all since starting Metformin. This is concerning to her and she isnt sure what to do. Please advise.

## 2023-06-23 ENCOUNTER — PATIENT OUTREACH (OUTPATIENT)
Dept: PRIMARY CARE | Facility: CLINIC | Age: 68
End: 2023-06-23
Payer: MEDICARE

## 2023-06-23 DIAGNOSIS — N39.0 URINARY TRACT INFECTION WITHOUT HEMATURIA, SITE UNSPECIFIED: ICD-10-CM

## 2023-06-23 DIAGNOSIS — E11.9 TYPE 2 DIABETES MELLITUS WITHOUT COMPLICATION, WITHOUT LONG-TERM CURRENT USE OF INSULIN (MULTI): ICD-10-CM

## 2023-06-23 DIAGNOSIS — I10 BENIGN ESSENTIAL HTN: ICD-10-CM

## 2023-06-23 DIAGNOSIS — J44.9 CHRONIC OBSTRUCTIVE PULMONARY DISEASE, UNSPECIFIED COPD TYPE (MULTI): ICD-10-CM

## 2023-06-23 DIAGNOSIS — G89.29 CHRONIC LOW BACK PAIN WITHOUT SCIATICA, UNSPECIFIED BACK PAIN LATERALITY: ICD-10-CM

## 2023-06-23 DIAGNOSIS — M54.50 CHRONIC LOW BACK PAIN WITHOUT SCIATICA, UNSPECIFIED BACK PAIN LATERALITY: ICD-10-CM

## 2023-06-23 PROCEDURE — 99490 CHRNC CARE MGMT STAFF 1ST 20: CPT | Performed by: FAMILY MEDICINE

## 2023-06-23 RX ORDER — PREGABALIN 150 MG/1
150 CAPSULE ORAL 2 TIMES DAILY
Qty: 180 CAPSULE | Refills: 0 | Status: SHIPPED | OUTPATIENT
Start: 2023-06-23 | End: 2023-09-21

## 2023-06-23 NOTE — PROGRESS NOTES
CM RN outreach call placed to TCM and CCM encounters.  Spoke to Minoo and she states that she is feeling much better since her hospital stay.  She denies confusion, hematuria, or dysuria. She was experiencing loss of appetite and placed a call to Dr. Aguirre's office staff. Order given to hold metformin until 3mo follow up visit.  Since discontinuing medication, she reports that she is feeling much better and her appetite resumed immediately.  We went over all of her current medications and she had no questions regarding administration. Rx refill request for pregabalin 150mg po BID sent to Dr. Aguirre.  She denies shortness of breath and has not smoked since leaving the hospital.  She is using 14mg nicotine patch.  I encouraged her to resume taking BP at home as she is on metoprolol and lisinopril.

## 2023-07-11 DIAGNOSIS — E11.42 DIABETIC PERIPHERAL NEUROPATHY (MULTI): ICD-10-CM

## 2023-07-11 RX ORDER — CELECOXIB 200 MG/1
CAPSULE ORAL
Qty: 30 CAPSULE | Refills: 3 | Status: SHIPPED | OUTPATIENT
Start: 2023-07-11 | End: 2023-11-07

## 2023-07-17 ENCOUNTER — PATIENT OUTREACH (OUTPATIENT)
Dept: PRIMARY CARE | Facility: CLINIC | Age: 68
End: 2023-07-17
Payer: MEDICARE

## 2023-07-19 DIAGNOSIS — E03.9 HYPOTHYROIDISM, UNSPECIFIED TYPE: ICD-10-CM

## 2023-07-19 RX ORDER — LEVOTHYROXINE SODIUM 150 UG/1
150 TABLET ORAL DAILY
Qty: 90 TABLET | Refills: 3 | Status: SHIPPED | OUTPATIENT
Start: 2023-07-19 | End: 2024-06-03 | Stop reason: SDUPTHER

## 2023-07-25 DIAGNOSIS — I10 BENIGN ESSENTIAL HTN: ICD-10-CM

## 2023-07-25 RX ORDER — LISINOPRIL 10 MG/1
10 TABLET ORAL DAILY
Qty: 90 TABLET | Refills: 3 | Status: SHIPPED | OUTPATIENT
Start: 2023-07-25 | End: 2024-05-06

## 2023-08-01 ENCOUNTER — PATIENT OUTREACH (OUTPATIENT)
Dept: PRIMARY CARE | Facility: CLINIC | Age: 68
End: 2023-08-01
Payer: MEDICARE

## 2023-08-01 DIAGNOSIS — I10 BENIGN ESSENTIAL HTN: ICD-10-CM

## 2023-08-01 DIAGNOSIS — J44.9 CHRONIC OBSTRUCTIVE PULMONARY DISEASE, UNSPECIFIED COPD TYPE (MULTI): ICD-10-CM

## 2023-08-09 ENCOUNTER — TELEPHONE (OUTPATIENT)
Dept: PRIMARY CARE | Facility: CLINIC | Age: 68
End: 2023-08-09
Payer: MEDICARE

## 2023-08-09 DIAGNOSIS — M79.673 PAIN OF FOOT, UNSPECIFIED LATERALITY: Primary | ICD-10-CM

## 2023-08-09 NOTE — TELEPHONE ENCOUNTER
Pt called stating that she is having severe foot pain. She is asking if she should see a podiatrist for this?

## 2023-08-09 NOTE — TELEPHONE ENCOUNTER
Called pt; lvm informing her of referral and advised her to either call the office to help get scheduled or to call referral scheduling at [phone].

## 2023-08-14 ENCOUNTER — TELEPHONE (OUTPATIENT)
Dept: PRIMARY CARE | Facility: CLINIC | Age: 68
End: 2023-08-14
Payer: MEDICARE

## 2023-08-14 LAB
POC APPEARANCE, URINE: ABNORMAL
POC BILIRUBIN, URINE: NEGATIVE
POC BLOOD, URINE: NEGATIVE
POC COLOR, URINE: YELLOW
POC GLUCOSE, URINE: NEGATIVE MG/DL
POC KETONES, URINE: NEGATIVE MG/DL
POC LEUKOCYTES, URINE: NEGATIVE
POC NITRITE,URINE: NEGATIVE
POC PH, URINE: 5.5 PH
POC PROTEIN, URINE: ABNORMAL MG/DL
POC SPECIFIC GRAVITY, URINE: >=1.03
POC UROBILINOGEN, URINE: 0.2 EU/DL

## 2023-08-14 PROCEDURE — 87086 URINE CULTURE/COLONY COUNT: CPT

## 2023-08-14 NOTE — TELEPHONE ENCOUNTER
Pt called, states she believes she is developing a UTI and wants to bring in a specimen. I told her to bring one in and I ordered a UA and culture.

## 2023-08-16 ENCOUNTER — PATIENT OUTREACH (OUTPATIENT)
Dept: PRIMARY CARE | Facility: CLINIC | Age: 68
End: 2023-08-16
Payer: MEDICARE

## 2023-08-16 DIAGNOSIS — I10 BENIGN ESSENTIAL HTN: ICD-10-CM

## 2023-08-16 DIAGNOSIS — J44.9 CHRONIC OBSTRUCTIVE PULMONARY DISEASE, UNSPECIFIED COPD TYPE (MULTI): ICD-10-CM

## 2023-08-16 DIAGNOSIS — M79.673 PAIN OF FOOT, UNSPECIFIED LATERALITY: ICD-10-CM

## 2023-08-16 LAB — URINE CULTURE: NORMAL

## 2023-08-16 PROCEDURE — 99490 CHRNC CARE MGMT STAFF 1ST 20: CPT | Performed by: FAMILY MEDICINE

## 2023-08-16 NOTE — PROGRESS NOTES
RN CM outreach call placed and spoke with Minoo.  She sounded short of breath and when addressed, she stated that she was laying down and may have fallen asleep.  She has been smoking but trying to cut down.  She reports that she checked her BP a few days ago and it was pleasantly normal.  She could not recall exact number.    Minoo reports that she was feeling lethargic and disoriented for past 2 days, she had called into office and UA C&S was ordered.  I reviewed results with pt.  She denies further symptoms.    She has an appointment with Dr. Rodrigez tomorrow for foot pain when elevated.    She denies need for Rx refills or appts.

## 2023-08-24 DIAGNOSIS — I10 BENIGN ESSENTIAL HTN: ICD-10-CM

## 2023-08-24 RX ORDER — METOPROLOL SUCCINATE 50 MG/1
50 TABLET, EXTENDED RELEASE ORAL DAILY
Qty: 90 TABLET | Refills: 0 | Status: SHIPPED | OUTPATIENT
Start: 2023-08-24 | End: 2023-11-21

## 2023-08-28 ENCOUNTER — TELEPHONE (OUTPATIENT)
Dept: PRIMARY CARE | Facility: CLINIC | Age: 68
End: 2023-08-28
Payer: MEDICARE

## 2023-08-28 DIAGNOSIS — Z96.652 HISTORY OF TOTAL LEFT KNEE REPLACEMENT: Primary | ICD-10-CM

## 2023-08-28 RX ORDER — PREDNISONE 50 MG/1
50 TABLET ORAL DAILY
Qty: 5 TABLET | Refills: 0 | Status: SHIPPED | OUTPATIENT
Start: 2023-08-28 | End: 2023-09-02

## 2023-09-04 DIAGNOSIS — E78.5 HYPERLIPIDEMIA, UNSPECIFIED HYPERLIPIDEMIA TYPE: ICD-10-CM

## 2023-09-05 RX ORDER — CLOPIDOGREL BISULFATE 75 MG/1
75 TABLET ORAL DAILY
Qty: 90 TABLET | Refills: 3 | Status: SHIPPED | OUTPATIENT
Start: 2023-09-05

## 2023-09-15 ENCOUNTER — PATIENT OUTREACH (OUTPATIENT)
Dept: PRIMARY CARE | Facility: CLINIC | Age: 68
End: 2023-09-15
Payer: MEDICARE

## 2023-09-15 DIAGNOSIS — I10 BENIGN ESSENTIAL HTN: ICD-10-CM

## 2023-09-15 DIAGNOSIS — E11.9 TYPE 2 DIABETES MELLITUS WITHOUT COMPLICATION, WITHOUT LONG-TERM CURRENT USE OF INSULIN (MULTI): ICD-10-CM

## 2023-09-15 DIAGNOSIS — M19.90 OSTEOARTHRITIS, UNSPECIFIED OSTEOARTHRITIS TYPE, UNSPECIFIED SITE: ICD-10-CM

## 2023-09-15 DIAGNOSIS — J44.9 CHRONIC OBSTRUCTIVE PULMONARY DISEASE, UNSPECIFIED COPD TYPE (MULTI): ICD-10-CM

## 2023-09-15 PROCEDURE — 99490 CHRNC CARE MGMT STAFF 1ST 20: CPT | Performed by: FAMILY MEDICINE

## 2023-09-15 NOTE — PROGRESS NOTES
RN CM outreach call placed and spoke with Minoo.  She states that she is feeling much better since our last outreach.  She was having some arthritis pain and was ordered prednisone by Dr. Aguirre last month, which was very effective.    She reports that she does have occasional SOB but has been trying to increase her activity by walking her dog once or twice daily.  She has also been using her Advair twice daily.  She occasionally monitors blood pressure at home and last reading she had was 135/80.  She denies chest pain, pressure, or dizziness.    She has been watching her diet and cut back on sweets to attempt to keep A1c WNL.  Reviewed appt scheduled with Dr. Aguirre on 10/18 and denies need for Rx refills at this time.

## 2023-09-21 DIAGNOSIS — M54.50 CHRONIC LOW BACK PAIN WITHOUT SCIATICA, UNSPECIFIED BACK PAIN LATERALITY: ICD-10-CM

## 2023-09-21 DIAGNOSIS — G89.29 CHRONIC LOW BACK PAIN WITHOUT SCIATICA, UNSPECIFIED BACK PAIN LATERALITY: ICD-10-CM

## 2023-09-21 RX ORDER — PREGABALIN 150 MG/1
150 CAPSULE ORAL 2 TIMES DAILY
Qty: 180 CAPSULE | Refills: 0 | Status: SHIPPED | OUTPATIENT
Start: 2023-09-21 | End: 2023-12-18 | Stop reason: SDUPTHER

## 2023-09-21 NOTE — PROGRESS NOTES
Chart reviewed.  RN CM call placed to Ms Aguilear.  I introduced myself and provided my contact information to patient.  She denies HTN symptoms but does not test herself at home.  She reports that she is feeling much better since her ED visit last month for hypoxia.  She reports that she has significantly decreased the number of cigarettes smoked daily.  She does not have a follow up appointment at this time and has not signed up for MyChart.  She has no appointments scheduled with Dr Aguirre as this time and declines need.  Declines need for Rx renewal at this time.  I instructed Ms Aguilera to contact me with any non-emergent health needs between our monthly calls.     Patient has had post partum depression after first pregnancy at 15. Improvement and weight loss after taking Wellbutrin. No signs of depression at this time. Continues to take Wellbutrin.

## 2023-10-03 DIAGNOSIS — K21.9 GASTROESOPHAGEAL REFLUX DISEASE WITHOUT ESOPHAGITIS: ICD-10-CM

## 2023-10-03 RX ORDER — PROMETHAZINE HYDROCHLORIDE 25 MG/1
TABLET ORAL
Qty: 30 TABLET | Refills: 0 | Status: SHIPPED | OUTPATIENT
Start: 2023-10-03 | End: 2023-11-20 | Stop reason: ALTCHOICE

## 2023-10-17 DIAGNOSIS — F32.A DEPRESSION, UNSPECIFIED DEPRESSION TYPE: ICD-10-CM

## 2023-10-17 RX ORDER — ARIPIPRAZOLE 5 MG/1
5 TABLET ORAL DAILY
Qty: 90 TABLET | Refills: 3 | Status: SHIPPED | OUTPATIENT
Start: 2023-10-17

## 2023-10-19 ENCOUNTER — PATIENT OUTREACH (OUTPATIENT)
Dept: PRIMARY CARE | Facility: CLINIC | Age: 68
End: 2023-10-19
Payer: MEDICARE

## 2023-10-19 DIAGNOSIS — J44.9 CHRONIC OBSTRUCTIVE PULMONARY DISEASE, UNSPECIFIED COPD TYPE (MULTI): ICD-10-CM

## 2023-10-19 DIAGNOSIS — I10 BENIGN ESSENTIAL HTN: ICD-10-CM

## 2023-10-19 PROCEDURE — 99490 CHRNC CARE MGMT STAFF 1ST 20: CPT | Performed by: FAMILY MEDICINE

## 2023-10-19 NOTE — PROGRESS NOTES
RN CM outreach call placed and spoke with Minoo.  Chart and medications reviewed.  She reports that she is doing good.  Her breathing seems slightly labored on the phone but she denies discomfort.  Home BP this am was 155/90 but pt denies chest pain/pressure, headache, dizziness.  When mentioned, she reports that she forgot about her appt with Dr. Aguirre yesterday and already called to reschedule.  She denies further needs.

## 2023-11-07 DIAGNOSIS — E11.42 DIABETIC PERIPHERAL NEUROPATHY (MULTI): ICD-10-CM

## 2023-11-07 RX ORDER — CELECOXIB 200 MG/1
CAPSULE ORAL
Qty: 30 CAPSULE | Refills: 3 | Status: SHIPPED | OUTPATIENT
Start: 2023-11-07 | End: 2024-03-01

## 2023-11-20 ENCOUNTER — OFFICE VISIT (OUTPATIENT)
Dept: PRIMARY CARE | Facility: CLINIC | Age: 68
End: 2023-11-20
Payer: MEDICARE

## 2023-11-20 VITALS
TEMPERATURE: 98.3 F | BODY MASS INDEX: 35.44 KG/M2 | RESPIRATION RATE: 14 BRPM | WEIGHT: 200 LBS | HEIGHT: 63 IN | HEART RATE: 80 BPM | DIASTOLIC BLOOD PRESSURE: 88 MMHG | SYSTOLIC BLOOD PRESSURE: 136 MMHG

## 2023-11-20 DIAGNOSIS — Z12.31 ENCOUNTER FOR SCREENING MAMMOGRAM FOR MALIGNANT NEOPLASM OF BREAST: ICD-10-CM

## 2023-11-20 DIAGNOSIS — Z78.0 MENOPAUSE PRESENT: ICD-10-CM

## 2023-11-20 DIAGNOSIS — Z00.00 ROUTINE GENERAL MEDICAL EXAMINATION AT HEALTH CARE FACILITY: Primary | ICD-10-CM

## 2023-11-20 DIAGNOSIS — E11.9 TYPE 2 DIABETES MELLITUS WITHOUT COMPLICATION, WITHOUT LONG-TERM CURRENT USE OF INSULIN (MULTI): ICD-10-CM

## 2023-11-20 DIAGNOSIS — E11.42 TYPE 2 DIABETES MELLITUS WITH DIABETIC POLYNEUROPATHY, WITHOUT LONG-TERM CURRENT USE OF INSULIN (MULTI): ICD-10-CM

## 2023-11-20 PROBLEM — D64.9 ANEMIA: Status: RESOLVED | Noted: 2023-04-17 | Resolved: 2023-11-20

## 2023-11-20 PROBLEM — I72.9 ANEURYSM (CMS-HCC): Status: RESOLVED | Noted: 2023-04-17 | Resolved: 2023-11-20

## 2023-11-20 LAB — POC HEMOGLOBIN A1C: 6.7 % (ref 4.2–6.5)

## 2023-11-20 PROCEDURE — 1159F MED LIST DOCD IN RCRD: CPT | Performed by: FAMILY MEDICINE

## 2023-11-20 PROCEDURE — G0439 PPPS, SUBSEQ VISIT: HCPCS | Performed by: FAMILY MEDICINE

## 2023-11-20 PROCEDURE — 3044F HG A1C LEVEL LT 7.0%: CPT | Performed by: FAMILY MEDICINE

## 2023-11-20 PROCEDURE — 1160F RVW MEDS BY RX/DR IN RCRD: CPT | Performed by: FAMILY MEDICINE

## 2023-11-20 PROCEDURE — 3075F SYST BP GE 130 - 139MM HG: CPT | Performed by: FAMILY MEDICINE

## 2023-11-20 PROCEDURE — 83036 HEMOGLOBIN GLYCOSYLATED A1C: CPT | Performed by: FAMILY MEDICINE

## 2023-11-20 PROCEDURE — 1170F FXNL STATUS ASSESSED: CPT | Performed by: FAMILY MEDICINE

## 2023-11-20 PROCEDURE — 4004F PT TOBACCO SCREEN RCVD TLK: CPT | Performed by: FAMILY MEDICINE

## 2023-11-20 PROCEDURE — 4010F ACE/ARB THERAPY RXD/TAKEN: CPT | Performed by: FAMILY MEDICINE

## 2023-11-20 PROCEDURE — 3079F DIAST BP 80-89 MM HG: CPT | Performed by: FAMILY MEDICINE

## 2023-11-20 ASSESSMENT — ACTIVITIES OF DAILY LIVING (ADL)
TAKING_MEDICATION: INDEPENDENT
DOING_HOUSEWORK: INDEPENDENT
DRESSING: INDEPENDENT
MANAGING_FINANCES: INDEPENDENT
GROCERY_SHOPPING: INDEPENDENT
BATHING: INDEPENDENT

## 2023-11-20 ASSESSMENT — PATIENT HEALTH QUESTIONNAIRE - PHQ9
1. LITTLE INTEREST OR PLEASURE IN DOING THINGS: NOT AT ALL
2. FEELING DOWN, DEPRESSED OR HOPELESS: NOT AT ALL
SUM OF ALL RESPONSES TO PHQ9 QUESTIONS 1 AND 2: 0

## 2023-11-20 NOTE — PROGRESS NOTES
"Subjective   Reason for Visit: Minoo Aguilera is an 68 y.o. female here for a Medicare Wellness visit.     Past Medical, Surgical, and Family History reviewed and updated in chart.    Reviewed all medications by prescribing practitioner or clinical pharmacist (such as prescriptions, OTCs, herbal therapies and supplements) and documented in the medical record.    HPI    Patient Care Team:  Jf Aguirre MD as PCP - General  Jf Aguirre MD as PCP - [facility]ributed [facility]  Marybeth Mchugh RN as Care Manager (Case Management)     Review of Systems    Objective   Vitals:  /88   Pulse 80   Temp 36.8 °C (98.3 °F)   Resp 14   Ht 1.6 m (5' 3\")   Wt 90.7 kg (200 lb)   BMI 35.43 kg/m²       Physical Exam  Constitutional:       Appearance: Normal appearance.   HENT:      Head: Normocephalic.   Eyes:      Conjunctiva/sclera: Conjunctivae normal.   Cardiovascular:      Rate and Rhythm: Normal rate and regular rhythm.   Pulmonary:      Effort: Pulmonary effort is normal.      Breath sounds: Normal breath sounds.   Musculoskeletal:      Cervical back: Neck supple.   Skin:     General: Skin is warm and dry.   Neurological:      Mental Status: She is alert.         Assessment/Plan   Problem List Items Addressed This Visit       Type 2 diabetes mellitus with diabetic polyneuropathy, without long-term current use of insulin (CMS/[facility])    Current Assessment & Plan     She is describing some diabetic neuropathy with burning and pain in her legs happening in the afternoon and evening.  She is already on a relatively high dose of Lyrica and her diabetes is currently under well control.  I recommend that she move her second dose up to more like suppertime to get give it a better match to when her symptoms are occurring          Other Visit Diagnoses       Routine general medical examination at health care facility    -  Primary    Relevant Orders    1 Year Follow Up In [facility] - PCP - Wellness Exam    " Encounter for screening mammogram for malignant neoplasm of breast        Relevant Orders    BI mammo bilateral screening tomosynthesis    Menopause present        Relevant Orders    XR DEXA bone density

## 2023-11-20 NOTE — ASSESSMENT & PLAN NOTE
She is describing some diabetic neuropathy with burning and pain in her legs happening in the afternoon and evening.  She is already on a relatively high dose of Lyrica and her diabetes is currently under well control.  I recommend that she move her second dose up to more like suppertime to get give it a better match to when her symptoms are occurring

## 2023-11-21 DIAGNOSIS — I10 BENIGN ESSENTIAL HTN: ICD-10-CM

## 2023-11-21 RX ORDER — METOPROLOL SUCCINATE 50 MG/1
50 TABLET, EXTENDED RELEASE ORAL DAILY
Qty: 90 TABLET | Refills: 0 | Status: SHIPPED | OUTPATIENT
Start: 2023-11-21 | End: 2024-02-22

## 2023-11-24 ENCOUNTER — PATIENT OUTREACH (OUTPATIENT)
Dept: PRIMARY CARE | Facility: CLINIC | Age: 68
End: 2023-11-24
Payer: MEDICARE

## 2023-11-29 DIAGNOSIS — K21.9 GASTROESOPHAGEAL REFLUX DISEASE WITHOUT ESOPHAGITIS: ICD-10-CM

## 2023-11-29 RX ORDER — PROMETHAZINE HYDROCHLORIDE 25 MG/1
TABLET ORAL
Qty: 30 TABLET | Refills: 0 | OUTPATIENT
Start: 2023-11-29

## 2023-12-06 ENCOUNTER — HOSPITAL ENCOUNTER (OUTPATIENT)
Dept: RADIOLOGY | Facility: EXTERNAL LOCATION | Age: 68
Discharge: HOME | End: 2023-12-06

## 2023-12-06 ENCOUNTER — HOSPITAL ENCOUNTER (OUTPATIENT)
Dept: RADIOLOGY | Facility: HOSPITAL | Age: 68
Discharge: HOME | End: 2023-12-06
Payer: MEDICARE

## 2023-12-06 DIAGNOSIS — Z12.31 ENCOUNTER FOR SCREENING MAMMOGRAM FOR MALIGNANT NEOPLASM OF BREAST: ICD-10-CM

## 2023-12-06 PROCEDURE — 77063 BREAST TOMOSYNTHESIS BI: CPT | Performed by: RADIOLOGY

## 2023-12-06 PROCEDURE — 77063 BREAST TOMOSYNTHESIS BI: CPT

## 2023-12-06 PROCEDURE — 77067 SCR MAMMO BI INCL CAD: CPT | Performed by: RADIOLOGY

## 2023-12-07 ENCOUNTER — PATIENT OUTREACH (OUTPATIENT)
Dept: PRIMARY CARE | Facility: CLINIC | Age: 68
End: 2023-12-07
Payer: MEDICARE

## 2023-12-07 DIAGNOSIS — J44.9 CHRONIC OBSTRUCTIVE PULMONARY DISEASE, UNSPECIFIED COPD TYPE (MULTI): ICD-10-CM

## 2023-12-07 DIAGNOSIS — I10 BENIGN ESSENTIAL HTN: ICD-10-CM

## 2023-12-07 PROCEDURE — 99490 CHRNC CARE MGMT STAFF 1ST 20: CPT | Performed by: FAMILY MEDICINE

## 2023-12-07 NOTE — RESULT ENCOUNTER NOTE
I tried to call the patient about mammogram results but there was no answer and there is no voicemail

## 2023-12-07 NOTE — PROGRESS NOTES
Monthly CCM outreach call placed and spoke with Minoo.  She reports that she is doing well.  No complaints or complications to report. She had a mammogram done on 12/6 and Dexa bond density scan scheduled for 12/15.  She denies difficulty breathing, chest pain, pressure, headaches, or falls.  Denies adverse effects or problems with medications.  She was in to see Dr. Aguirre on 11/20/23 and they discussed medication time adjustment for neuropathy symptoms.  No further needs at this time.

## 2023-12-08 DIAGNOSIS — R92.8 ABNORMAL MAMMOGRAM: Primary | ICD-10-CM

## 2023-12-08 NOTE — RESULT ENCOUNTER NOTE
Please schedule mammogram and breast ultrasound for this patient.    I called Minoo and explained the results of her mammogram showing an asymmetry on the right.  I recommend as does the radiologist that she get a compression film and ultrasound of that side.  We will get this scheduled

## 2023-12-13 ENCOUNTER — TELEPHONE (OUTPATIENT)
Dept: PRIMARY CARE | Facility: CLINIC | Age: 68
End: 2023-12-13

## 2023-12-13 ENCOUNTER — OFFICE VISIT (OUTPATIENT)
Dept: PRIMARY CARE | Facility: CLINIC | Age: 68
End: 2023-12-13
Payer: MEDICARE

## 2023-12-13 VITALS
HEIGHT: 63 IN | RESPIRATION RATE: 16 BRPM | WEIGHT: 200 LBS | DIASTOLIC BLOOD PRESSURE: 102 MMHG | BODY MASS INDEX: 35.44 KG/M2 | TEMPERATURE: 98.3 F | HEART RATE: 78 BPM | SYSTOLIC BLOOD PRESSURE: 159 MMHG

## 2023-12-13 DIAGNOSIS — R30.0 DYSURIA: ICD-10-CM

## 2023-12-13 LAB
POC APPEARANCE, URINE: ABNORMAL
POC BILIRUBIN, URINE: NEGATIVE
POC BLOOD, URINE: ABNORMAL
POC COLOR, URINE: YELLOW
POC GLUCOSE, URINE: NEGATIVE MG/DL
POC KETONES, URINE: NEGATIVE MG/DL
POC LEUKOCYTES, URINE: NEGATIVE
POC NITRITE,URINE: NEGATIVE
POC PH, URINE: 6 PH
POC PROTEIN, URINE: ABNORMAL MG/DL
POC SPECIFIC GRAVITY, URINE: 1.02
POC UROBILINOGEN, URINE: 0.2 EU/DL

## 2023-12-13 PROCEDURE — 4004F PT TOBACCO SCREEN RCVD TLK: CPT | Performed by: FAMILY MEDICINE

## 2023-12-13 PROCEDURE — 99213 OFFICE O/P EST LOW 20 MIN: CPT | Performed by: FAMILY MEDICINE

## 2023-12-13 PROCEDURE — 3077F SYST BP >= 140 MM HG: CPT | Performed by: FAMILY MEDICINE

## 2023-12-13 PROCEDURE — 1160F RVW MEDS BY RX/DR IN RCRD: CPT | Performed by: FAMILY MEDICINE

## 2023-12-13 PROCEDURE — 3044F HG A1C LEVEL LT 7.0%: CPT | Performed by: FAMILY MEDICINE

## 2023-12-13 PROCEDURE — 1159F MED LIST DOCD IN RCRD: CPT | Performed by: FAMILY MEDICINE

## 2023-12-13 PROCEDURE — 3080F DIAST BP >= 90 MM HG: CPT | Performed by: FAMILY MEDICINE

## 2023-12-13 PROCEDURE — 81003 URINALYSIS AUTO W/O SCOPE: CPT | Performed by: FAMILY MEDICINE

## 2023-12-13 PROCEDURE — 4010F ACE/ARB THERAPY RXD/TAKEN: CPT | Performed by: FAMILY MEDICINE

## 2023-12-13 PROCEDURE — 87086 URINE CULTURE/COLONY COUNT: CPT

## 2023-12-13 RX ORDER — NITROFURANTOIN 25; 75 MG/1; MG/1
100 CAPSULE ORAL 2 TIMES DAILY
Qty: 10 CAPSULE | Refills: 0 | Status: SHIPPED | OUTPATIENT
Start: 2023-12-13 | End: 2023-12-18

## 2023-12-13 NOTE — PROGRESS NOTES
Subjective   Minoo Aguilera is a 68 y.o. female who presents for UTI.  UTI   This is a recurrent problem. The current episode started yesterday. The problem has been unchanged. Associated symptoms comments: Dark colored, malodorous urine and bladder pressure. Her past medical history is significant for recurrent UTIs.   She is describing a heavy pressure sensation in her suprapubic area that is much worse when urinating.  She is noticing dark discoloration to her urine but has no burning  Visit Vitals  BP (!) 159/102   Pulse 78   Temp 36.8 °C (98.3 °F)   Resp 16      Objective   Physical Exam  Constitutional:       Appearance: Normal appearance.   HENT:      Head: Normocephalic.   Eyes:      Conjunctiva/sclera: Conjunctivae normal.   Cardiovascular:      Rate and Rhythm: Normal rate and regular rhythm.   Pulmonary:      Effort: Pulmonary effort is normal.      Breath sounds: Normal breath sounds.   Abdominal:      Tenderness: There is no right CVA tenderness or left CVA tenderness.   Musculoskeletal:      Cervical back: Neck supple.   Skin:     General: Skin is warm and dry.   Neurological:      Mental Status: She is alert.         Assessment/Plan    Problem List Items Addressed This Visit    None  Visit Diagnoses       Dysuria        Relevant Orders    POCT UA Automated manually resulted    Urine Culture        Her symptoms are consistent with urinary Tract infection.  There is no CVA tenderness or fever or anything to suggest pyelonephritis or nephrolithiasis.  Because she is older and higher risk for more serious complications I think empiric treatment with Macrobid while awaiting the results of the urine culture is reasonable.

## 2023-12-13 NOTE — TELEPHONE ENCOUNTER
Pt called, states she is having symptoms of a UTI and requesting antibiotics right away. Do you want to see her or just have her drop off a urine sample?

## 2023-12-15 ENCOUNTER — HOSPITAL ENCOUNTER (OUTPATIENT)
Dept: RADIOLOGY | Facility: HOSPITAL | Age: 68
Discharge: HOME | End: 2023-12-15
Payer: MEDICARE

## 2023-12-15 DIAGNOSIS — Z78.0 MENOPAUSE PRESENT: ICD-10-CM

## 2023-12-15 LAB — BACTERIA UR CULT: NORMAL

## 2023-12-15 PROCEDURE — 77080 DXA BONE DENSITY AXIAL: CPT | Performed by: RADIOLOGY

## 2023-12-15 PROCEDURE — 77080 DXA BONE DENSITY AXIAL: CPT

## 2023-12-15 NOTE — RESULT ENCOUNTER NOTE
Please call Minoo  regarding lab results and tell her  : The bone density test is normal and does not show osteoporosis

## 2023-12-18 ENCOUNTER — APPOINTMENT (OUTPATIENT)
Dept: RADIOLOGY | Facility: HOSPITAL | Age: 68
End: 2023-12-18
Payer: MEDICARE

## 2023-12-18 DIAGNOSIS — G89.29 CHRONIC LOW BACK PAIN WITHOUT SCIATICA, UNSPECIFIED BACK PAIN LATERALITY: ICD-10-CM

## 2023-12-18 DIAGNOSIS — M54.50 CHRONIC LOW BACK PAIN WITHOUT SCIATICA, UNSPECIFIED BACK PAIN LATERALITY: ICD-10-CM

## 2023-12-19 RX ORDER — PREGABALIN 150 MG/1
150 CAPSULE ORAL 2 TIMES DAILY
Qty: 180 CAPSULE | Refills: 0 | Status: SHIPPED | OUTPATIENT
Start: 2023-12-19 | End: 2024-02-05 | Stop reason: SDUPTHER

## 2023-12-30 ENCOUNTER — HOSPITAL ENCOUNTER (EMERGENCY)
Age: 68
Discharge: HOME | End: 2023-12-30
Attending: EMERGENCY MEDICINE
Payer: MEDICARE

## 2023-12-30 ENCOUNTER — APPOINTMENT (OUTPATIENT)
Dept: GENERAL RADIOLOGY | Age: 68
End: 2023-12-30
Payer: MEDICARE

## 2023-12-30 VITALS
BODY MASS INDEX: 33.32 KG/M2 | HEIGHT: 65 IN | SYSTOLIC BLOOD PRESSURE: 169 MMHG | HEART RATE: 77 BPM | RESPIRATION RATE: 16 BRPM | WEIGHT: 200 LBS | OXYGEN SATURATION: 96 % | DIASTOLIC BLOOD PRESSURE: 99 MMHG | TEMPERATURE: 98.3 F

## 2023-12-30 DIAGNOSIS — S92.354A CLOSED NONDISPLACED FRACTURE OF FIFTH METATARSAL BONE OF RIGHT FOOT, INITIAL ENCOUNTER: ICD-10-CM

## 2023-12-30 DIAGNOSIS — M79.671 RIGHT FOOT PAIN: Primary | ICD-10-CM

## 2023-12-30 PROCEDURE — 99283 EMERGENCY DEPT VISIT LOW MDM: CPT

## 2023-12-30 PROCEDURE — 73630 X-RAY EXAM OF FOOT: CPT

## 2023-12-30 PROCEDURE — 6360000002 HC RX W HCPCS: Performed by: EMERGENCY MEDICINE

## 2023-12-30 PROCEDURE — 6370000000 HC RX 637 (ALT 250 FOR IP): Performed by: EMERGENCY MEDICINE

## 2023-12-30 RX ORDER — DEXAMETHASONE 4 MG/1
8 TABLET ORAL ONCE
Status: COMPLETED | OUTPATIENT
Start: 2023-12-30 | End: 2023-12-30

## 2023-12-30 RX ORDER — IBUPROFEN 400 MG/1
800 TABLET ORAL ONCE
Status: COMPLETED | OUTPATIENT
Start: 2023-12-30 | End: 2023-12-30

## 2023-12-30 RX ORDER — PREDNISONE 20 MG/1
20 TABLET ORAL DAILY
Qty: 5 TABLET | Refills: 0 | Status: SHIPPED | OUTPATIENT
Start: 2023-12-30 | End: 2023-12-30

## 2023-12-30 RX ORDER — NAPROXEN 500 MG/1
500 TABLET ORAL 2 TIMES DAILY
Qty: 20 TABLET | Refills: 0 | Status: SHIPPED | OUTPATIENT
Start: 2023-12-30 | End: 2023-12-30

## 2023-12-30 RX ORDER — NAPROXEN 500 MG/1
500 TABLET ORAL 2 TIMES DAILY
Qty: 20 TABLET | Refills: 0 | Status: SHIPPED | OUTPATIENT
Start: 2023-12-30 | End: 2024-01-09

## 2023-12-30 RX ORDER — HYDROCODONE BITARTRATE AND ACETAMINOPHEN 5; 325 MG/1; MG/1
1 TABLET ORAL EVERY 6 HOURS PRN
Qty: 10 TABLET | Refills: 0 | Status: SHIPPED | OUTPATIENT
Start: 2023-12-30 | End: 2024-01-02

## 2023-12-30 RX ADMIN — DEXAMETHASONE 8 MG: 4 TABLET ORAL at 09:52

## 2023-12-30 RX ADMIN — IBUPROFEN 800 MG: 400 TABLET, FILM COATED ORAL at 09:52

## 2023-12-30 ASSESSMENT — PAIN DESCRIPTION - PAIN TYPE
TYPE: ACUTE PAIN
TYPE: ACUTE PAIN

## 2023-12-30 ASSESSMENT — PAIN DESCRIPTION - DESCRIPTORS
DESCRIPTORS: JABBING;TIGHTNESS
DESCRIPTORS: SHARP

## 2023-12-30 ASSESSMENT — PAIN DESCRIPTION - FREQUENCY
FREQUENCY: INTERMITTENT
FREQUENCY: CONTINUOUS

## 2023-12-30 ASSESSMENT — PAIN SCALES - GENERAL
PAINLEVEL_OUTOF10: 9
PAINLEVEL_OUTOF10: 6

## 2023-12-30 ASSESSMENT — PAIN DESCRIPTION - ONSET
ONSET: SUDDEN
ONSET: ON-GOING

## 2023-12-30 ASSESSMENT — PAIN DESCRIPTION - LOCATION
LOCATION: FOOT
LOCATION: FOOT

## 2023-12-30 ASSESSMENT — LIFESTYLE VARIABLES
HOW OFTEN DO YOU HAVE A DRINK CONTAINING ALCOHOL: NEVER
HOW MANY STANDARD DRINKS CONTAINING ALCOHOL DO YOU HAVE ON A TYPICAL DAY: PATIENT DOES NOT DRINK

## 2023-12-30 ASSESSMENT — PAIN DESCRIPTION - ORIENTATION
ORIENTATION: RIGHT;LEFT
ORIENTATION: RIGHT

## 2023-12-30 ASSESSMENT — PAIN - FUNCTIONAL ASSESSMENT
PAIN_FUNCTIONAL_ASSESSMENT: PREVENTS OR INTERFERES WITH MANY ACTIVE NOT PASSIVE ACTIVITIES
PAIN_FUNCTIONAL_ASSESSMENT: 0-10
PAIN_FUNCTIONAL_ASSESSMENT: 0-10

## 2023-12-30 NOTE — ED PROVIDER NOTES
[address] ED  eMERGENCY dEPARTMENT eNCOUnter      Pt Name: Tito Khoury  MRN: [id number]  [address] [facility] [place] 1955  Date of evaluation: 12/30/2023  Provider: Robert Farias MD    [address]       Chief Complaint   Patient presents with    Foot Pain     Foot pain and swelling in right foot x4 days. No known injury. Left foot starting to swelling          HISTORY OF PRESENT ILLNESS   (Location/Symptom, Timing/Onset,Context/Setting, Quality, Duration, Modifying Factors, Severity)  Note limiting factors. Tito Khoury is a 77 y.o. female who presents to the emergency department patient comes to the emergency because of ongoing issues with her right foot pain for the past several weeks last 4 days time increasing pain throbbing and has been slightly swollen as per patient worse with ambulation or weightbearing neuro regarding injury fall fracture of previous surgery no history of gout no history diabetes mellitus history of asthma obesity migraine headache remote TIA and thyroid disease renal pain is 7-8 out of 10    HPI    NursingNotes were reviewed. REVIEW OF SYSTEMS    (2-9 systems for level 4, 10 or more for level 5)     Review of Systems   Constitutional: Negative. Negative for activity change and fever. HENT:  Negative for congestion, drooling, facial swelling, mouth sores, nosebleeds, sinus pressure, sore throat, trouble swallowing and voice change. Eyes:  Negative for pain, discharge, redness and visual disturbance. Respiratory:  Negative for cough, choking, chest tightness, shortness of breath, wheezing and stridor. Cardiovascular:  Negative for chest pain, palpitations and leg swelling. Gastrointestinal:  Negative for abdominal pain, blood in stool, constipation, diarrhea and vomiting. Endocrine: Negative for cold intolerance, polyphagia and polyuria. Genitourinary:  Negative for dysuria, flank pain, frequency, genital sores and urgency.    Musculoskeletal:  Positive for arthralgias and swelling. Negative for back pain, neck pain and neck stiffness.   Skin:  Negative for pallor and rash.   Neurological:  Negative for tremors, seizures, syncope, weakness, numbness and headaches.   Hematological:  Negative for adenopathy. Does not bruise/bleed easily.   Psychiatric/Behavioral:  Negative for agitation, behavioral problems, hallucinations and sleep disturbance. The patient is nervous/anxious. The patient is not hyperactive.    All other systems reviewed and are negative.      Except as noted above the remainder of the review of systems was reviewed and negative.       PAST MEDICAL HISTORY     Past Medical History:   Diagnosis Date    Asthma     Cerebral artery occlusion with cerebral infarction (HCC)     8/2021    Depression     Migraine     Thyroid disease     hypothyroid         SURGICALHISTORY       Past Surgical History:   Procedure Laterality Date    HYSTERECTOMY (CERVIX STATUS UNKNOWN)      KNEE SURGERY      left knee total replacement         CURRENT MEDICATIONS       Discharge Medication List as of 12/30/2023 10:11 AM        CONTINUE these medications which have NOT CHANGED    Details   nicotine (NICODERM CQ) 14 MG/24HR Place 1 patch onto the skin daily, Disp-14 patch, R-0Normal      nicotine (NICODERM CQ) 7 MG/24HR Place 1 patch onto the skin every 24 hours Start taking this 7 mg low dose patch after you complete 14 days of medium dose patch 14mg daily, Disp-30 patch, R-0Normal      guaiFENesin (MUCINEX) 600 MG extended release tablet Take 1 tablet by mouth 3 times daily as needed for Congestion, Disp-21 tablet, R-0Normal      albuterol sulfate HFA (VENTOLIN HFA) 108 (90 Base) MCG/ACT inhaler Inhale 2 puffs into the lungs 4 times daily as needed for Wheezing, Disp-54 g, R-1Normal      LORazepam (ATIVAN) 1 MG tablet Take 0.5 tablets by mouth every 8 hours as needed for Anxiety.Historical Med      pregabalin (LYRICA) 150 MG capsule Take 1 capsule by mouth daily., Disp-60 capsule, R-3Historical

## 2023-12-31 DIAGNOSIS — F32.A DEPRESSION, UNSPECIFIED DEPRESSION TYPE: Primary | ICD-10-CM

## 2024-01-01 ENCOUNTER — APPOINTMENT (OUTPATIENT)
Dept: RADIOLOGY | Facility: HOSPITAL | Age: 69
End: 2024-01-01
Payer: MEDICARE

## 2024-01-02 ENCOUNTER — PATIENT OUTREACH (OUTPATIENT)
Dept: PRIMARY CARE | Facility: CLINIC | Age: 69
End: 2024-01-02
Payer: MEDICARE

## 2024-01-02 DIAGNOSIS — J44.9 CHRONIC OBSTRUCTIVE PULMONARY DISEASE, UNSPECIFIED COPD TYPE (MULTI): ICD-10-CM

## 2024-01-02 DIAGNOSIS — S92.354A CLOSED NONDISPLACED FRACTURE OF FIFTH METATARSAL BONE OF RIGHT FOOT, INITIAL ENCOUNTER: ICD-10-CM

## 2024-01-02 DIAGNOSIS — I10 BENIGN ESSENTIAL HTN: ICD-10-CM

## 2024-01-02 PROCEDURE — 99490 CHRNC CARE MGMT STAFF 1ST 20: CPT | Performed by: FAMILY MEDICINE

## 2024-01-02 RX ORDER — VENLAFAXINE HYDROCHLORIDE 150 MG/1
150 CAPSULE, EXTENDED RELEASE ORAL
Qty: 90 CAPSULE | Refills: 0 | Status: SHIPPED | OUTPATIENT
Start: 2024-01-02 | End: 2024-03-29

## 2024-01-02 NOTE — PROGRESS NOTES
Chart reviewed and [facility] monthly outreach call placed.  I spoke with Minoo.  She was seen in [facility] ED on 12/30/23 for right foot pain.  X-ray shows a nondisplaced fracture of the 5th metatarsal bone.  She was placed into a immobilizing boot and Rx for Norco.  She has a follow-up appointment with Dr. Villegas, podiatry, today.    She denies chest pain, dizziness, or falls.  She denies difficulty breathing.

## 2024-01-05 ENCOUNTER — HOSPITAL ENCOUNTER (OUTPATIENT)
Dept: RADIOLOGY | Facility: HOSPITAL | Age: 69
Discharge: HOME | End: 2024-01-05
Payer: MEDICARE

## 2024-01-05 DIAGNOSIS — R92.8 ABNORMAL MAMMOGRAM: ICD-10-CM

## 2024-01-05 PROCEDURE — 76642 ULTRASOUND BREAST LIMITED: CPT | Mod: RIGHT SIDE | Performed by: RADIOLOGY

## 2024-01-05 PROCEDURE — 77065 DX MAMMO INCL CAD UNI: CPT | Mod: RIGHT SIDE | Performed by: RADIOLOGY

## 2024-01-05 PROCEDURE — G0279 TOMOSYNTHESIS, MAMMO: HCPCS | Mod: RIGHT SIDE | Performed by: RADIOLOGY

## 2024-01-05 PROCEDURE — 77061 BREAST TOMOSYNTHESIS UNI: CPT | Mod: RT

## 2024-01-05 PROCEDURE — 76642 ULTRASOUND BREAST LIMITED: CPT | Mod: RT

## 2024-01-05 NOTE — RESULT ENCOUNTER NOTE
Please let Minoo Aguilera know her mammogram was normal.  We recommend repeating this screen in 1 year.

## 2024-01-05 NOTE — LETTER
January 5, 2024    Minoo Aguilera  [address] [place] [address]      Dear Ms. Aguilera    Re: Your breast imaging performed on: 1/5/24  Interpreted by: Andrea Knight MD  Report sent to: Jf Aguirre MD    We are pleased to inform you that the results of your recent breast imaging exam(s) show no signs of breast cancer.    Your breast images and report will be kept on file here as part of your permanent medical record and are available for your continuing care. It is your responsibility to inform any new physician or other mammography center of your medical record with us.    Even though mammograms are the best method we have for early detection, not all cancers are found with mammograms.  If you feel a lump or have any other reasons for concern, you should tell your health care provider.    Thank you for allowing us to help in meeting your health care needs. We hope that you will contact us at the phone number listed below when it is time to schedule your next mammogram.    Sincerely,    [facility]  [phone]

## 2024-01-24 NOTE — PROGRESS NOTES
Discharge facility: [facility]  Discharge diagnosis:Encephalopathy d/t UTI with Sepsis, New DM II/Hypothyroidism  Admission date: 6/6/2023  Discharge date: 6/11/2023    PCP Appointment Date:6/14/2023  Specialist Appointment Date: not yet scheduled.  Pt would like to discuss w/PCP  Hospital Encounter and Summary: Linked   See Discharge assessment below for further details    Medications  Medications reviewed with patient/caregiver?: Yes (6/12/2023 11:11 AM)  Is the patient having any side effects they believe may be caused by any medication additions or changes?: No (6/12/2023 11:11 AM)  Does the patient have all medications ordered at discharge?: Yes (6/12/2023 11:11 AM)  Care Management Interventions: Provided patient education; Advised patient to call provider (6/12/2023 11:11 AM)  Is the patient taking all medications as directed (includes completed medication regime)?: Yes (6/12/2023 11:11 AM)    Appointments  Does the patient have a primary care provider?: Yes (6/12/2023 11:11 AM)  Care Management Interventions: Verified appointment date/time/provider; Educated patient on importance of making appointment (6/12/2023 11:11 AM)  Has the patient kept scheduled appointments due by today?: Not applicable (6/12/2023 11:11 AM)    Self Management  What is the home health agency?: [facility] (6/12/2023 11:11 AM)  Has home health visited the patient within 72 hours of discharge?: Not applicable (6/12/2023 11:11 AM)  Follow Up Tasks: Home Health (6/12/2023 11:11 AM)    Patient Teaching  Does the patient have access to their discharge instructions?: Yes (6/12/2023 11:11 AM)  Care Management Interventions: Reviewed instructions with patient (6/12/2023 11:11 AM)  What is the patient's perception of their health status since discharge?: Improving (6/12/2023 11:11 AM)  If the patient is a current smoker, are they able to teach back resources for cessation?: Smoking cessation medications  (6/12/2023 11:11 AM)    Wrap Up  Would the patient like more information on Advance Care Planning?: Yes (6/12/2023 11:11 AM)  Call End Time: 1018 (6/12/2023 11:11 AM)         Price (Do Not Change): 0.00 Instructions: This plan will send the code FBSE to the PM system.  DO NOT or CHANGE the price. Detail Level: Simple

## 2024-01-29 ENCOUNTER — TELEPHONE (OUTPATIENT)
Dept: PRIMARY CARE | Facility: CLINIC | Age: 69
End: 2024-01-29
Payer: MEDICARE

## 2024-01-29 NOTE — TELEPHONE ENCOUNTER
"Pt called requesting information on how to \"donate her body to a hospital\" after she dies. She does not want to be buried or cremated and wants to know more about this process. Please advise.   "

## 2024-02-05 ENCOUNTER — TELEPHONE (OUTPATIENT)
Dept: PRIMARY CARE | Facility: CLINIC | Age: 69
End: 2024-02-05

## 2024-02-05 ENCOUNTER — TELEMEDICINE (OUTPATIENT)
Dept: PRIMARY CARE | Facility: CLINIC | Age: 69
End: 2024-02-05
Payer: MEDICARE

## 2024-02-05 DIAGNOSIS — I11.0 HYPERTENSIVE HEART DISEASE WITH HEART FAILURE (MULTI): ICD-10-CM

## 2024-02-05 DIAGNOSIS — G89.29 CHRONIC LOW BACK PAIN WITHOUT SCIATICA, UNSPECIFIED BACK PAIN LATERALITY: ICD-10-CM

## 2024-02-05 DIAGNOSIS — M54.50 CHRONIC LOW BACK PAIN WITHOUT SCIATICA, UNSPECIFIED BACK PAIN LATERALITY: ICD-10-CM

## 2024-02-05 DIAGNOSIS — E66.01 OBESITY, MORBID (MULTI): ICD-10-CM

## 2024-02-05 DIAGNOSIS — J41.0 SIMPLE CHRONIC BRONCHITIS (MULTI): ICD-10-CM

## 2024-02-05 DIAGNOSIS — E11.42 TYPE 2 DIABETES MELLITUS WITH DIABETIC POLYNEUROPATHY, WITHOUT LONG-TERM CURRENT USE OF INSULIN (MULTI): ICD-10-CM

## 2024-02-05 DIAGNOSIS — M06.9 RHEUMATOID ARTHRITIS INVOLVING MULTIPLE SITES, UNSPECIFIED WHETHER RHEUMATOID FACTOR PRESENT (MULTI): Primary | ICD-10-CM

## 2024-02-05 PROCEDURE — 99442 PR PHYS/QHP TELEPHONE EVALUATION 11-20 MIN: CPT | Performed by: FAMILY MEDICINE

## 2024-02-05 RX ORDER — PREGABALIN 200 MG/1
200 CAPSULE ORAL 2 TIMES DAILY
Qty: 180 CAPSULE | Refills: 0 | Status: SHIPPED | OUTPATIENT
Start: 2024-02-05 | End: 2024-05-06 | Stop reason: SDUPTHER

## 2024-02-05 NOTE — TELEPHONE ENCOUNTER
Pt called stating that her neuropathy is worsening and she would like something for it. Do you want us to add her to the schedule for today?

## 2024-02-05 NOTE — PROGRESS NOTES
Virtual or Telephone Consent    A telephone visit (audio only) between the patient (at the originating site) and the provider (at the distant site) was utilized to provide this telehealth service.     Subjective   Minoo Aguilera is a 68 y.o. female who presents for Neuro Problem.  Neuro Problem  This is a chronic problem. The problem has been gradually worsening (neuropathy in hands and feet, also reports a fractured right foot over the past 6 weeks). Treatments tried: Celebrex and Lyrica. The treatment provided moderate relief.      Her feet feel hot and swollen bilaterally coming up to the ankle only. Also tingling and loss sensation.  This is worsening.     There were no vitals taken for this visit.   Objective   Physical Exam    Assessment/Plan    Problem List Items Addressed This Visit       COPD (chronic obstructive pulmonary disease) (CMS/[facility])    Obesity, morbid (CMS/[facility])    Type 2 diabetes mellitus with diabetic polyneuropathy, without long-term current use of insulin (CMS/[facility])     The stocking distribution of burning and numbness in her feet is very consistent with peripheral diabetic neuropathy that has been worsening over time despite the use of both Celebrex and Lyrica.  We discussed options and I would like to proceed with a up titration of Lyrica.  We will increase her current dose from 150 mg twice daily to 200 mg twice daily.  I warned her regarding the possibility of sedation or grogginess as a side effect.  We also discussed the fact that we can continue this titration up based on symptom control.         Rheumatoid arthritis involving multiple sites, unspecified whether rheumatoid factor present (CMS/[facility]) - Primary    Hypertensive heart disease with heart failure (CMS/[facility])     Other Visit Diagnoses       Chronic low back pain without sciatica, unspecified back pain laterality               Verbal consent was requested and obtained from Minoo Aguilear on this date, 02/05/24 for a telehealth visit.   Approximately 15 minutes spent on the phone and during chart review and prescribing medication for this patient today.

## 2024-02-05 NOTE — ASSESSMENT & PLAN NOTE
The stocking distribution of burning and numbness in her feet is very consistent with peripheral diabetic neuropathy that has been worsening over time despite the use of both Celebrex and Lyrica.  We discussed options and I would like to proceed with a up titration of Lyrica.  We will increase her current dose from 150 mg twice daily to 200 mg twice daily.  I warned her regarding the possibility of sedation or grogginess as a side effect.  We also discussed the fact that we can continue this titration up based on symptom control.

## 2024-02-07 ENCOUNTER — PATIENT OUTREACH (OUTPATIENT)
Dept: PRIMARY CARE | Facility: CLINIC | Age: 69
End: 2024-02-07
Payer: MEDICARE

## 2024-02-07 DIAGNOSIS — I11.0 HYPERTENSIVE HEART DISEASE WITH HEART FAILURE (MULTI): ICD-10-CM

## 2024-02-07 DIAGNOSIS — J44.9 CHRONIC OBSTRUCTIVE PULMONARY DISEASE, UNSPECIFIED COPD TYPE (MULTI): ICD-10-CM

## 2024-02-07 DIAGNOSIS — G62.9 NEUROPATHY: ICD-10-CM

## 2024-02-07 PROCEDURE — 99490 CHRNC CARE MGMT STAFF 1ST 20: CPT | Performed by: FAMILY MEDICINE

## 2024-02-07 NOTE — PROGRESS NOTES
Chart reviewed and [facility] monthly outreach call placed.  I spoke with Minoo and she reports that the neuropathy in her feet is bothering her.  She had a virtual visit with Dr. Aguirre on 2/5 and he increased her lyrica dose from 150mg to 200mg but as of today, it is not yet available at her pharmacy.  She is fearful that this dose will not be effective and we discussed the side effects of increasing too suddenly.  I encouraged her to elevate her feet and look into a very light compression stocking to assist with blood flow and edema.  She denies symptoms of SOB, chest pain, dizziness.  I also encouraged her to check BP levels at home occasionally as she takes multiple medications for HTN.

## 2024-02-22 DIAGNOSIS — I10 BENIGN ESSENTIAL HTN: ICD-10-CM

## 2024-02-22 RX ORDER — METOPROLOL SUCCINATE 50 MG/1
50 TABLET, EXTENDED RELEASE ORAL DAILY
Qty: 90 TABLET | Refills: 0 | Status: SHIPPED | OUTPATIENT
Start: 2024-02-22 | End: 2024-05-23

## 2024-03-01 DIAGNOSIS — J18.9 PNEUMONIA OF LOWER LOBE DUE TO INFECTIOUS ORGANISM, UNSPECIFIED LATERALITY: ICD-10-CM

## 2024-03-01 DIAGNOSIS — E11.42 DIABETIC PERIPHERAL NEUROPATHY (MULTI): ICD-10-CM

## 2024-03-01 RX ORDER — FLUTICASONE PROPIONATE AND SALMETEROL 500; 50 UG/1; UG/1
1 POWDER RESPIRATORY (INHALATION)
Qty: 60 EACH | Refills: 11 | Status: SHIPPED | OUTPATIENT
Start: 2024-03-01 | End: 2025-03-01

## 2024-03-01 RX ORDER — ALBUTEROL SULFATE 90 UG/1
2 AEROSOL, METERED RESPIRATORY (INHALATION) EVERY 6 HOURS PRN
Qty: 18 G | Refills: 11 | Status: SHIPPED | OUTPATIENT
Start: 2024-03-01

## 2024-03-01 RX ORDER — CELECOXIB 200 MG/1
CAPSULE ORAL
Qty: 30 CAPSULE | Refills: 3 | Status: SHIPPED | OUTPATIENT
Start: 2024-03-01

## 2024-03-05 ENCOUNTER — PATIENT OUTREACH (OUTPATIENT)
Dept: PRIMARY CARE | Facility: CLINIC | Age: 69
End: 2024-03-05
Payer: MEDICARE

## 2024-03-05 DIAGNOSIS — J44.9 CHRONIC OBSTRUCTIVE PULMONARY DISEASE, UNSPECIFIED COPD TYPE (MULTI): ICD-10-CM

## 2024-03-05 DIAGNOSIS — G62.9 NEUROPATHY: ICD-10-CM

## 2024-03-05 DIAGNOSIS — I11.0 HYPERTENSIVE HEART DISEASE WITH HEART FAILURE (MULTI): ICD-10-CM

## 2024-03-05 PROCEDURE — 99490 CHRNC CARE MGMT STAFF 1ST 20: CPT | Performed by: FAMILY MEDICINE

## 2024-03-05 NOTE — PROGRESS NOTES
Chart reviewed and [facility] monthly outreach call placed.  I spoke with iMnoo and she reports that she is feeling good.  She has been trying very hard to quit smoking.  She denies difficulty breathing or SOB.  She does not check her BS at home but denies polyuria or polydipsia.  Denies chest pain, falls, dizziness.    She does continue to have pain in bilateral feet and we discussed elevation, ankle pumping, smoking cessation and BS control to prevent further neuropathy.

## 2024-03-08 ENCOUNTER — TELEPHONE (OUTPATIENT)
Dept: PRIMARY CARE | Facility: CLINIC | Age: 69
End: 2024-03-08
Payer: MEDICARE

## 2024-03-08 DIAGNOSIS — K21.9 GASTROESOPHAGEAL REFLUX DISEASE WITHOUT ESOPHAGITIS: Primary | ICD-10-CM

## 2024-03-08 RX ORDER — PROMETHAZINE HYDROCHLORIDE 25 MG/1
25 TABLET ORAL EVERY 6 HOURS PRN
Qty: 30 TABLET | Refills: 0 | Status: SHIPPED | OUTPATIENT
Start: 2024-03-08

## 2024-03-08 NOTE — TELEPHONE ENCOUNTER
Pt called requesting a refill for Phenergan but this is not active in her medication list. Please advise.

## 2024-03-13 ENCOUNTER — TELEPHONE (OUTPATIENT)
Dept: PRIMARY CARE | Facility: CLINIC | Age: 69
End: 2024-03-13
Payer: MEDICARE

## 2024-03-13 DIAGNOSIS — M06.9 RHEUMATOID ARTHRITIS INVOLVING MULTIPLE SITES, UNSPECIFIED WHETHER RHEUMATOID FACTOR PRESENT (MULTI): Primary | ICD-10-CM

## 2024-03-13 RX ORDER — PREDNISONE 50 MG/1
50 TABLET ORAL DAILY
Qty: 5 TABLET | Refills: 0 | Status: SHIPPED | OUTPATIENT
Start: 2024-03-13 | End: 2024-03-18

## 2024-03-13 NOTE — TELEPHONE ENCOUNTER
Pt called requesting a prescription for Prednisone, states she is having an arthritis flare in her back.

## 2024-03-14 DIAGNOSIS — K21.9 GASTROESOPHAGEAL REFLUX DISEASE, UNSPECIFIED WHETHER ESOPHAGITIS PRESENT: ICD-10-CM

## 2024-03-14 RX ORDER — OMEPRAZOLE 20 MG/1
20 CAPSULE, DELAYED RELEASE ORAL DAILY
Qty: 90 CAPSULE | Refills: 3 | Status: SHIPPED | OUTPATIENT
Start: 2024-03-14

## 2024-03-29 DIAGNOSIS — F32.A DEPRESSION, UNSPECIFIED DEPRESSION TYPE: ICD-10-CM

## 2024-03-29 RX ORDER — VENLAFAXINE HYDROCHLORIDE 150 MG/1
150 CAPSULE, EXTENDED RELEASE ORAL DAILY
Qty: 90 CAPSULE | Refills: 3 | Status: SHIPPED | OUTPATIENT
Start: 2024-03-29

## 2024-04-09 ENCOUNTER — TELEPHONE (OUTPATIENT)
Dept: PRIMARY CARE | Facility: CLINIC | Age: 69
End: 2024-04-09
Payer: MEDICARE

## 2024-04-09 DIAGNOSIS — M19.90 OSTEOARTHRITIS, UNSPECIFIED OSTEOARTHRITIS TYPE, UNSPECIFIED SITE: Primary | ICD-10-CM

## 2024-04-09 RX ORDER — PREDNISONE 50 MG/1
50 TABLET ORAL DAILY
Qty: 5 TABLET | Refills: 0 | Status: SHIPPED | OUTPATIENT
Start: 2024-04-09 | End: 2024-04-14

## 2024-04-09 NOTE — TELEPHONE ENCOUNTER
Pt called, states she left a message yesterday requesting Prednisone for an arthritis flare, but I do not see any documentation in her chart. Please advise.

## 2024-04-11 ENCOUNTER — PATIENT OUTREACH (OUTPATIENT)
Dept: PRIMARY CARE | Facility: CLINIC | Age: 69
End: 2024-04-11
Payer: MEDICARE

## 2024-04-11 DIAGNOSIS — J44.9 CHRONIC OBSTRUCTIVE PULMONARY DISEASE, UNSPECIFIED COPD TYPE (MULTI): ICD-10-CM

## 2024-04-11 DIAGNOSIS — M19.90 OSTEOARTHRITIS, UNSPECIFIED OSTEOARTHRITIS TYPE, UNSPECIFIED SITE: ICD-10-CM

## 2024-04-11 PROCEDURE — 99490 CHRNC CARE MGMT STAFF 1ST 20: CPT | Performed by: FAMILY MEDICINE

## 2024-04-11 NOTE — PROGRESS NOTES
Chart reviewed and [facility] monthly outreach call placed.  She reports that she is not feeling well.  Her arthritis has been really bothering her.  She contacted Dr. Montgomery office on 4/9 and was started on prednisone.  She has started taking medication but asked that we speak another day, she cut the conversation short.  I did ask if she felt that she needed to see Dr. Aguirre and she denied. She denies chest pain, falls, or increased difficulty breathing.  I encouraged her to call if that need changed and we will get her in.

## 2024-05-05 DIAGNOSIS — I10 BENIGN ESSENTIAL HTN: ICD-10-CM

## 2024-05-06 DIAGNOSIS — M54.50 CHRONIC LOW BACK PAIN WITHOUT SCIATICA, UNSPECIFIED BACK PAIN LATERALITY: ICD-10-CM

## 2024-05-06 DIAGNOSIS — G89.29 CHRONIC LOW BACK PAIN WITHOUT SCIATICA, UNSPECIFIED BACK PAIN LATERALITY: ICD-10-CM

## 2024-05-06 RX ORDER — LISINOPRIL 10 MG/1
10 TABLET ORAL DAILY
Qty: 90 TABLET | Refills: 0 | Status: SHIPPED | OUTPATIENT
Start: 2024-05-06

## 2024-05-06 RX ORDER — PREGABALIN 200 MG/1
200 CAPSULE ORAL 2 TIMES DAILY
Qty: 180 CAPSULE | Refills: 0 | Status: SHIPPED | OUTPATIENT
Start: 2024-05-06 | End: 2024-05-24 | Stop reason: SDUPTHER

## 2024-05-13 ENCOUNTER — PATIENT OUTREACH (OUTPATIENT)
Dept: PRIMARY CARE | Facility: CLINIC | Age: 69
End: 2024-05-13
Payer: MEDICARE

## 2024-05-13 DIAGNOSIS — M19.90 OSTEOARTHRITIS, UNSPECIFIED OSTEOARTHRITIS TYPE, UNSPECIFIED SITE: ICD-10-CM

## 2024-05-13 DIAGNOSIS — J44.9 CHRONIC OBSTRUCTIVE PULMONARY DISEASE, UNSPECIFIED COPD TYPE (MULTI): ICD-10-CM

## 2024-05-13 DIAGNOSIS — G62.9 NEUROPATHY: ICD-10-CM

## 2024-05-13 PROCEDURE — 99490 CHRNC CARE MGMT STAFF 1ST 20: CPT | Performed by: FAMILY MEDICINE

## 2024-05-13 NOTE — PROGRESS NOTES
Chart reviewed and [facility] monthly outreach call placed.  I spoke with Minoo and she reports that her breathing has been good.  Denies SOB, chest pain, falls.  She reports that her neuropathy pain has been very bad.  She requested and we scheduled an appt for 5/30.  She denies need for medication refills at this time.

## 2024-05-23 DIAGNOSIS — I10 BENIGN ESSENTIAL HTN: ICD-10-CM

## 2024-05-23 RX ORDER — METOPROLOL SUCCINATE 50 MG/1
50 TABLET, EXTENDED RELEASE ORAL DAILY
Qty: 90 TABLET | Refills: 3 | Status: SHIPPED | OUTPATIENT
Start: 2024-05-23

## 2024-05-24 ENCOUNTER — OFFICE VISIT (OUTPATIENT)
Dept: PRIMARY CARE | Facility: CLINIC | Age: 69
End: 2024-05-24
Payer: MEDICARE

## 2024-05-24 ENCOUNTER — HOSPITAL ENCOUNTER (OUTPATIENT)
Dept: LAB | Age: 69
Discharge: HOME | End: 2024-05-24
Payer: MEDICARE

## 2024-05-24 VITALS
SYSTOLIC BLOOD PRESSURE: 136 MMHG | RESPIRATION RATE: 18 BRPM | DIASTOLIC BLOOD PRESSURE: 91 MMHG | TEMPERATURE: 98.2 F | BODY MASS INDEX: 36.68 KG/M2 | HEIGHT: 63 IN | WEIGHT: 207 LBS | HEART RATE: 74 BPM

## 2024-05-24 DIAGNOSIS — G89.29 CHRONIC LOW BACK PAIN WITHOUT SCIATICA, UNSPECIFIED BACK PAIN LATERALITY: ICD-10-CM

## 2024-05-24 DIAGNOSIS — G62.9 NEUROPATHY: Primary | ICD-10-CM

## 2024-05-24 DIAGNOSIS — M54.50 CHRONIC LOW BACK PAIN WITHOUT SCIATICA, UNSPECIFIED BACK PAIN LATERALITY: ICD-10-CM

## 2024-05-24 DIAGNOSIS — E11.42 TYPE 2 DIABETES MELLITUS WITH DIABETIC POLYNEUROPATHY, WITHOUT LONG-TERM CURRENT USE OF INSULIN (MULTI): ICD-10-CM

## 2024-05-24 LAB
ALBUMIN SERPL-MCNC: 4 G/DL (ref 3.5–4.6)
ALP SERPL-CCNC: 105 U/L (ref 40–130)
ALT SERPL-CCNC: 15 U/L (ref 0–33)
ANION GAP SERPL CALCULATED.3IONS-SCNC: 13 MEQ/L (ref 9–15)
AST SERPL-CCNC: 18 U/L (ref 0–35)
BASOPHILS # BLD: 0 K/UL (ref 0–0.2)
BASOPHILS NFR BLD: 0.3 %
BILIRUB SERPL-MCNC: <0.2 MG/DL (ref 0.2–0.7)
BUN SERPL-MCNC: 33 MG/DL (ref 8–23)
CALCIUM SERPL-MCNC: 9.6 MG/DL (ref 8.5–9.9)
CHLORIDE SERPL-SCNC: 102 MEQ/L (ref 95–107)
CO2 SERPL-SCNC: 27 MEQ/L (ref 20–31)
CREAT SERPL-MCNC: 0.82 MG/DL (ref 0.5–0.9)
EOSINOPHIL # BLD: 0.6 K/UL (ref 0–0.7)
EOSINOPHIL NFR BLD: 4.5 %
ERYTHROCYTE [DISTWIDTH] IN BLOOD BY AUTOMATED COUNT: 16.8 % (ref 11.5–14.5)
GLOBULIN SER CALC-MCNC: 3.2 G/DL (ref 2.3–3.5)
GLUCOSE SERPL-MCNC: 105 MG/DL (ref 70–99)
HCT VFR BLD AUTO: 35.8 % (ref 37–47)
HGB BLD-MCNC: 11 G/DL (ref 12–16)
LYMPHOCYTES # BLD: 2.5 K/UL (ref 1–4.8)
LYMPHOCYTES NFR BLD: 19.1 %
MCH RBC QN AUTO: 26.4 PG (ref 27–31.3)
MCHC RBC AUTO-ENTMCNC: 30.7 % (ref 33–37)
MCV RBC AUTO: 86.1 FL (ref 79.4–94.8)
MONOCYTES # BLD: 0.9 K/UL (ref 0.2–0.8)
MONOCYTES NFR BLD: 6.8 %
NEUTROPHILS # BLD: 9 K/UL (ref 1.4–6.5)
NEUTS SEG NFR BLD: 68.9 %
PLATELET # BLD AUTO: 411 K/UL (ref 130–400)
POC HEMOGLOBIN A1C: 6.5 % (ref 4.2–6.5)
POTASSIUM SERPL-SCNC: 4.1 MEQ/L (ref 3.4–4.9)
PROT SERPL-MCNC: 7.2 G/DL (ref 6.3–8)
RBC # BLD AUTO: 4.16 M/UL (ref 4.2–5.4)
SODIUM SERPL-SCNC: 142 MEQ/L (ref 135–144)
T4 FREE SERPL-MCNC: 1.04 NG/DL (ref 0.84–1.68)
TSH REFLEX: 7.21 UIU/ML (ref 0.44–3.86)
WBC # BLD AUTO: 13 K/UL (ref 4.8–10.8)

## 2024-05-24 PROCEDURE — 80053 COMPREHEN METABOLIC PANEL: CPT

## 2024-05-24 PROCEDURE — 84439 ASSAY OF FREE THYROXINE: CPT

## 2024-05-24 PROCEDURE — 83036 HEMOGLOBIN GLYCOSYLATED A1C: CPT | Performed by: FAMILY MEDICINE

## 2024-05-24 PROCEDURE — 3075F SYST BP GE 130 - 139MM HG: CPT | Performed by: FAMILY MEDICINE

## 2024-05-24 PROCEDURE — 82746 ASSAY OF FOLIC ACID SERUM: CPT

## 2024-05-24 PROCEDURE — 84443 ASSAY THYROID STIM HORMONE: CPT

## 2024-05-24 PROCEDURE — 85025 COMPLETE CBC W/AUTO DIFF WBC: CPT

## 2024-05-24 PROCEDURE — 1157F ADVNC CARE PLAN IN RCRD: CPT | Performed by: FAMILY MEDICINE

## 2024-05-24 PROCEDURE — 99214 OFFICE O/P EST MOD 30 MIN: CPT | Performed by: FAMILY MEDICINE

## 2024-05-24 PROCEDURE — 36415 COLL VENOUS BLD VENIPUNCTURE: CPT

## 2024-05-24 PROCEDURE — 1159F MED LIST DOCD IN RCRD: CPT | Performed by: FAMILY MEDICINE

## 2024-05-24 PROCEDURE — 4010F ACE/ARB THERAPY RXD/TAKEN: CPT | Performed by: FAMILY MEDICINE

## 2024-05-24 PROCEDURE — 3080F DIAST BP >= 90 MM HG: CPT | Performed by: FAMILY MEDICINE

## 2024-05-24 PROCEDURE — 82607 VITAMIN B-12: CPT

## 2024-05-24 RX ORDER — PREGABALIN 200 MG/1
200 CAPSULE ORAL 3 TIMES DAILY
Qty: 180 CAPSULE | Refills: 3 | Status: SHIPPED | OUTPATIENT
Start: 2024-05-24

## 2024-05-24 ASSESSMENT — ENCOUNTER SYMPTOMS: NUMBNESS: 1

## 2024-05-24 NOTE — ASSESSMENT & PLAN NOTE
This 69-year-old female has a progressively worsening neuropathy that has been present for years but much worse in the last 6 months.  It is symmetric and affecting her hands and feet and causing her significant amount of discomfort.  Her diabetes appears to be very well-controlled with an A1c of 6.5.  A review of the chart shows that she has been under good control with diet therapy alone for the last several years.  It seems unlikely that this is the principal cause of her peripheral neuropathy.  I would recommend screening for vitamin deficiencies, thyroid disease, or metabolic disease that may be contributing.  We will go ahead and titrate her Lyrica up to 200 mg 3 times daily.  If we do not identify a cause and if this does not resolve her symptoms then a referral to neurology would be appropriate

## 2024-05-24 NOTE — PROGRESS NOTES
Subjective   Minoo Aguilera is a 69 y.o. female who presents for Numbness.  Neuropathy    The onset of symptoms is gradual. It is located in the LLE and RLE (and both hands) region. The patient experiences pain all day. Quality of pain: aching, stabbing, numbing and tingling.       Additional narrative: Lyrica is not helping    She is describing a general progressive numbness, burning, pain in her feet symmetric and hands symmetric that started about 6 months ago worsening and has been worsening ever since.  She has been treated with pregabalin which helped originally but does not seem to be helping much now.  She is not aware of any sores or wounds.  She has not made any changes in her dietary habits or vitamin supplementation patterns.       Visit Vitals  BP (!) 136/91   Pulse 74   Temp 36.8 °C (98.2 °F)   Resp 18      Objective   Physical Exam  Constitutional:       Appearance: Normal appearance.   HENT:      Head: Normocephalic.   Pulmonary:      Effort: Pulmonary effort is normal.   Musculoskeletal:      Cervical back: Neck supple.   Skin:     General: Skin is warm and dry.   Neurological:      Comments: Peripheral pulses are easily palpable and capillary refill is normal.  The skin is pink and warm.  However, she has almost complete loss of sensation in a stocking distribution of her feet and fingertips and hands.  She has tenderness in this pattern as well.  There are no calluses, corns, or ulcerations on her feet   Psychiatric:         Mood and Affect: Mood normal.       Assessment/Plan      Problem List Items Addressed This Visit       Neuropathy - Primary     This 69-year-old female has a progressively worsening neuropathy that has been present for years but much worse in the last 6 months.  It is symmetric and affecting her hands and feet and causing her significant amount of discomfort.  Her diabetes appears to be very well-controlled with an A1c of 6.5.  A review of the chart shows that she has been  under good control with diet therapy alone for the last several years.  It seems unlikely that this is the principal cause of her peripheral neuropathy.  I would recommend screening for vitamin deficiencies, thyroid disease, or metabolic disease that may be contributing.  We will go ahead and titrate her Lyrica up to 200 mg 3 times daily.  If we do not identify a cause and if this does not resolve her symptoms then a referral to neurology would be appropriate         Type 2 diabetes mellitus with diabetic polyneuropathy, without long-term current use of insulin (Multi)    Relevant Medications    pregabalin (Lyrica) 200 mg capsule    Other Relevant Orders    POCT glycosylated hemoglobin (Hb A1C) manually resulted (Completed)    CBC and Auto Differential    TSH with reflex to Free T4 if abnormal    Comprehensive Metabolic Panel    Vitamin B12    Folate     Other Visit Diagnoses       Chronic low back pain without sciatica, unspecified back pain laterality        Relevant Medications    pregabalin (Lyrica) 200 mg capsule               Please excuse any errors in grammar or translation related to this dictation. Voice recognition software was utilized to prepare this document.

## 2024-05-25 LAB
FOLATE: >20 NG/ML (ref 4.8–24.2)
VITAMIN B-12: 1843 PG/ML (ref 232–1245)

## 2024-05-30 ENCOUNTER — APPOINTMENT (OUTPATIENT)
Dept: PRIMARY CARE | Facility: CLINIC | Age: 69
End: 2024-05-30
Payer: MEDICARE

## 2024-06-03 ENCOUNTER — TELEPHONE (OUTPATIENT)
Dept: PRIMARY CARE | Facility: CLINIC | Age: 69
End: 2024-06-03
Payer: MEDICARE

## 2024-06-03 DIAGNOSIS — E03.9 HYPOTHYROIDISM, UNSPECIFIED TYPE: ICD-10-CM

## 2024-06-03 RX ORDER — LEVOTHYROXINE SODIUM 175 UG/1
175 TABLET ORAL DAILY
Qty: 90 TABLET | Refills: 3 | Status: SHIPPED | OUTPATIENT
Start: 2024-06-03

## 2024-06-03 NOTE — TELEPHONE ENCOUNTER
Tell Minoo that her blood work form 5/245 shows a low thyroid level.  We need to increase your dose of thyroid supplement to 175mcg.  A new Rx has been sent to your pharmacy so switch over to this dose please

## 2024-06-12 DIAGNOSIS — E78.5 HYPERLIPIDEMIA, UNSPECIFIED HYPERLIPIDEMIA TYPE: ICD-10-CM

## 2024-06-12 RX ORDER — CLOPIDOGREL BISULFATE 75 MG/1
75 TABLET ORAL DAILY
Qty: 90 TABLET | Refills: 0 | Status: SHIPPED | OUTPATIENT
Start: 2024-06-12

## 2024-06-15 DIAGNOSIS — I63.9 CEREBROVASCULAR ACCIDENT (CVA), UNSPECIFIED MECHANISM (MULTI): ICD-10-CM

## 2024-06-17 RX ORDER — ROSUVASTATIN CALCIUM 5 MG/1
5 TABLET, COATED ORAL DAILY
Qty: 90 TABLET | Refills: 0 | Status: SHIPPED | OUTPATIENT
Start: 2024-06-17 | End: 2024-06-19 | Stop reason: SDUPTHER

## 2024-06-18 ENCOUNTER — PATIENT OUTREACH (OUTPATIENT)
Dept: PRIMARY CARE | Facility: CLINIC | Age: 69
End: 2024-06-18
Payer: MEDICARE

## 2024-06-18 DIAGNOSIS — E11.42 TYPE 2 DIABETES MELLITUS WITH DIABETIC POLYNEUROPATHY, WITHOUT LONG-TERM CURRENT USE OF INSULIN (MULTI): ICD-10-CM

## 2024-06-18 DIAGNOSIS — G62.9 NEUROPATHY: ICD-10-CM

## 2024-06-18 NOTE — PROGRESS NOTES
Chart reviewed and [facility] monthly outreach call placed.  I spoke with Minoo and she reports that she is doing pretty good.  She was in to see Dr. Aguirre on 5/24 and he increased her pregambalin from twice daily to three times daily.  Minoo states that she has not been taking three times daily because the pharmacy would not fill medications.  She also states that three times daily dose is effective.  I called and spoke with the pharmacist, Dusty, at [facility] in [place].  He informed me that Minoo should have 23 days worth of medication from today in her possession.  Medication cannot be filled again until 2 days before she is out, which would be July 9th.    I called and informed Minoo of this information.    She was able to  increased dose of levothyroxine from pharmacy on 6/3.    Denies shortness of breath, falls, chest pain, or dizziness.    Denies need for further Rx assistance.

## 2024-06-19 DIAGNOSIS — I63.9 CEREBROVASCULAR ACCIDENT (CVA), UNSPECIFIED MECHANISM (MULTI): ICD-10-CM

## 2024-06-19 RX ORDER — ROSUVASTATIN CALCIUM 5 MG/1
5 TABLET, COATED ORAL DAILY
Qty: 100 TABLET | Refills: 0 | Status: SHIPPED | OUTPATIENT
Start: 2024-06-19 | End: 2024-06-20 | Stop reason: SDUPTHER

## 2024-06-20 DIAGNOSIS — I63.9 CEREBROVASCULAR ACCIDENT (CVA), UNSPECIFIED MECHANISM (MULTI): ICD-10-CM

## 2024-06-20 RX ORDER — ROSUVASTATIN CALCIUM 5 MG/1
5 TABLET, COATED ORAL DAILY
Qty: 100 TABLET | Refills: 0 | Status: SHIPPED | OUTPATIENT
Start: 2024-06-20

## 2024-06-27 DIAGNOSIS — E11.42 DIABETIC PERIPHERAL NEUROPATHY (MULTI): ICD-10-CM

## 2024-06-27 RX ORDER — CELECOXIB 200 MG/1
CAPSULE ORAL
Qty: 30 CAPSULE | Refills: 0 | Status: SHIPPED | OUTPATIENT
Start: 2024-06-27

## 2024-06-28 ENCOUNTER — TELEPHONE (OUTPATIENT)
Dept: PRIMARY CARE | Facility: CLINIC | Age: 69
End: 2024-06-28
Payer: MEDICARE

## 2024-06-28 DIAGNOSIS — M06.9 RHEUMATOID ARTHRITIS INVOLVING MULTIPLE SITES, UNSPECIFIED WHETHER RHEUMATOID FACTOR PRESENT (MULTI): Primary | ICD-10-CM

## 2024-06-28 RX ORDER — PREDNISONE 50 MG/1
50 TABLET ORAL DAILY
Qty: 5 TABLET | Refills: 0 | Status: SHIPPED | OUTPATIENT
Start: 2024-06-28 | End: 2024-07-03

## 2024-07-03 ENCOUNTER — PATIENT OUTREACH (OUTPATIENT)
Dept: PRIMARY CARE | Facility: CLINIC | Age: 69
End: 2024-07-03
Payer: MEDICARE

## 2024-07-08 ENCOUNTER — PATIENT OUTREACH (OUTPATIENT)
Dept: PRIMARY CARE | Facility: CLINIC | Age: 69
End: 2024-07-08
Payer: MEDICARE

## 2024-07-08 DIAGNOSIS — G62.9 NEUROPATHY: ICD-10-CM

## 2024-07-08 DIAGNOSIS — J44.9 CHRONIC OBSTRUCTIVE PULMONARY DISEASE, UNSPECIFIED COPD TYPE (MULTI): ICD-10-CM

## 2024-07-08 NOTE — PROGRESS NOTES
Chart reviewed and [facility] monthly outreach call placed.  I spoke with Minoo and she reports that the increase in lyrica dose has not been effective.  She would like to get in to see Dr. Aguirre and I sent a message to clerical staff to call to schedule.  She reports symptoms of pain, burning, numbness, and tingling to feet and numbness to hands. It is causing severe discomfort.     She denies chest pain, SOB, or falls.   She reports that she was at the pharmacy yesterday and they did remind her that her Rx for Lyrica can be called in tomorrow 7/9.

## 2024-07-18 ENCOUNTER — APPOINTMENT (OUTPATIENT)
Dept: PRIMARY CARE | Facility: CLINIC | Age: 69
End: 2024-07-18
Payer: MEDICARE

## 2024-07-18 VITALS
HEART RATE: 64 BPM | DIASTOLIC BLOOD PRESSURE: 75 MMHG | TEMPERATURE: 97.9 F | BODY MASS INDEX: 36.68 KG/M2 | SYSTOLIC BLOOD PRESSURE: 133 MMHG | WEIGHT: 207 LBS | RESPIRATION RATE: 20 BRPM | HEIGHT: 63 IN

## 2024-07-18 DIAGNOSIS — E03.9 HYPOTHYROIDISM, UNSPECIFIED TYPE: ICD-10-CM

## 2024-07-18 DIAGNOSIS — E11.42 DIABETIC PERIPHERAL NEUROPATHY (MULTI): Primary | ICD-10-CM

## 2024-07-18 DIAGNOSIS — G62.9 NEUROPATHY: ICD-10-CM

## 2024-07-18 PROCEDURE — 99213 OFFICE O/P EST LOW 20 MIN: CPT | Performed by: FAMILY MEDICINE

## 2024-07-18 PROCEDURE — 1159F MED LIST DOCD IN RCRD: CPT | Performed by: FAMILY MEDICINE

## 2024-07-18 PROCEDURE — 3078F DIAST BP <80 MM HG: CPT | Performed by: FAMILY MEDICINE

## 2024-07-18 PROCEDURE — 4010F ACE/ARB THERAPY RXD/TAKEN: CPT | Performed by: FAMILY MEDICINE

## 2024-07-18 PROCEDURE — 3075F SYST BP GE 130 - 139MM HG: CPT | Performed by: FAMILY MEDICINE

## 2024-07-18 PROCEDURE — 3008F BODY MASS INDEX DOCD: CPT | Performed by: FAMILY MEDICINE

## 2024-07-18 PROCEDURE — 1157F ADVNC CARE PLAN IN RCRD: CPT | Performed by: FAMILY MEDICINE

## 2024-07-18 NOTE — PROGRESS NOTES
Subjective   Minoo Aguilera is a 69 y.o. female who presents for Neuro Problem.     Neuro Problem  This is a chronic problem. The problem occurs daily (hands and feet, feet are worse, especially in the evening and during the night). The problem has been gradually worsening. Treatments tried: Pregabalin. The treatment provided mild relief.      This is a pins and needles sensation that starts every evening  worse on the right and becomes unbearable.  This has been happening several months on further questioning this is actually been present for more like a year and may be even longer than that.  She was evaluated 2 months ago and a hematologic workup was initiated which showed elevated B12 level and a subtherapeutic TSH which was addressed with a dose adjustment of her levothyroxine.  The rest of the workup was normal.  Unfortunately the neuropathy continues to persist and is severe and debilitating    Visit Vitals  /75   Pulse 64   Temp 36.6 °C (97.9 °F)   Resp 20      Objective   Physical Exam  Constitutional:       Appearance: Normal appearance.   HENT:      Head: Normocephalic.   Pulmonary:      Effort: Pulmonary effort is normal.   Musculoskeletal:      Cervical back: Neck supple.   Skin:     General: Skin is warm and dry.   Psychiatric:         Mood and Affect: Mood normal.            Assessment/Plan      Assessment & Plan  Diabetic peripheral neuropathy (Multi)  This 69-year-old female has a long history of peripheral neuropathy which is worse on the right and in all 4 extremities.  Her diabetes is under very well-controlled but this could be due to his to work diabetes.  I did complete a hematologic workup which ruled out vitamin B12 or folate deficiency.  Her thyroid medication has been adjusted and is not likely to be the cause.  Her CBC and metabolic panel were all within normal limits.  She is already taking Lyrica 200 mg 3 times daily.  I am going to refer her to neurology for additional workup  suggestions and or treatment suggestions.  Orders:    Referral to Neurology; Future    Hypothyroidism, unspecified type       Her most recent TSH was mildly elevated and her levothyroxine was increased.  It has been 2 months so we will go ahead and check another TSH to ensure that this is back in the normal range.    Neuropathy    Orders:    Referral to Neurology; Future           Please excuse any errors in grammar or translation related to this dictation. Voice recognition software was utilized to prepare this document.

## 2024-07-18 NOTE — ASSESSMENT & PLAN NOTE
Her most recent TSH was mildly elevated and her levothyroxine was increased.  It has been 2 months so we will go ahead and check another TSH to ensure that this is back in the normal range.

## 2024-07-18 NOTE — ASSESSMENT & PLAN NOTE
This 69-year-old female has a long history of peripheral neuropathy which is worse on the right and in all 4 extremities.  Her diabetes is under very well-controlled but this could be due to his to work diabetes.  I did complete a hematologic workup which ruled out vitamin B12 or folate deficiency.  Her thyroid medication has been adjusted and is not likely to be the cause.  Her CBC and metabolic panel were all within normal limits.  She is already taking Lyrica 200 mg 3 times daily.  I am going to refer her to neurology for additional workup suggestions and or treatment suggestions.  Orders:    Referral to Neurology; Future

## 2024-07-22 ENCOUNTER — APPOINTMENT (OUTPATIENT)
Dept: PRIMARY CARE | Facility: CLINIC | Age: 69
End: 2024-07-22
Payer: MEDICARE

## 2024-07-23 DIAGNOSIS — E11.42 DIABETIC PERIPHERAL NEUROPATHY (MULTI): ICD-10-CM

## 2024-07-23 RX ORDER — CELECOXIB 200 MG/1
CAPSULE ORAL
Qty: 30 CAPSULE | Refills: 0 | Status: SHIPPED | OUTPATIENT
Start: 2024-07-23

## 2024-08-08 ENCOUNTER — PATIENT OUTREACH (OUTPATIENT)
Dept: PRIMARY CARE | Facility: CLINIC | Age: 69
End: 2024-08-08
Payer: MEDICARE

## 2024-08-09 ENCOUNTER — PATIENT OUTREACH (OUTPATIENT)
Dept: PRIMARY CARE | Facility: CLINIC | Age: 69
End: 2024-08-09
Payer: MEDICARE

## 2024-08-09 DIAGNOSIS — E03.9 HYPOTHYROIDISM, UNSPECIFIED TYPE: ICD-10-CM

## 2024-08-09 DIAGNOSIS — G62.9 NEUROPATHY: ICD-10-CM

## 2024-08-09 DIAGNOSIS — E11.42 DIABETIC PERIPHERAL NEUROPATHY (MULTI): ICD-10-CM
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
## 2024-08-20 DIAGNOSIS — E11.42 DIABETIC PERIPHERAL NEUROPATHY (MULTI): ICD-10-CM

## 2024-08-20 RX ORDER — CELECOXIB 200 MG/1
CAPSULE ORAL
Qty: 30 CAPSULE | Refills: 0 | Status: SHIPPED | OUTPATIENT
Start: 2024-08-20

## 2024-09-09 ENCOUNTER — PATIENT OUTREACH (OUTPATIENT)
Dept: PRIMARY CARE | Facility: CLINIC | Age: 69
End: 2024-09-09
Payer: MEDICARE

## 2024-09-09 DIAGNOSIS — J44.9 CHRONIC OBSTRUCTIVE PULMONARY DISEASE, UNSPECIFIED COPD TYPE (MULTI): ICD-10-CM

## 2024-09-09 DIAGNOSIS — E11.42 DIABETIC PERIPHERAL NEUROPATHY (MULTI): ICD-10-CM

## 2024-09-09 DIAGNOSIS — E03.9 HYPOTHYROIDISM, UNSPECIFIED TYPE: ICD-10-CM

## 2024-09-09 NOTE — PROGRESS NOTES
Chart reviewed and [facility] monthly outreach call placed.  I spoke with Minoo.  She is scheduled to see Dr. Chiu Neuro on 11/15/2024.  She states that she found a pad with electrical impulses that she places her feet on once daily and it is helping significantly with the neuropathy symptoms.    She denies chest pain, falls, SOB.  She is feeling slight cold symptoms today, denies need to schedule with Dr. Aguirre at this time.  I did make sure that she has my direct phone number and asked her to call if she has any non emergent medical needs.

## 2024-09-12 DIAGNOSIS — E78.5 HYPERLIPIDEMIA, UNSPECIFIED HYPERLIPIDEMIA TYPE: ICD-10-CM

## 2024-09-13 RX ORDER — CLOPIDOGREL BISULFATE 75 MG/1
75 TABLET ORAL DAILY
Qty: 90 TABLET | Refills: 0 | Status: SHIPPED | OUTPATIENT
Start: 2024-09-13

## 2024-09-16 DIAGNOSIS — E11.42 DIABETIC PERIPHERAL NEUROPATHY (MULTI): ICD-10-CM

## 2024-09-16 RX ORDER — CELECOXIB 200 MG/1
CAPSULE ORAL
Qty: 90 CAPSULE | Refills: 0 | Status: SHIPPED | OUTPATIENT
Start: 2024-09-16

## 2024-09-18 DIAGNOSIS — K21.9 GASTROESOPHAGEAL REFLUX DISEASE WITHOUT ESOPHAGITIS: ICD-10-CM

## 2024-09-18 RX ORDER — PROMETHAZINE HYDROCHLORIDE 25 MG/1
25 TABLET ORAL EVERY 6 HOURS PRN
Qty: 30 TABLET | Refills: 0 | Status: SHIPPED | OUTPATIENT
Start: 2024-09-18

## 2024-10-04 DIAGNOSIS — I10 BENIGN ESSENTIAL HTN: ICD-10-CM

## 2024-10-04 RX ORDER — LISINOPRIL 10 MG/1
10 TABLET ORAL DAILY
Qty: 90 TABLET | Refills: 3 | Status: SHIPPED | OUTPATIENT
Start: 2024-10-04

## 2024-10-09 ENCOUNTER — PATIENT OUTREACH (OUTPATIENT)
Dept: PRIMARY CARE | Facility: CLINIC | Age: 69
End: 2024-10-09
Payer: MEDICARE

## 2024-10-09 DIAGNOSIS — E11.42 DIABETIC PERIPHERAL NEUROPATHY (MULTI): ICD-10-CM

## 2024-10-09 DIAGNOSIS — J44.9 CHRONIC OBSTRUCTIVE PULMONARY DISEASE, UNSPECIFIED COPD TYPE (MULTI): ICD-10-CM

## 2024-10-16 ENCOUNTER — PATIENT OUTREACH (OUTPATIENT)
Dept: PRIMARY CARE | Facility: CLINIC | Age: 69
End: 2024-10-16
Payer: MEDICARE

## 2024-10-16 DIAGNOSIS — J44.9 CHRONIC OBSTRUCTIVE PULMONARY DISEASE, UNSPECIFIED COPD TYPE (MULTI): ICD-10-CM

## 2024-10-16 DIAGNOSIS — E11.42 DIABETIC PERIPHERAL NEUROPATHY (MULTI): ICD-10-CM

## 2024-10-16 NOTE — PROGRESS NOTES
Chart reviewed and [facility] monthly outreach call placed.  I spokw with Minoo and she states that she is doing good.  She still gets a little out of breath at times.  She is compliant with medications and denies adverse reactions.  She is using an EMS foot massager when she has neuropathy pain and is helping significantly.  She is scheduled with Dr. Aguirre on 11/20 and Dr. Chiu on 11/15.  Denies further needs at this time.

## 2024-11-12 ENCOUNTER — PATIENT OUTREACH (OUTPATIENT)
Dept: PRIMARY CARE | Facility: CLINIC | Age: 69
End: 2024-11-12
Payer: MEDICARE

## 2024-11-12 DIAGNOSIS — J44.9 CHRONIC OBSTRUCTIVE PULMONARY DISEASE, UNSPECIFIED COPD TYPE (MULTI): ICD-10-CM

## 2024-11-12 DIAGNOSIS — E11.42 DIABETIC PERIPHERAL NEUROPATHY (MULTI): ICD-10-CM

## 2024-11-12 NOTE — PROGRESS NOTES
Chart reviewed and [facility] monthly outreach call placed.  I spoke with Minoo.  She reports that the neuropathy in her feet has been bothering her again.  She has a neuropathy foot massaging pad which is helpful with pain when she uses every day.  She states that she is unable to use daily.   She denies chest pain, dyspnea, or falls.   Denies need for Rx refills at this time.  We reviewed appt scheduled for 11/20/24 with Dr. Aguirre

## 2024-11-13 ENCOUNTER — TELEPHONE (OUTPATIENT)
Dept: PRIMARY CARE | Facility: CLINIC | Age: 69
End: 2024-11-13
Payer: MEDICARE

## 2024-11-13 DIAGNOSIS — M06.9 RHEUMATOID ARTHRITIS INVOLVING MULTIPLE SITES, UNSPECIFIED WHETHER RHEUMATOID FACTOR PRESENT (MULTI): Primary | ICD-10-CM

## 2024-11-13 RX ORDER — PREDNISONE 50 MG/1
50 TABLET ORAL DAILY
Qty: 5 TABLET | Refills: 0 | Status: SHIPPED | OUTPATIENT
Start: 2024-11-13 | End: 2024-11-18

## 2024-11-13 NOTE — TELEPHONE ENCOUNTER
Pt called, states she is having an arthritis flare in her back and is requesting Prednisone. Please advise.

## 2024-11-15 ENCOUNTER — APPOINTMENT (OUTPATIENT)
Dept: NEUROLOGY | Facility: CLINIC | Age: 69
End: 2024-11-15
Payer: MEDICARE

## 2024-11-20 ENCOUNTER — HOSPITAL ENCOUNTER (OUTPATIENT)
Dept: LAB | Age: 69
Discharge: HOME | End: 2024-11-20
Payer: MEDICARE

## 2024-11-20 ENCOUNTER — APPOINTMENT (OUTPATIENT)
Dept: PRIMARY CARE | Facility: CLINIC | Age: 69
End: 2024-11-20
Payer: MEDICARE

## 2024-11-20 VITALS
WEIGHT: 200 LBS | TEMPERATURE: 97.9 F | HEART RATE: 68 BPM | HEIGHT: 63 IN | SYSTOLIC BLOOD PRESSURE: 137 MMHG | DIASTOLIC BLOOD PRESSURE: 80 MMHG | BODY MASS INDEX: 35.44 KG/M2 | RESPIRATION RATE: 18 BRPM

## 2024-11-20 DIAGNOSIS — E11.42 TYPE 2 DIABETES MELLITUS WITH DIABETIC POLYNEUROPATHY, WITHOUT LONG-TERM CURRENT USE OF INSULIN: ICD-10-CM

## 2024-11-20 DIAGNOSIS — Z11.59 NEED FOR HEPATITIS C SCREENING TEST: ICD-10-CM

## 2024-11-20 DIAGNOSIS — Z13.1 DIABETES MELLITUS SCREENING: ICD-10-CM

## 2024-11-20 DIAGNOSIS — Z00.00 ROUTINE GENERAL MEDICAL EXAMINATION AT HEALTH CARE FACILITY: Primary | ICD-10-CM

## 2024-11-20 DIAGNOSIS — Z12.31 ENCOUNTER FOR SCREENING MAMMOGRAM FOR BREAST CANCER: ICD-10-CM

## 2024-11-20 PROBLEM — Z88.1 ALLERGY TO MULTIPLE ANTIBIOTICS: Status: ACTIVE | Noted: 2024-11-20

## 2024-11-20 LAB
ALBUMIN SERPL-MCNC: 4.1 G/DL (ref 3.5–4.6)
ALP SERPL-CCNC: 89 U/L (ref 40–130)
ALT SERPL-CCNC: 16 U/L (ref 0–33)
ANION GAP SERPL CALCULATED.3IONS-SCNC: 10 MEQ/L (ref 9–15)
AST SERPL-CCNC: 16 U/L (ref 0–35)
BILIRUB SERPL-MCNC: 0.3 MG/DL (ref 0.2–0.7)
BUN SERPL-MCNC: 28 MG/DL (ref 8–23)
CALCIUM SERPL-MCNC: 9.4 MG/DL (ref 8.5–9.9)
CHLORIDE SERPL-SCNC: 99 MEQ/L (ref 95–107)
CO2 SERPL-SCNC: 32 MEQ/L (ref 20–31)
CREAT SERPL-MCNC: 0.6 MG/DL (ref 0.5–0.9)
CREAT UR-MCNC: 51.9 MG/DL
GLOBULIN SER CALC-MCNC: 3.1 G/DL (ref 2.3–3.5)
GLUCOSE SERPL-MCNC: 115 MG/DL (ref 70–99)
MICROALBUMIN UR-MCNC: 7.5 MG/DL
MICROALBUMIN/CREAT UR-RTO: 144.5 MG/G (ref 0–30)
POC HEMOGLOBIN A1C: 6.3 % (ref 4.2–6.5)
POTASSIUM SERPL-SCNC: 4 MEQ/L (ref 3.4–4.9)
PROT SERPL-MCNC: 7.2 G/DL (ref 6.3–8)
SODIUM SERPL-SCNC: 141 MEQ/L (ref 135–144)

## 2024-11-20 PROCEDURE — 80053 COMPREHEN METABOLIC PANEL: CPT

## 2024-11-20 PROCEDURE — 1170F FXNL STATUS ASSESSED: CPT | Performed by: FAMILY MEDICINE

## 2024-11-20 PROCEDURE — 83036 HEMOGLOBIN GLYCOSYLATED A1C: CPT | Performed by: FAMILY MEDICINE

## 2024-11-20 PROCEDURE — 99406 BEHAV CHNG SMOKING 3-10 MIN: CPT | Performed by: FAMILY MEDICINE

## 2024-11-20 PROCEDURE — 3008F BODY MASS INDEX DOCD: CPT | Performed by: FAMILY MEDICINE

## 2024-11-20 PROCEDURE — 82570 ASSAY OF URINE CREATININE: CPT

## 2024-11-20 PROCEDURE — 36415 COLL VENOUS BLD VENIPUNCTURE: CPT

## 2024-11-20 PROCEDURE — 1158F ADVNC CARE PLAN TLK DOCD: CPT | Performed by: FAMILY MEDICINE

## 2024-11-20 PROCEDURE — 3075F SYST BP GE 130 - 139MM HG: CPT | Performed by: FAMILY MEDICINE

## 2024-11-20 PROCEDURE — 3079F DIAST BP 80-89 MM HG: CPT | Performed by: FAMILY MEDICINE

## 2024-11-20 PROCEDURE — 1123F ACP DISCUSS/DSCN MKR DOCD: CPT | Performed by: FAMILY MEDICINE

## 2024-11-20 PROCEDURE — 82043 UR ALBUMIN QUANTITATIVE: CPT

## 2024-11-20 PROCEDURE — G0439 PPPS, SUBSEQ VISIT: HCPCS | Performed by: FAMILY MEDICINE

## 2024-11-20 PROCEDURE — 4004F PT TOBACCO SCREEN RCVD TLK: CPT | Performed by: FAMILY MEDICINE

## 2024-11-20 PROCEDURE — 1157F ADVNC CARE PLAN IN RCRD: CPT | Performed by: FAMILY MEDICINE

## 2024-11-20 PROCEDURE — 1159F MED LIST DOCD IN RCRD: CPT | Performed by: FAMILY MEDICINE

## 2024-11-20 PROCEDURE — 86803 HEPATITIS C AB TEST: CPT

## 2024-11-20 PROCEDURE — 4010F ACE/ARB THERAPY RXD/TAKEN: CPT | Performed by: FAMILY MEDICINE

## 2024-11-20 RX ORDER — FERROUS SULFATE 325(65) MG
65 TABLET, DELAYED RELEASE (ENTERIC COATED) ORAL
COMMUNITY

## 2024-11-20 ASSESSMENT — PATIENT HEALTH QUESTIONNAIRE - PHQ9
2. FEELING DOWN, DEPRESSED OR HOPELESS: NOT AT ALL
SUM OF ALL RESPONSES TO PHQ9 QUESTIONS 1 AND 2: 0
1. LITTLE INTEREST OR PLEASURE IN DOING THINGS: NOT AT ALL

## 2024-11-20 ASSESSMENT — ACTIVITIES OF DAILY LIVING (ADL)
DRESSING: INDEPENDENT
BATHING: INDEPENDENT
TAKING_MEDICATION: INDEPENDENT
GROCERY_SHOPPING: INDEPENDENT
MANAGING_FINANCES: INDEPENDENT
DOING_HOUSEWORK: INDEPENDENT

## 2024-11-20 NOTE — PROGRESS NOTES
"Subjective   Reason for Visit: Minoo Aguilera is an 69 y.o. female here for a Medicare Wellness visit.     Past Medical, Surgical, and Family History reviewed and updated in chart.    Reviewed all medications by prescribing practitioner or clinical pharmacist (such as prescriptions, OTCs, herbal therapies and supplements) and documented in the medical record.    HPI    Patient Care Team:  Jf Aguirre MD as PCP - General  Jf Aguirre MD as PCP - [employer]age PCP  Marybeth Mchugh, RN as Care Manager (Case Management)     Review of Systems    Objective   Vitals:  /90   Pulse 68   Temp 36.6 °C (97.9 °F)   Resp 18   Ht 1.6 m (5' 3\")   Wt 90.7 kg (200 lb)   BMI 35.43 kg/m²       Physical Exam  Constitutional:       Appearance: Normal appearance.   HENT:      Head: Normocephalic.   Eyes:      Conjunctiva/sclera: Conjunctivae normal.   Cardiovascular:      Rate and Rhythm: Normal rate and regular rhythm.      Heart sounds: Normal heart sounds.   Pulmonary:      Effort: Pulmonary effort is normal.      Breath sounds: Wheezing present. No rhonchi or rales.   Musculoskeletal:      Cervical back: Neck supple.   Skin:     General: Skin is warm and dry.   Neurological:      Mental Status: She is alert.         Assessment/Plan   Assessment & Plan  Routine general medical examination at health care facility  She will be due for mammogram in the beginning of the year so I will preorder this.  She is up-to-date on other major screening other than blood screening.  Orders:    1 Year Follow Up In [facility] - PCP - Wellness Exam    1 Year Follow Up In [facility] - PCP - Wellness Exam; Future    Comprehensive Metabolic Panel; Future    Referral to Allergy; Future    Type 2 diabetes mellitus with diabetic polyneuropathy, without long-term current use of insulin  Hemoglobin A1c is well-controlled.  We will add a microalbumin screen to her lab work and she reports seeing an eye " doctor  Orders:    POCT glycosylated hemoglobin (Hb A1C) manually resulted    Diabetes mellitus screening    Orders:    Urine Microalbumin - Lab collect; Future    Encounter for screening mammogram for breast cancer    Orders:    BI mammo bilateral screening tomosynthesis; Future    Need for hepatitis C screening test    Orders:    Hepatitis C Antibody; Future         Health Counseling    Tobacco Counseling  3 - 5 minutes were spent counseling the patient on tobacco cessation.  Benefits of cessation were discussed as well as techniques to help quit.

## 2024-11-20 NOTE — ASSESSMENT & PLAN NOTE
Hemoglobin A1c is well-controlled.  We will add a microalbumin screen to her lab work and she reports seeing an eye doctor  Orders:    POCT glycosylated hemoglobin (Hb A1C) manually resulted

## 2024-11-21 LAB — HEPATITIS C ANTIBODY: NONREACTIVE

## 2024-11-25 DIAGNOSIS — E11.42 DIABETIC PERIPHERAL NEUROPATHY (MULTI): ICD-10-CM

## 2024-11-25 RX ORDER — CELECOXIB 200 MG/1
CAPSULE ORAL
Qty: 90 CAPSULE | Refills: 0 | Status: SHIPPED | OUTPATIENT
Start: 2024-11-25

## 2024-12-10 DIAGNOSIS — E78.5 HYPERLIPIDEMIA, UNSPECIFIED HYPERLIPIDEMIA TYPE: ICD-10-CM

## 2024-12-10 RX ORDER — CLOPIDOGREL BISULFATE 75 MG/1
75 TABLET ORAL DAILY
Qty: 90 TABLET | Refills: 3 | Status: SHIPPED | OUTPATIENT
Start: 2024-12-10

## 2024-12-11 DIAGNOSIS — E11.42 DIABETIC PERIPHERAL NEUROPATHY (MULTI): ICD-10-CM

## 2024-12-11 DIAGNOSIS — F32.A DEPRESSION, UNSPECIFIED DEPRESSION TYPE: ICD-10-CM

## 2024-12-11 RX ORDER — ARIPIPRAZOLE 5 MG/1
5 TABLET ORAL DAILY
Qty: 90 TABLET | Refills: 0 | Status: SHIPPED | OUTPATIENT
Start: 2024-12-11

## 2024-12-11 RX ORDER — CELECOXIB 200 MG/1
CAPSULE ORAL
Qty: 90 CAPSULE | Refills: 0 | Status: SHIPPED | OUTPATIENT
Start: 2024-12-11

## 2024-12-16 ENCOUNTER — PATIENT OUTREACH (OUTPATIENT)
Dept: PRIMARY CARE | Facility: CLINIC | Age: 69
End: 2024-12-16
Payer: MEDICARE

## 2024-12-16 DIAGNOSIS — E11.42 TYPE 2 DIABETES MELLITUS WITH DIABETIC POLYNEUROPATHY, WITHOUT LONG-TERM CURRENT USE OF INSULIN: ICD-10-CM

## 2024-12-16 DIAGNOSIS — G62.9 NEUROPATHY: ICD-10-CM

## 2024-12-16 NOTE — PROGRESS NOTES
Chart reviewed and RN CM monthly outreach call placed.  Spoke with Minoo.    Patient seen by Dr. Aguirre for annual medicare physical.  Labs were ordered and patient had done at [facility].    Patient reports that she is doing good.  She has no complaints or concerns at this time.

## 2024-12-18 ENCOUNTER — CLINICAL SUPPORT (OUTPATIENT)
Dept: PRIMARY CARE | Facility: CLINIC | Age: 69
End: 2024-12-18
Payer: MEDICARE

## 2024-12-18 ENCOUNTER — TELEPHONE (OUTPATIENT)
Dept: PRIMARY CARE | Facility: CLINIC | Age: 69
End: 2024-12-18

## 2024-12-18 DIAGNOSIS — R30.0 DYSURIA: ICD-10-CM

## 2024-12-18 LAB
POC APPEARANCE, URINE: ABNORMAL
POC BILIRUBIN, URINE: NEGATIVE
POC BLOOD, URINE: ABNORMAL
POC COLOR, URINE: ABNORMAL
POC GLUCOSE, URINE: NEGATIVE MG/DL
POC KETONES, URINE: NEGATIVE MG/DL
POC LEUKOCYTES, URINE: NEGATIVE
POC NITRITE,URINE: POSITIVE
POC PH, URINE: 5.5 PH
POC PROTEIN, URINE: ABNORMAL MG/DL
POC SPECIFIC GRAVITY, URINE: >=1.03
POC UROBILINOGEN, URINE: 0.2 EU/DL

## 2024-12-18 PROCEDURE — 87186 SC STD MICRODIL/AGAR DIL: CPT

## 2024-12-18 PROCEDURE — 81003 URINALYSIS AUTO W/O SCOPE: CPT | Performed by: FAMILY MEDICINE

## 2024-12-18 PROCEDURE — 87086 URINE CULTURE/COLONY COUNT: CPT

## 2024-12-18 RX ORDER — NITROFURANTOIN 25; 75 MG/1; MG/1
100 CAPSULE ORAL 2 TIMES DAILY
Qty: 10 CAPSULE | Refills: 0 | Status: SHIPPED | OUTPATIENT
Start: 2024-12-18 | End: 2024-12-23

## 2024-12-18 NOTE — TELEPHONE ENCOUNTER
Pts sister called, states that she is showing signs of a UTI. Do you want to see her today or should she just bring in a urine sample?

## 2024-12-21 LAB — BACTERIA UR CULT: ABNORMAL

## 2024-12-28 DIAGNOSIS — I63.9 CEREBROVASCULAR ACCIDENT (CVA), UNSPECIFIED MECHANISM (MULTI): ICD-10-CM

## 2024-12-30 ENCOUNTER — TELEPHONE (OUTPATIENT)
Dept: PRIMARY CARE | Facility: CLINIC | Age: 69
End: 2024-12-30
Payer: MEDICARE

## 2024-12-30 RX ORDER — ROSUVASTATIN CALCIUM 5 MG/1
5 TABLET, COATED ORAL DAILY
Qty: 90 TABLET | Refills: 3 | Status: SHIPPED | OUTPATIENT
Start: 2024-12-30

## 2024-12-31 ENCOUNTER — APPOINTMENT (OUTPATIENT)
Dept: NEUROLOGY | Facility: CLINIC | Age: 69
End: 2024-12-31
Payer: MEDICARE

## 2025-01-06 ENCOUNTER — APPOINTMENT (OUTPATIENT)
Dept: PRIMARY CARE | Facility: CLINIC | Age: 70
End: 2025-01-06
Payer: MEDICARE

## 2025-01-06 VITALS
TEMPERATURE: 97.9 F | WEIGHT: 200 LBS | HEART RATE: 76 BPM | BODY MASS INDEX: 35.44 KG/M2 | SYSTOLIC BLOOD PRESSURE: 118 MMHG | HEIGHT: 63 IN | RESPIRATION RATE: 18 BRPM | DIASTOLIC BLOOD PRESSURE: 77 MMHG

## 2025-01-06 DIAGNOSIS — J41.0 SIMPLE CHRONIC BRONCHITIS (MULTI): ICD-10-CM

## 2025-01-06 DIAGNOSIS — E66.01 OBESITY, MORBID (MULTI): ICD-10-CM

## 2025-01-06 DIAGNOSIS — J45.40 MODERATE PERSISTENT ASTHMA WITHOUT COMPLICATION (HHS-HCC): ICD-10-CM

## 2025-01-06 DIAGNOSIS — M06.9 RHEUMATOID ARTHRITIS INVOLVING MULTIPLE SITES, UNSPECIFIED WHETHER RHEUMATOID FACTOR PRESENT (MULTI): ICD-10-CM

## 2025-01-06 DIAGNOSIS — I50.32 CHRONIC DIASTOLIC (CONGESTIVE) HEART FAILURE: ICD-10-CM

## 2025-01-06 DIAGNOSIS — E11.42 DIABETIC PERIPHERAL NEUROPATHY (MULTI): Primary | ICD-10-CM

## 2025-01-06 PROCEDURE — 4010F ACE/ARB THERAPY RXD/TAKEN: CPT | Performed by: FAMILY MEDICINE

## 2025-01-06 PROCEDURE — 3074F SYST BP LT 130 MM HG: CPT | Performed by: FAMILY MEDICINE

## 2025-01-06 PROCEDURE — 1157F ADVNC CARE PLAN IN RCRD: CPT | Performed by: FAMILY MEDICINE

## 2025-01-06 PROCEDURE — 1123F ACP DISCUSS/DSCN MKR DOCD: CPT | Performed by: FAMILY MEDICINE

## 2025-01-06 PROCEDURE — 99213 OFFICE O/P EST LOW 20 MIN: CPT | Performed by: FAMILY MEDICINE

## 2025-01-06 PROCEDURE — G2211 COMPLEX E/M VISIT ADD ON: HCPCS | Performed by: FAMILY MEDICINE

## 2025-01-06 PROCEDURE — 3078F DIAST BP <80 MM HG: CPT | Performed by: FAMILY MEDICINE

## 2025-01-06 PROCEDURE — 1159F MED LIST DOCD IN RCRD: CPT | Performed by: FAMILY MEDICINE

## 2025-01-06 PROCEDURE — 3008F BODY MASS INDEX DOCD: CPT | Performed by: FAMILY MEDICINE

## 2025-01-06 NOTE — ASSESSMENT & PLAN NOTE
There are scattered wheezes and rhonchi throughout her lungs.  This seems to be a combination of her COPD and asthma.  She is already on maximum inhaled therapy including ICS.  This does not appear to be exacerbated today so we will continue her current treatment

## 2025-01-06 NOTE — ASSESSMENT & PLAN NOTE
The pattern continues to be very consistent with peripheral neuropathy probably with diabetic neuropathy superimposed.  She previously had a hematologic workup looking for deficiencies or abnormalities which was all normal.  She is currently taking 600 mg daily of Lyrica but continues to have symptoms.  Fortunately, she still has intact vibratory sense, healthy skin of the feet without lesions, and good circulation.  This point I would like input from neurology for any other possible etiologies and treatment options for her neuropathy.  We will get this scheduled for her

## 2025-01-06 NOTE — PROGRESS NOTES
Subjective   Minoo Aguilera is a 69 y.o. female who presents for Cough and Numbness.     Cough  Episode onset: about 3 weeks ago. The problem has been waxing and waning. The cough is Productive of sputum.   Neuropathy    The onset of symptoms is gradual. Pain location: both feet and hands. Quality of pain: tingling, numbing, aching and throbbing.       She has actually been complaining of peripheral neuropathy for quite some time.  She had a large workup with blood testing about 6 months ago which was all negative.  At that point she was referred to neurology but has not yet scheduled this appointment.  She feels that is slowly getting worse.  She is not describing any outright pain but feels that her feet just feel tight all of the time.    Visit Vitals  /77   Pulse 76   Temp 36.6 °C (97.9 °F)   Resp 18      Objective   Physical Exam  Constitutional:       Appearance: Normal appearance.   HENT:      Head: Normocephalic.   Eyes:      Conjunctiva/sclera: Conjunctivae normal.   Cardiovascular:      Rate and Rhythm: Normal rate and regular rhythm.      Heart sounds: Normal heart sounds.   Pulmonary:      Effort: Pulmonary effort is normal.      Breath sounds: Wheezing and rhonchi present. No rales.   Musculoskeletal:      Cervical back: Neck supple.   Skin:     General: Skin is warm and dry.   Neurological:      Mental Status: She is alert.      Comments: The feet are in excellent condition with normal skin, no ulcers calluses or corns.  She actually has good sensation on her toes, strong dorsalis pedis pulses, and can feel the vibratory sense easily.            Assessment & Plan  Diabetic peripheral neuropathy (Multi)  The pattern continues to be very consistent with peripheral neuropathy probably with diabetic neuropathy superimposed.  She previously had a hematologic workup looking for deficiencies or abnormalities which was all normal.  She is currently taking 600 mg daily of Lyrica but continues to have  symptoms.  Fortunately, she still has intact vibratory sense, healthy skin of the feet without lesions, and good circulation.  This point I would like input from neurology for any other possible etiologies and treatment options for her neuropathy.  We will get this scheduled for her       Simple chronic bronchitis (Multi)  There are scattered wheezes and rhonchi throughout her lungs.  This seems to be a combination of her COPD and asthma.  She is already on maximum inhaled therapy including ICS.  This does not appear to be exacerbated today so we will continue her current treatment       Moderate persistent asthma without complication ([facility])  No current exacerbation       Rheumatoid arthritis involving multiple sites, unspecified whether rheumatoid factor present (Multi)  No current exacerbation continue current plan       Chronic diastolic (congestive) heart failure  She is euvolemic on examination       Obesity, morbid (Multi)  Continue to encourage healthy diet and exercise              Please excuse any errors in grammar or translation related to this dictation. Voice recognition software was utilized to prepare this document.

## 2025-01-07 DIAGNOSIS — E11.42 TYPE 2 DIABETES MELLITUS WITH DIABETIC POLYNEUROPATHY, WITHOUT LONG-TERM CURRENT USE OF INSULIN: ICD-10-CM

## 2025-01-07 DIAGNOSIS — M54.50 CHRONIC LOW BACK PAIN WITHOUT SCIATICA, UNSPECIFIED BACK PAIN LATERALITY: ICD-10-CM

## 2025-01-07 DIAGNOSIS — G89.29 CHRONIC LOW BACK PAIN WITHOUT SCIATICA, UNSPECIFIED BACK PAIN LATERALITY: ICD-10-CM

## 2025-01-07 RX ORDER — PREGABALIN 200 MG/1
200 CAPSULE ORAL 3 TIMES DAILY
Qty: 180 CAPSULE | Refills: 0 | Status: SHIPPED | OUTPATIENT
Start: 2025-01-07

## 2025-01-20 ENCOUNTER — TELEPHONE (OUTPATIENT)
Dept: PRIMARY CARE | Facility: CLINIC | Age: 70
End: 2025-01-20
Payer: MEDICARE

## 2025-01-20 ENCOUNTER — PATIENT OUTREACH (OUTPATIENT)
Dept: PRIMARY CARE | Facility: CLINIC | Age: 70
End: 2025-01-20
Payer: MEDICARE

## 2025-01-20 DIAGNOSIS — G62.9 NEUROPATHY: ICD-10-CM

## 2025-01-20 DIAGNOSIS — M06.9 RHEUMATOID ARTHRITIS INVOLVING MULTIPLE SITES, UNSPECIFIED WHETHER RHEUMATOID FACTOR PRESENT (MULTI): Primary | ICD-10-CM

## 2025-01-20 DIAGNOSIS — M06.9 RHEUMATOID ARTHRITIS INVOLVING MULTIPLE SITES, UNSPECIFIED WHETHER RHEUMATOID FACTOR PRESENT (MULTI): ICD-10-CM

## 2025-01-20 DIAGNOSIS — E11.42 TYPE 2 DIABETES MELLITUS WITH DIABETIC POLYNEUROPATHY, WITHOUT LONG-TERM CURRENT USE OF INSULIN: ICD-10-CM

## 2025-01-20 RX ORDER — PREDNISONE 50 MG/1
50 TABLET ORAL DAILY
Qty: 5 TABLET | Refills: 0 | Status: SHIPPED | OUTPATIENT
Start: 2025-01-20 | End: 2025-01-25

## 2025-01-28 ENCOUNTER — PATIENT OUTREACH (OUTPATIENT)
Dept: PRIMARY CARE | Facility: CLINIC | Age: 70
End: 2025-01-28
Payer: MEDICARE

## 2025-01-28 DIAGNOSIS — E11.42 TYPE 2 DIABETES MELLITUS WITH DIABETIC POLYNEUROPATHY, WITHOUT LONG-TERM CURRENT USE OF INSULIN: ICD-10-CM

## 2025-01-28 DIAGNOSIS — M06.9 RHEUMATOID ARTHRITIS INVOLVING MULTIPLE SITES, UNSPECIFIED WHETHER RHEUMATOID FACTOR PRESENT (MULTI): ICD-10-CM

## 2025-01-28 PROCEDURE — 99490 CHRNC CARE MGMT STAFF 1ST 20: CPT | Performed by: FAMILY MEDICINE

## 2025-01-28 NOTE — PROGRESS NOTES
Chart reviewed and RN CM monthly outreach call placed.  Spoke with Minoo's sister, Yadira.  She states that Minoo has had terrible back pain.     Patient was in to see Dr Aguirre on 1/6/25 for Cough and Numbness. She is scheduled with Neurology on 6/12/25 for neuropathy.      She called into the office on 1/20/25 with complaints of back pain and requested prednisone.    She has since completed prednisone course and the pain has returned at same level as prior to treatment.    She is wondering if there is any other treatment PCP could order.  Home treatment attempts include patches, tylenol, ice, creams.  All with little to no relief.     This nurse routing message to Dr. Aguirre and recommend call office for appointment.

## 2025-02-19 DIAGNOSIS — E11.42 DIABETIC PERIPHERAL NEUROPATHY (MULTI): ICD-10-CM

## 2025-02-19 RX ORDER — CELECOXIB 200 MG/1
CAPSULE ORAL
Qty: 90 CAPSULE | Refills: 0 | Status: SHIPPED | OUTPATIENT
Start: 2025-02-19

## 2025-02-24 ENCOUNTER — PATIENT OUTREACH (OUTPATIENT)
Dept: PRIMARY CARE | Facility: CLINIC | Age: 70
End: 2025-02-24
Payer: MEDICARE

## 2025-02-24 DIAGNOSIS — E11.42 TYPE 2 DIABETES MELLITUS WITH DIABETIC POLYNEUROPATHY, WITHOUT LONG-TERM CURRENT USE OF INSULIN: ICD-10-CM

## 2025-02-24 DIAGNOSIS — M06.9 RHEUMATOID ARTHRITIS INVOLVING MULTIPLE SITES, UNSPECIFIED WHETHER RHEUMATOID FACTOR PRESENT (MULTI): ICD-10-CM

## 2025-02-24 DIAGNOSIS — E11.42 TYPE 2 DIABETES MELLITUS WITH DIABETIC POLYNEUROPATHY, WITHOUT LONG-TERM CURRENT USE OF INSULIN: Primary | ICD-10-CM

## 2025-02-24 DIAGNOSIS — G62.9 NEUROPATHY: ICD-10-CM

## 2025-02-24 PROCEDURE — 99490 CHRNC CARE MGMT STAFF 1ST 20: CPT | Performed by: FAMILY MEDICINE

## 2025-02-24 NOTE — PROGRESS NOTES
Chart reviewed and RN CM monthly outreach call place.  Spoke with patient.  She reports that she is no longer having back pain.  She is really struggling with neuropathy in hands and feet.  She would like to see a neuropathy specialist.    She continues to smoke cigarettes and has no intention in quitting unless guarantee that neuropathy symptoms would be relieved 100%.    This message routed to provider for Neuro recommendations.

## 2025-03-04 ENCOUNTER — APPOINTMENT (OUTPATIENT)
Dept: ALLERGY | Facility: CLINIC | Age: 70
End: 2025-03-04
Payer: MEDICARE

## 2025-03-04 VITALS
HEIGHT: 63 IN | BODY MASS INDEX: 35.44 KG/M2 | OXYGEN SATURATION: 95 % | SYSTOLIC BLOOD PRESSURE: 108 MMHG | RESPIRATION RATE: 18 BRPM | TEMPERATURE: 97.3 F | WEIGHT: 200 LBS | HEART RATE: 67 BPM | DIASTOLIC BLOOD PRESSURE: 68 MMHG

## 2025-03-04 DIAGNOSIS — R06.2 WHEEZE: ICD-10-CM

## 2025-03-04 DIAGNOSIS — J41.0 SIMPLE CHRONIC BRONCHITIS (MULTI): Primary | ICD-10-CM

## 2025-03-04 DIAGNOSIS — J18.9 PNEUMONIA OF LOWER LOBE DUE TO INFECTIOUS ORGANISM, UNSPECIFIED LATERALITY: ICD-10-CM

## 2025-03-04 DIAGNOSIS — T36.95XA ADVERSE REACTION TO ANTIBIOTIC: ICD-10-CM

## 2025-03-04 DIAGNOSIS — Z00.00 ROUTINE GENERAL MEDICAL EXAMINATION AT HEALTH CARE FACILITY: ICD-10-CM

## 2025-03-04 PROCEDURE — 99204 OFFICE O/P NEW MOD 45 MIN: CPT | Performed by: ALLERGY & IMMUNOLOGY

## 2025-03-04 RX ORDER — IPRATROPIUM BROMIDE AND ALBUTEROL SULFATE 2.5; .5 MG/3ML; MG/3ML
3 SOLUTION RESPIRATORY (INHALATION)
Qty: 180 ML | Refills: 11 | Status: SHIPPED | OUTPATIENT
Start: 2025-03-04 | End: 2026-03-04

## 2025-03-04 RX ORDER — ALBUTEROL SULFATE 90 UG/1
2 INHALANT RESPIRATORY (INHALATION) EVERY 4 HOURS PRN
Qty: 18 G | Refills: 11 | Status: SHIPPED | OUTPATIENT
Start: 2025-03-04

## 2025-03-04 RX ORDER — IPRATROPIUM BROMIDE AND ALBUTEROL SULFATE 2.5; .5 MG/3ML; MG/3ML
3 SOLUTION RESPIRATORY (INHALATION) ONCE
Status: SHIPPED | OUTPATIENT
Start: 2025-03-04

## 2025-03-04 NOTE — PATIENT INSTRUCTIONS
For your adverse medication reactions I would recommend a graded challenge in my office for Azithromycin and Ciprofloxacin.  You have expressed that you do not want to proceed with this. If you need the medications urgently, a test dose could be tried under medical supervision or a desensitization protocol could be undergone in the hospital.  Because the reaction to Sulfa is noted as unknown allergy, a desensitization protocol would be recommended if it becomes absolutely necessary to have this medications. Testing for this medication is not standardized and may not produce reliable information.  Please follow up with Dr. Aguirre regarding your breathing for continue management.  If you are interested in additional assistance with this, I can also assist you with this.  Once lungs under having better control, then we could consider allergy skin tests for allergies to environment or food.

## 2025-03-04 NOTE — PROGRESS NOTES
Patient ID: Minoo Aguilera is a 70 y.o. female.     Chief Complaint: NPV referred by Dr. Aguirre  History Of Present Illness  Minoo Aguilera is a 70 y.o. female with PMx allergy and asthma presenting for consultation.         Food Allergy  Carrots-hives  Cashews-hives  Celery-hives    Has had carrot and it caused no problem and same thing with celery and cashew.      Asthma  Asthma diagnosed at age 28 yrs ago    Current medication: advair uses daily.  She feels her rescue inhaler works better. Uses once every other day.  Hospitalizations/ER visits in the last year: Does not recall  Oral steroid/systemic steroids in the last year: none this year  Triggers: smoking  Smoker or Smoke exposure: 40 yrs  Family history:    Family History   Problem Relation Name Age of Onset    Breast cancer Sister  72         Rhinoconjunctivitis  Patient reports she used to have allergies as a child and dandelions were her biggest trigger. She reports a lot of sneezing and eye watering.  She was tested in childhood. Takes no medication at this time for allergy.    Drug Allergy   Azithromycin-hallucinations reported. Patient reports several years ago  Ciprofloxacin causes similar reaction as with Azithromycin and this was also several years ago.  Sulfa-patient states she felt nauseated.  Has been awhile.    Insect Allergy   No    Infections  No history of frequent or recurrent infections      Review of Systems    Pertinent positives and negatives have been assessed in the HPI. All other systems have been reviewed and are negative except as noted in the HPI.    Allergies  Azithromycin, Carrot, Cashew nut, Celery (apium graveolens) (umbelliferae), Sulfa (sulfonamide antibiotics), and Ciprofloxacin    Past Medical History  She has no past medical history on file.    Family History  Family History   Problem Relation Name Age of Onset    Breast cancer Sister  72       Surgical History  She has a past surgical history that includes Appendectomy  (2014); Hysterectomy (2014); Tonsillectomy (2014); and Knee surgery (Left, 2022).    Social/Environmental History  She reports that she has been smoking cigarettes. She started smoking about 42 years ago. She has a 10.5 pack-year smoking history. She has never used smokeless tobacco. She reports that she does not currently use alcohol. She reports current drug use. Drug: Marijuana.    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    MEDICATIONS  Current Outpatient Medications on File Prior to Visit   Medication Sig Dispense Refill    albuterol 90 mcg/actuation inhaler Inhale 2 puffs every 6 hours if needed for wheezing or shortness of breath. 18 g 11    ARIPiprazole (Abilify) 5 mg tablet Take 1 tablet by mouth once daily 90 tablet 0    aspirin 81 mg EC tablet Take 1 tablet (81 mg) by mouth once daily.      Bacillus subtilis-inulin 1.5 billion cell-1 gram tablet,chewable       bisacodyl (Dulcolax) 10 mg suppository Insert 1 suppository (10 mg) into the rectum once daily as needed for constipation.      calcium carbonate 600 mg calcium (1,500 mg) tablet Take 1 tablet (1,500 mg) by mouth once daily.      celecoxib (CeleBREX) 200 mg capsule TAKE 1 CAPSULE BY MOUTH ONCE DAILY AS NEEDED 90 capsule 0    clopidogrel (Plavix) 75 mg tablet Take 1 tablet by mouth once daily 90 tablet 3    cyclopentolate (Cyclogyl) 1 % ophthalmic solution Administer 1 drop into the right eye every 8 hours if needed.      difluprednate (Durezol) 0.05 % ophthalmic solution Use  in the left eye every hour.      ferrous sulfate 325 (65 Fe) MG EC tablet Take 65 mg by mouth 3 times daily (morning, midday, late afternoon). Do not crush, chew, or split.      glucosamine-chondroit-vit C-Mn 500-400 mg capsule Take 1 capsule by mouth once daily.      levothyroxine  "(Synthroid, Levoxyl) 175 mcg tablet Take 1 tablet (175 mcg) by mouth once daily. 90 tablet 3    lisinopril 10 mg tablet Take 1 tablet by mouth once daily 90 tablet 3    metoprolol succinate XL (Toprol-XL) 50 mg 24 hr tablet Take 1 tablet by mouth once daily 90 tablet 3    omeprazole (PriLOSEC) 20 mg DR capsule Take 1 capsule (20 mg) by mouth once daily. 90 capsule 3    prednisoLONE acetate (Pred-Forte) 1 % ophthalmic suspension Administer 1 drop into the right eye every 2 hours.      pregabalin (Lyrica) 200 mg capsule TAKE 1 CAPSULE BY MOUTH THREE TIMES DAILY 180 capsule 0    promethazine (Phenergan) 25 mg tablet Take 1 tablet (25 mg) by mouth every 6 hours if needed for nausea or vomiting. 30 tablet 0    rosuvastatin (Crestor) 5 mg tablet Take 1 tablet (5 mg) by mouth once daily. 90 tablet 3    venlafaxine XR (Effexor-XR) 150 mg 24 hr capsule Take 1 capsule (150 mg) by mouth once daily. 90 capsule 3    fluticasone propion-salmeteroL (Advair Diskus) 500-50 mcg/dose diskus inhaler Inhale 1 puff 2 times a day. Rinse mouth with water after use to reduce aftertaste and incidence of candidiasis. Do not swallow. 60 each 11     No current facility-administered medications on file prior to visit.         Physical Exam  Visit Vitals  /68   Pulse 67   Temp 36.3 °C (97.3 °F) (Temporal)   Resp 18   Ht 1.6 m (5' 3\")   Wt 90.7 kg (200 lb)   SpO2 94%   BMI 35.43 kg/m²   OB Status Postmenopausal   Smoking Status Every Day   BSA 2.01 m²       Wt Readings from Last 1 Encounters:   03/04/25 90.7 kg (200 lb)       Physical Exam    General: Well appearing, no acute distress  Head: Normocephalic, atraumatic, neck supple without lymphadenopathy  Eyes: non-injected  Ears: Right TM with visible land marks, left tm with hyperemia and scarring.  Nose: No nasal crease, nares patent, minimal discharge  Throat: Normal dentition, no erythema, mildly dry mucous membranes  Heart: Regular rate and rhythm  Lungs: Decreased aeration with " squeekey wheeze, improved post duoneb with louder wheezing after the treatment.  Abdomen: Soft, non-tender, normal bowel sounds  Extremities: Moves all extremities symmetrically, no edema  Skin: No rashes/lesions  Psych: normal mood and affect    LAB RESULTS:  CBC:  Recent Labs     06/11/23  0824 06/10/23  0744 06/09/23  0826 06/07/23  0700 06/06/23  1023 11/07/22  0719 11/06/22  1515 09/21/22  0533 09/20/22  1052   WBC 15.3* 16.9* 15.8*   < > 14.0*   < > 15.5*   < > 12.6*   HGB 12.6 12.6 12.6   < > 12.4   < > 12.5   < > 12.7   HCT 40.6 39.3 39.4   < > 40.0   < > 39.7   < > 40.6   * 451* 457*   < > 410   < > 507*   < > 449   MCV 88 87 85   < > 88   < > 91   < > 88   EOSABS  --   --   --   --  0.27  --  0.14  --  0.17    < > = values in this interval not displayed.       CMP:  Recent Labs     06/11/23  0824 06/10/23  0744 06/09/23  0826 06/08/23  0544 06/06/23  1023 11/09/22  0934 11/08/22  0557   * 134* 135* 139   < > 135* 138   K 4.3 3.6 3.5 3.4*   < > 3.9 3.6    105 101 102   < > 100 102   CO2 20* 20* 22 26   < > 24 27   ANIONGAP 15 13 16 14   < > 15 13   BUN 31* 25* 26* 21   < > 20 26*   CREATININE 0.92 0.77 0.83 0.65   < > 0.78 0.80   MG  --   --   --  1.86  --  1.90 1.80    < > = values in this interval not displayed.     Recent Labs     06/06/23  1023 11/09/22  1436 11/07/22  0719 11/06/22  1515   ALBUMIN 4.5 3.7 3.9 4.5   ALKPHOS 101 74  --  101   ALT 18 18  --  22   AST 20 9  --  30   BILITOT 0.7 0.6  --  0.7     Recent Labs     06/06/23  1023 11/06/22  1515 09/20/22  1052   EOSABS 0.27 0.14 0.17     COAG:   Recent Labs     06/06/23  1023 11/06/22  1515 09/20/22  1052   INR 1.1 1.0 1.1       HEME/ENDO:  Recent Labs     11/20/24  1310 05/24/24  1319 11/20/23  1126 06/08/23  0544 06/07/23  0700 09/20/22  1052   TSH  --   --   --  18.48* 22.09* 4.76*   HGBA1C 6.3 6.5 6.7*  --  6.5* 6.3*         Assessment/Plan   Minoo is a 69 yo woman with adverse reaction to antibiotics including  clindamycin and Zithromax. There is a reported allergy to sulfa without known details by the patient.  For your adverse medication reactions I would recommend a graded challenge in my office for Azithromycin and Ciprofloxacin.  Patient declines this procedure.. If you need the medications urgently, a test dose could be tried under medical supervision or a desensitization protocol could be undergone in the hospital.  Because the reaction to Sulfa is noted as unknown allergy, a desensitization protocol would be recommended if it becomes absolutely necessary to have this medications. Testing for this medication is not standardized and may not produce reliable information.  Patient is a current smoker and is having respiratory symptoms at this time improved with Duoneb treatment.  I have recommended follow up with Dr. Aguirre for this issue for continued monitoring.  Once lungs under having better control, then we could consider allergy skin tests for allergies to environment or food.      Jenna Larkin, DO

## 2025-03-06 DIAGNOSIS — M54.50 CHRONIC LOW BACK PAIN WITHOUT SCIATICA, UNSPECIFIED BACK PAIN LATERALITY: ICD-10-CM

## 2025-03-06 DIAGNOSIS — G89.29 CHRONIC LOW BACK PAIN WITHOUT SCIATICA, UNSPECIFIED BACK PAIN LATERALITY: ICD-10-CM

## 2025-03-06 DIAGNOSIS — E11.42 TYPE 2 DIABETES MELLITUS WITH DIABETIC POLYNEUROPATHY, WITHOUT LONG-TERM CURRENT USE OF INSULIN: ICD-10-CM

## 2025-03-06 RX ORDER — PREGABALIN 200 MG/1
200 CAPSULE ORAL 3 TIMES DAILY
Qty: 180 CAPSULE | Refills: 0 | Status: SHIPPED | OUTPATIENT
Start: 2025-03-06

## 2025-03-06 RX ORDER — PREGABALIN 200 MG/1
200 CAPSULE ORAL 3 TIMES DAILY
Qty: 180 CAPSULE | Refills: 0 | Status: SHIPPED | OUTPATIENT
Start: 2025-03-06 | End: 2025-03-06 | Stop reason: SDUPTHER

## 2025-03-06 NOTE — TELEPHONE ENCOUNTER
Rx Refill Request Telephone Encounter    Name:  Minoo Aguilera  :  [id number]  Medication Name:  pregabalin (Lyrica) 200 mg capsule     Specific Pharmacy location:  [facility] [address] [place], [place] [id number] [address]  [id number]  [address] [address]  Phone: [phone]  Fax: [phone]  NANCY #: --

## 2025-03-10 DIAGNOSIS — F32.A DEPRESSION, UNSPECIFIED DEPRESSION TYPE: ICD-10-CM

## 2025-03-10 DIAGNOSIS — K21.9 GASTROESOPHAGEAL REFLUX DISEASE, UNSPECIFIED WHETHER ESOPHAGITIS PRESENT: ICD-10-CM

## 2025-03-10 RX ORDER — OMEPRAZOLE 20 MG/1
20 CAPSULE, DELAYED RELEASE ORAL DAILY
Qty: 90 CAPSULE | Refills: 3 | Status: SHIPPED | OUTPATIENT
Start: 2025-03-10

## 2025-03-10 RX ORDER — ARIPIPRAZOLE 5 MG/1
5 TABLET ORAL DAILY
Qty: 90 TABLET | Refills: 3 | Status: SHIPPED | OUTPATIENT
Start: 2025-03-10

## 2025-03-11 ENCOUNTER — PATIENT OUTREACH (OUTPATIENT)
Dept: PRIMARY CARE | Facility: CLINIC | Age: 70
End: 2025-03-11
Payer: MEDICARE

## 2025-03-11 DIAGNOSIS — E11.42 TYPE 2 DIABETES MELLITUS WITH DIABETIC POLYNEUROPATHY, WITHOUT LONG-TERM CURRENT USE OF INSULIN: ICD-10-CM

## 2025-03-11 DIAGNOSIS — M06.9 RHEUMATOID ARTHRITIS INVOLVING MULTIPLE SITES, UNSPECIFIED WHETHER RHEUMATOID FACTOR PRESENT (MULTI): ICD-10-CM

## 2025-03-11 DIAGNOSIS — G62.9 NEUROPATHY: ICD-10-CM

## 2025-03-11 NOTE — PROGRESS NOTES
"Chart reviewed and RN CM monthly outreach call placed.    Spoke with Minoo.    She reports that her feet have been \"really hurting\".  The pain seems to increase at bedtime. She takes Celebrex every morning and last evening took one at bedtime with pregabalin.  She reports that this seemed to help with neuropathy related pain.  She asked that I get Dr. Aguirre's thoughts on increasing dose to twice daily.    Pt was seen by neurology specialist yesterday and is scheduled for EMG on 3/18/24.  She also has an order for lab studies which she has not completed yet. She is to follow up in 6 weeks.    She did not mention increasing celebrex with neuro.    "

## 2025-03-31 DIAGNOSIS — F32.A DEPRESSION, UNSPECIFIED DEPRESSION TYPE: ICD-10-CM

## 2025-03-31 RX ORDER — VENLAFAXINE HYDROCHLORIDE 150 MG/1
150 CAPSULE, EXTENDED RELEASE ORAL DAILY
Qty: 30 CAPSULE | Refills: 0 | Status: SHIPPED | OUTPATIENT
Start: 2025-03-31

## 2025-04-18 ENCOUNTER — PATIENT OUTREACH (OUTPATIENT)
Dept: PRIMARY CARE | Facility: CLINIC | Age: 70
End: 2025-04-18
Payer: MEDICARE

## 2025-04-18 DIAGNOSIS — M06.9 RHEUMATOID ARTHRITIS INVOLVING MULTIPLE SITES, UNSPECIFIED WHETHER RHEUMATOID FACTOR PRESENT (MULTI): ICD-10-CM

## 2025-04-18 DIAGNOSIS — F32.A DEPRESSION, UNSPECIFIED DEPRESSION TYPE: ICD-10-CM

## 2025-04-18 DIAGNOSIS — G62.9 NEUROPATHY: ICD-10-CM

## 2025-04-18 DIAGNOSIS — E11.42 TYPE 2 DIABETES MELLITUS WITH DIABETIC POLYNEUROPATHY, WITHOUT LONG-TERM CURRENT USE OF INSULIN: ICD-10-CM

## 2025-04-18 RX ORDER — PERPHENAZINE 16 MG
600 TABLET ORAL DAILY
COMMUNITY

## 2025-04-18 NOTE — PROGRESS NOTES
Chart reviewed and RN CM monthly outreach call placed.  Spoke with Minoo.  She reports that she is doing very good.  She denies chest pain, SOB, or Falls.    She reports that she was prescribed alpha-lipoic acid 600mg daily by neuro and this has helped significantly with neuropathy pain.    She is mostly compliant with diabetic diet with some cravings at bedtime.  We did discuss healthy snack options.  Denies further questions or concerns at this time.

## 2025-04-29 DIAGNOSIS — F32.A DEPRESSION, UNSPECIFIED DEPRESSION TYPE: ICD-10-CM

## 2025-04-29 RX ORDER — VENLAFAXINE HYDROCHLORIDE 150 MG/1
150 CAPSULE, EXTENDED RELEASE ORAL DAILY
Qty: 30 CAPSULE | Refills: 0 | Status: SHIPPED | OUTPATIENT
Start: 2025-04-29

## 2025-05-01 DIAGNOSIS — E11.42 DIABETIC PERIPHERAL NEUROPATHY (MULTI): ICD-10-CM

## 2025-05-01 RX ORDER — CELECOXIB 200 MG/1
200 CAPSULE ORAL DAILY PRN
Qty: 90 CAPSULE | Refills: 0 | Status: SHIPPED | OUTPATIENT
Start: 2025-05-01

## 2025-05-20 ENCOUNTER — PATIENT OUTREACH (OUTPATIENT)
Dept: PRIMARY CARE | Facility: CLINIC | Age: 70
End: 2025-05-20
Payer: MEDICARE

## 2025-05-20 DIAGNOSIS — G62.9 NEUROPATHY: ICD-10-CM

## 2025-05-20 DIAGNOSIS — E11.42 TYPE 2 DIABETES MELLITUS WITH DIABETIC POLYNEUROPATHY, WITHOUT LONG-TERM CURRENT USE OF INSULIN: ICD-10-CM

## 2025-05-20 NOTE — PROGRESS NOTES
Care Management Monthly Outreach  Chart review completed    Last Office Visit: 1/6/2025  Next Office Visit: Visit date not found   APC: n/a    Has patient been to ER/Urgent Care since last outreach? No    Outreach attempted: unable to leave message on voicemail..    Marybeth Mchugh RN

## 2025-05-26 DIAGNOSIS — F32.A DEPRESSION, UNSPECIFIED DEPRESSION TYPE: ICD-10-CM

## 2025-05-27 ENCOUNTER — PATIENT OUTREACH (OUTPATIENT)
Dept: PRIMARY CARE | Facility: CLINIC | Age: 70
End: 2025-05-27
Payer: MEDICARE

## 2025-05-27 DIAGNOSIS — G62.9 NEUROPATHY: ICD-10-CM

## 2025-05-27 DIAGNOSIS — F32.A DEPRESSION, UNSPECIFIED DEPRESSION TYPE: ICD-10-CM

## 2025-05-27 RX ORDER — VENLAFAXINE HYDROCHLORIDE 150 MG/1
150 CAPSULE, EXTENDED RELEASE ORAL DAILY
Qty: 30 CAPSULE | Refills: 0 | Status: SHIPPED | OUTPATIENT
Start: 2025-05-27

## 2025-05-27 NOTE — PROGRESS NOTES
Care Management Monthly Outreach  Chart review completed  Confirmation of at least 2 patient identifiers  Change in insurance? No    Has patient been to ER/Urgent Care since last outreach? No    Last Office Visit with PCP: 1/6/2025   Next Office Visit with PCP: 11/20/2025   APC Collaboration: n/a    Chronic Conditions and Outreach Summary:   Depression, unspecified depression type    Neuropathy    Patient reports that she is doing very well.  She denies difficulty with obtaining food and medications.  She currently drives herself to appointments.  She continues to take alpha-lipoic acid and reports that it has drastically improved neuropathy symptoms.     Medications:   Are there medication changes since last visit? No  Refills needed? No    Social Drivers of Health: Addressed today  Care Gaps Addressed? Deferred  Care Plan addressed: Yes    Upcoming Appointments:   Future Appointments       Date / Time Provider Department Dept Phone    11/20/2025 1:40 PM (Arrive by 1:25 PM) Jf Aguirre MD [facility] [phone]          Blood Pressures Reviewed  BP Readings from Last 3 Encounters:   03/04/25 108/68   01/06/25 118/77   11/20/24 137/80     Labs Reviewed:  Lab Results   Component Value Date    CREATININE 0.92 06/11/2023    GLUCOSE 118 (H) 06/11/2023    ALKPHOS 101 06/06/2023    K 4.3 06/11/2023    PROT 7.9 06/06/2023     (L) 06/11/2023    CALCIUM 10.0 06/11/2023    AST 20 06/06/2023    ALT 18 06/06/2023    BUN 31 (H) 06/11/2023    MG 1.86 06/08/2023    PHOS 3.6 11/07/2022    GFRF 67 06/11/2023     Lab Results   Component Value Date    TRIG 323 (H) 06/07/2023    CHOL 169 06/07/2023    HDL 36.4 (A) 06/07/2023     Lab Results   Component Value Date    HGBA1C 6.4 (H) 03/18/2025    HGBA1C 6.3 11/20/2024    HGBA1C 6.5 05/24/2024     Lab Results   Component Value Date    WBC 15.3 (H) 06/11/2023    RBC 4.59 06/11/2023    HGB 12.6 06/11/2023     (H) 06/11/2023   No other concerns at this  time.  Agreeable to continue monthly outreaches.  Encouraged to call if questions or concerns arise.    Marybeth Mchugh RN

## 2025-05-29 DIAGNOSIS — E03.9 HYPOTHYROIDISM, UNSPECIFIED TYPE: ICD-10-CM

## 2025-05-29 RX ORDER — LEVOTHYROXINE SODIUM 175 UG/1
175 TABLET ORAL DAILY
Qty: 90 TABLET | Refills: 0 | Status: SHIPPED | OUTPATIENT
Start: 2025-05-29

## 2025-06-05 ENCOUNTER — TELEPHONE (OUTPATIENT)
Dept: PRIMARY CARE | Facility: CLINIC | Age: 70
End: 2025-06-05
Payer: MEDICARE

## 2025-06-05 DIAGNOSIS — M19.90 OSTEOARTHRITIS, UNSPECIFIED OSTEOARTHRITIS TYPE, UNSPECIFIED SITE: Primary | ICD-10-CM

## 2025-06-05 DIAGNOSIS — M06.9 RHEUMATOID ARTHRITIS INVOLVING MULTIPLE SITES, UNSPECIFIED WHETHER RHEUMATOID FACTOR PRESENT (MULTI): ICD-10-CM

## 2025-06-05 RX ORDER — PREDNISONE 50 MG/1
50 TABLET ORAL DAILY
Qty: 5 TABLET | Refills: 0 | Status: SHIPPED | OUTPATIENT
Start: 2025-06-05 | End: 2025-06-10

## 2025-06-05 NOTE — TELEPHONE ENCOUNTER
Pt called stating she is having an Arthritis flare up and it hurts., she is asking would you send in Prednisone to [facility]  Please Advise

## 2025-06-12 ENCOUNTER — APPOINTMENT (OUTPATIENT)
Dept: NEUROLOGY | Facility: CLINIC | Age: 70
End: 2025-06-12
Payer: MEDICARE

## 2025-06-24 ENCOUNTER — PATIENT OUTREACH (OUTPATIENT)
Dept: PRIMARY CARE | Facility: CLINIC | Age: 70
End: 2025-06-24
Payer: MEDICARE

## 2025-06-24 DIAGNOSIS — G62.9 NEUROPATHY: ICD-10-CM

## 2025-06-24 DIAGNOSIS — M19.90 OSTEOARTHRITIS, UNSPECIFIED OSTEOARTHRITIS TYPE, UNSPECIFIED SITE: ICD-10-CM

## 2025-06-24 RX ORDER — PREDNISONE 50 MG/1
50 TABLET ORAL DAILY
Qty: 5 TABLET | Refills: 0 | Status: SHIPPED | OUTPATIENT
Start: 2025-06-24 | End: 2025-06-29

## 2025-06-24 NOTE — PROGRESS NOTES
Care Management Monthly Outreach  Chart review completed  Confirmation of at least 2 patient identifiers  Change in insurance? No    Has patient been to ER/Urgent Care since last outreach? No    Last Office Visit with PCP: 1/6/2025   Next Office Visit with PCP: 11/20/2025   APC Collaboration: n/a    Chronic Conditions and Outreach Summary:   Neuropathy    Osteoarthritis, unspecified osteoarthritis type, unspecified site    Patient reports that neuropathy pain is manageable at this time with pregambalin and alpha lipoic acid.    She was ordered prednisone for arthritis flare up.  Patient reports that this is still a problem reporting pain in her hands, hip and occasionally bilateral feet (along with neuropathy pain).  Message sent to clerical staff to contact patient to schedule appt with PCP per communication on 6/5/25 from provider.    Patient denies respiratory distress, SOB, or increased wheezing.      Medications:   Are there medication changes since last visit? Yes - Patient was on a course of prednisone for arthritis pain  Refills needed? No    Social Drivers of Health: Deferred  Care Gaps Addressed? Deferred  Care Plan addressed: Yes    Upcoming Appointments:   Future Appointments       Date / Time Provider Department Dept Phone    11/20/2025 1:40 PM (Arrive by 1:25 PM) Jf Aguirre MD [facility] [phone]          Blood Pressures Reviewed  BP Readings from Last 3 Encounters:   03/04/25 108/68   01/06/25 118/77   11/20/24 137/80     Labs Reviewed:  Lab Results   Component Value Date    CREATININE 0.92 06/11/2023    GLUCOSE 118 (H) 06/11/2023    ALKPHOS 101 06/06/2023    K 4.3 06/11/2023    PROT 7.9 06/06/2023     (L) 06/11/2023    CALCIUM 10.0 06/11/2023    AST 20 06/06/2023    ALT 18 06/06/2023    BUN 31 (H) 06/11/2023    MG 1.86 06/08/2023    PHOS 3.6 11/07/2022    GFRF 67 06/11/2023     Lab Results   Component Value Date    TRIG 323 (H) 06/07/2023    CHOL 169 06/07/2023    HDL  36.4 (A) 06/07/2023     Lab Results   Component Value Date    HGBA1C 6.4 (H) 03/18/2025    HGBA1C 6.3 11/20/2024    HGBA1C 6.5 05/24/2024     Lab Results   Component Value Date    WBC 15.3 (H) 06/11/2023    RBC 4.59 06/11/2023    HGB 12.6 06/11/2023     (H) 06/11/2023   No other concerns at this time.  Agreeable to continue monthly outreaches.  Encouraged to call if questions or concerns arise.    Marybeth Mchugh RN

## 2025-06-26 DIAGNOSIS — G89.29 CHRONIC LOW BACK PAIN WITHOUT SCIATICA, UNSPECIFIED BACK PAIN LATERALITY: ICD-10-CM

## 2025-06-26 DIAGNOSIS — E11.42 TYPE 2 DIABETES MELLITUS WITH DIABETIC POLYNEUROPATHY, WITHOUT LONG-TERM CURRENT USE OF INSULIN: ICD-10-CM

## 2025-06-26 DIAGNOSIS — M54.50 CHRONIC LOW BACK PAIN WITHOUT SCIATICA, UNSPECIFIED BACK PAIN LATERALITY: ICD-10-CM

## 2025-06-26 DIAGNOSIS — E03.9 HYPOTHYROIDISM, UNSPECIFIED TYPE: ICD-10-CM

## 2025-06-26 RX ORDER — PREGABALIN 200 MG/1
200 CAPSULE ORAL 3 TIMES DAILY
Qty: 180 CAPSULE | Refills: 0 | Status: SHIPPED | OUTPATIENT
Start: 2025-06-26

## 2025-06-26 RX ORDER — LEVOTHYROXINE SODIUM 175 UG/1
175 TABLET ORAL DAILY
Qty: 90 TABLET | Refills: 3 | Status: SHIPPED | OUTPATIENT
Start: 2025-06-26

## 2025-06-28 DIAGNOSIS — F32.A DEPRESSION, UNSPECIFIED DEPRESSION TYPE: ICD-10-CM

## 2025-06-30 DIAGNOSIS — M54.50 CHRONIC LOW BACK PAIN WITHOUT SCIATICA, UNSPECIFIED BACK PAIN LATERALITY: ICD-10-CM

## 2025-06-30 DIAGNOSIS — G89.29 CHRONIC LOW BACK PAIN WITHOUT SCIATICA, UNSPECIFIED BACK PAIN LATERALITY: ICD-10-CM

## 2025-06-30 DIAGNOSIS — E11.42 TYPE 2 DIABETES MELLITUS WITH DIABETIC POLYNEUROPATHY, WITHOUT LONG-TERM CURRENT USE OF INSULIN: ICD-10-CM

## 2025-06-30 RX ORDER — PREGABALIN 200 MG/1
200 CAPSULE ORAL 3 TIMES DAILY
Qty: 180 CAPSULE | Refills: 0 | Status: SHIPPED | OUTPATIENT
Start: 2025-06-30

## 2025-06-30 RX ORDER — VENLAFAXINE HYDROCHLORIDE 150 MG/1
150 CAPSULE, EXTENDED RELEASE ORAL DAILY
Qty: 90 CAPSULE | Refills: 3 | Status: SHIPPED | OUTPATIENT
Start: 2025-06-30

## 2025-07-01 ENCOUNTER — TELEPHONE (OUTPATIENT)
Dept: PRIMARY CARE | Facility: CLINIC | Age: 70
End: 2025-07-01
Payer: MEDICARE

## 2025-07-01 DIAGNOSIS — G89.29 CHRONIC LOW BACK PAIN WITHOUT SCIATICA, UNSPECIFIED BACK PAIN LATERALITY: ICD-10-CM

## 2025-07-01 DIAGNOSIS — M54.50 CHRONIC LOW BACK PAIN WITHOUT SCIATICA, UNSPECIFIED BACK PAIN LATERALITY: ICD-10-CM

## 2025-07-01 DIAGNOSIS — E11.42 TYPE 2 DIABETES MELLITUS WITH DIABETIC POLYNEUROPATHY, WITHOUT LONG-TERM CURRENT USE OF INSULIN: ICD-10-CM

## 2025-07-01 NOTE — TELEPHONE ENCOUNTER
[facility] pharmacy called to clarify the day supply for her Pregablin. It was written 1 po tid, with a qty of 180 pills. Was this supposed to be a 60 day or 90 day supply? Please advise.    [phone]

## 2025-07-11 ENCOUNTER — HOSPITAL ENCOUNTER (EMERGENCY)
Age: 70
Discharge: HOME | End: 2025-07-12
Attending: EMERGENCY MEDICINE
Payer: MEDICARE

## 2025-07-11 ENCOUNTER — APPOINTMENT (OUTPATIENT)
Dept: CT IMAGING | Age: 70
End: 2025-07-11
Payer: MEDICARE

## 2025-07-11 VITALS
BODY MASS INDEX: 31.65 KG/M2 | SYSTOLIC BLOOD PRESSURE: 166 MMHG | OXYGEN SATURATION: 93 % | WEIGHT: 190 LBS | DIASTOLIC BLOOD PRESSURE: 84 MMHG | TEMPERATURE: 98.7 F | HEIGHT: 65 IN | HEART RATE: 69 BPM | RESPIRATION RATE: 18 BRPM

## 2025-07-11 DIAGNOSIS — K21.9 GASTROESOPHAGEAL REFLUX DISEASE WITHOUT ESOPHAGITIS: ICD-10-CM

## 2025-07-11 DIAGNOSIS — K57.32 DIVERTICULITIS OF COLON: Primary | ICD-10-CM

## 2025-07-11 LAB
ALBUMIN SERPL-MCNC: 4 G/DL (ref 3.5–4.6)
ALP SERPL-CCNC: 80 U/L (ref 40–130)
ALT SERPL-CCNC: 16 U/L (ref 0–33)
AMYLASE SERPL-CCNC: 64 U/L (ref 22–93)
ANION GAP SERPL CALCULATED.3IONS-SCNC: 11 MEQ/L (ref 9–15)
AST SERPL-CCNC: 21 U/L (ref 0–35)
BASOPHILS # BLD: 0 K/UL (ref 0–0.1)
BASOPHILS NFR BLD: 0.3 % (ref 0.1–1.2)
BILIRUB SERPL-MCNC: 0.4 MG/DL (ref 0.2–0.7)
BUN SERPL-MCNC: 17 MG/DL (ref 8–23)
CALCIUM SERPL-MCNC: 10.1 MG/DL (ref 8.5–9.9)
CHLORIDE SERPL-SCNC: 106 MEQ/L (ref 95–107)
CO2 SERPL-SCNC: 27 MEQ/L (ref 20–31)
CREAT SERPL-MCNC: 0.6 MG/DL (ref 0.5–0.9)
EOSINOPHIL # BLD: 0.3 K/UL (ref 0–0.4)
EOSINOPHIL NFR BLD: 2.6 % (ref 0.7–5.8)
ERYTHROCYTE [DISTWIDTH] IN BLOOD BY AUTOMATED COUNT: 14.1 % (ref 11.7–14.4)
GLOBULIN SER CALC-MCNC: 2.6 G/DL (ref 2.3–3.5)
GLUCOSE SERPL-MCNC: 113 MG/DL (ref 70–99)
HCT VFR BLD AUTO: 38.2 % (ref 37–47)
HGB BLD-MCNC: 11.9 G/DL (ref 11.2–15.7)
IMM GRANULOCYTES # BLD: 0 K/UL
IMM GRANULOCYTES NFR BLD: 0.4 %
LACTATE BLDV-SCNC: 1 MMOL/L (ref 0.5–2.2)
LIPASE SERPL-CCNC: 43 U/L (ref 12–95)
LYMPHOCYTES # BLD: 1.9 K/UL (ref 1.2–3.7)
LYMPHOCYTES NFR BLD: 17.7 %
MCH RBC QN AUTO: 28.5 PG (ref 25.6–32.2)
MCHC RBC AUTO-ENTMCNC: 31.2 % (ref 32.2–35.5)
MCV RBC AUTO: 91.4 FL (ref 79.4–94.8)
MONOCYTES # BLD: 0.8 K/UL (ref 0.2–0.9)
MONOCYTES NFR BLD: 7.4 % (ref 4.7–12.5)
NEUTROPHILS # BLD: 7.8 K/UL (ref 1.6–6.1)
NEUTS SEG NFR BLD: 71.6 % (ref 34–71.1)
PLATELET # BLD AUTO: 282 K/UL (ref 182–369)
POTASSIUM SERPL-SCNC: 4 MEQ/L (ref 3.4–4.9)
PROT SERPL-MCNC: 6.6 G/DL (ref 6.3–8)
RBC # BLD AUTO: 4.18 M/UL (ref 3.93–5.22)
SODIUM SERPL-SCNC: 144 MEQ/L (ref 135–144)
WBC # BLD AUTO: 10.8 K/UL (ref 4–10)

## 2025-07-11 PROCEDURE — 83605 ASSAY OF LACTIC ACID: CPT

## 2025-07-11 PROCEDURE — 82150 ASSAY OF AMYLASE: CPT

## 2025-07-11 PROCEDURE — 36415 COLL VENOUS BLD VENIPUNCTURE: CPT

## 2025-07-11 PROCEDURE — 83690 ASSAY OF LIPASE: CPT

## 2025-07-11 PROCEDURE — 6360000004 HC RX CONTRAST MEDICATION: Performed by: EMERGENCY MEDICINE

## 2025-07-11 PROCEDURE — 85025 COMPLETE CBC W/AUTO DIFF WBC: CPT

## 2025-07-11 PROCEDURE — 99285 EMERGENCY DEPT VISIT HI MDM: CPT

## 2025-07-11 PROCEDURE — 80053 COMPREHEN METABOLIC PANEL: CPT

## 2025-07-11 PROCEDURE — 6370000000 HC RX 637 (ALT 250 FOR IP): Performed by: EMERGENCY MEDICINE

## 2025-07-11 PROCEDURE — 74177 CT ABD & PELVIS W/CONTRAST: CPT

## 2025-07-11 RX ORDER — PROMETHAZINE HYDROCHLORIDE 25 MG/1
25 TABLET ORAL EVERY 6 HOURS PRN
Qty: 30 TABLET | Refills: 0 | Status: SHIPPED | OUTPATIENT
Start: 2025-07-11

## 2025-07-11 RX ORDER — IOPAMIDOL 755 MG/ML
75 INJECTION, SOLUTION INTRAVASCULAR
Status: COMPLETED | OUTPATIENT
Start: 2025-07-11 | End: 2025-07-11

## 2025-07-11 RX ADMIN — IOPAMIDOL 75 ML: 755 INJECTION, SOLUTION INTRAVENOUS at 23:49

## 2025-07-11 RX ADMIN — LIDOCAINE HYDROCHLORIDE: 20 SOLUTION ORAL at 21:38

## 2025-07-11 ASSESSMENT — ENCOUNTER SYMPTOMS
VOMITING: 0
EYE DISCHARGE: 0
NAUSEA: 1
COUGH: 0
ABDOMINAL PAIN: 1
SORE THROAT: 0
SHORTNESS OF BREATH: 0
BACK PAIN: 0
DIARRHEA: 1
EYE REDNESS: 0

## 2025-07-11 ASSESSMENT — PAIN - FUNCTIONAL ASSESSMENT: PAIN_FUNCTIONAL_ASSESSMENT: NONE - DENIES PAIN

## 2025-07-12 DIAGNOSIS — I10 BENIGN ESSENTIAL HTN: ICD-10-CM

## 2025-07-12 PROCEDURE — 6360000002 HC RX W HCPCS: Performed by: EMERGENCY MEDICINE

## 2025-07-12 PROCEDURE — 6370000000 HC RX 637 (ALT 250 FOR IP): Performed by: EMERGENCY MEDICINE

## 2025-07-12 PROCEDURE — 96375 TX/PRO/DX INJ NEW DRUG ADDON: CPT

## 2025-07-12 PROCEDURE — 96374 THER/PROPH/DIAG INJ IV PUSH: CPT

## 2025-07-12 RX ORDER — CIPROFLOXACIN 500 MG/1
500 TABLET, FILM COATED ORAL ONCE
Status: COMPLETED | OUTPATIENT
Start: 2025-07-12 | End: 2025-07-12

## 2025-07-12 RX ORDER — METRONIDAZOLE 500 MG/1
500 TABLET ORAL ONCE
Status: COMPLETED | OUTPATIENT
Start: 2025-07-12 | End: 2025-07-12

## 2025-07-12 RX ORDER — MORPHINE SULFATE 2 MG/ML
2 INJECTION, SOLUTION INTRAMUSCULAR; INTRAVENOUS ONCE
Refills: 0 | Status: COMPLETED | OUTPATIENT
Start: 2025-07-12 | End: 2025-07-12

## 2025-07-12 RX ORDER — ONDANSETRON 2 MG/ML
4 INJECTION INTRAMUSCULAR; INTRAVENOUS ONCE
Status: COMPLETED | OUTPATIENT
Start: 2025-07-12 | End: 2025-07-12

## 2025-07-12 RX ORDER — METRONIDAZOLE 500 MG/1
500 TABLET ORAL 2 TIMES DAILY
Qty: 14 TABLET | Refills: 0 | Status: ON HOLD | OUTPATIENT
Start: 2025-07-12 | End: 2025-07-19

## 2025-07-12 RX ORDER — CIPROFLOXACIN 500 MG/1
500 TABLET, FILM COATED ORAL 2 TIMES DAILY
Qty: 14 TABLET | Refills: 0 | Status: ON HOLD | OUTPATIENT
Start: 2025-07-12 | End: 2025-07-19

## 2025-07-12 RX ORDER — ONDANSETRON 4 MG/1
4 TABLET, ORALLY DISINTEGRATING ORAL 3 TIMES DAILY PRN
Qty: 21 TABLET | Refills: 0 | Status: ON HOLD | OUTPATIENT
Start: 2025-07-12

## 2025-07-12 RX ADMIN — METRONIDAZOLE 500 MG: 500 TABLET ORAL at 01:02

## 2025-07-12 RX ADMIN — CIPROFLOXACIN HYDROCHLORIDE 500 MG: 500 TABLET, FILM COATED ORAL at 01:02

## 2025-07-12 RX ADMIN — ONDANSETRON 4 MG: 2 INJECTION, SOLUTION INTRAMUSCULAR; INTRAVENOUS at 01:03

## 2025-07-12 RX ADMIN — MORPHINE SULFATE 2 MG: 2 INJECTION, SOLUTION INTRAMUSCULAR; INTRAVENOUS at 01:03

## 2025-07-12 ASSESSMENT — PAIN SCALES - GENERAL: PAINLEVEL_OUTOF10: 5

## 2025-07-12 NOTE — ED PROVIDER NOTES
Thyroid disease     hypothyroid         SURGICAL HISTORY       Past Surgical History:   Procedure Laterality Date    HYSTERECTOMY (CERVIX STATUS UNKNOWN)      KNEE SURGERY      left knee total replacement         CURRENT MEDICATIONS       Discharge Medication List as of 7/12/2025  1:01 AM        CONTINUE these medications which have NOT CHANGED    Details   naproxen (NAPROSYN) 500 MG tablet Take 1 tablet by mouth 2 times daily for 20 doses, Disp-20 tablet, R-0Print      nicotine (NICODERM CQ) 14 MG/24HR Place 1 patch onto the skin daily, Disp-14 patch, R-0Normal      nicotine (NICODERM CQ) 7 MG/24HR Place 1 patch onto the skin every 24 hours Start taking this 7 mg low dose patch after you complete 14 days of medium dose patch 14mg daily, Disp-30 patch, R-0Normal      guaiFENesin (MUCINEX) 600 MG extended release tablet Take 1 tablet by mouth 3 times daily as needed for Congestion, Disp-21 tablet, R-0Normal      albuterol sulfate HFA (VENTOLIN HFA) 108 (90 Base) MCG/ACT inhaler Inhale 2 puffs into the lungs 4 times daily as needed for Wheezing, Disp-54 g, R-1Normal      LORazepam (ATIVAN) 1 MG tablet Take 0.5 tablets by mouth every 8 hours as needed for Anxiety.Historical Med      pregabalin (LYRICA) 150 MG capsule Take 1 capsule by mouth daily., Disp-60 capsule, R-3Historical Med      vitamin B-12 (CYANOCOBALAMIN) 1000 MCG tablet Take 5,000 mcg by mouth dailyHistorical Med      Coenzyme Q10 (CO Q 10) 100 MG CAPS Take 1 tablet by mouth, DAWHistorical Med      ferrous sulfate (IRON 325) 325 (65 Fe) MG tablet Take 325 mg by mouth daily (with breakfast)Historical Med      lisinopril (PRINIVIL;ZESTRIL) 5 MG tablet Take 1 tablet by mouth dailyHistorical Med      aspirin 81 MG chewable tablet Take 1 tablet by mouth dailyHistorical Med      clopidogrel (PLAVIX) 75 MG tablet Take 1 tablet by mouth dailyHistorical Med      rosuvastatin (CRESTOR) 5 MG tablet Take 5 mg by mouth dailyHistorical Med      metoprolol succinate  EXAM    (up to 7 for level 4, 8 or more for level 5)     ED Triage Vitals [07/11/25 2050]   BP Systolic BP Percentile Diastolic BP Percentile Temp Temp Source Pulse Respirations SpO2   (!) 166/84 -- -- 98.7 °F (37.1 °C) Oral 69 18 93 %      Height Weight - Scale         1.66 m (5' 5.35\") 86.2 kg (190 lb)             Physical Exam  Vitals and nursing note reviewed.   Constitutional:       Appearance: Normal appearance.   HENT:      Head: Normocephalic and atraumatic.      Right Ear: Tympanic membrane normal.      Left Ear: Tympanic membrane normal.      Nose: Nose normal.      Mouth/Throat:      Mouth: Mucous membranes are moist.      Pharynx: Oropharynx is clear.   Eyes:      General: Lids are normal.      Extraocular Movements: Extraocular movements intact.      Conjunctiva/sclera: Conjunctivae normal.      Pupils: Pupils are equal, round, and reactive to light.   Cardiovascular:      Rate and Rhythm: Normal rate and regular rhythm.      Pulses: Normal pulses.      Heart sounds: Normal heart sounds.   Pulmonary:      Effort: Pulmonary effort is normal.      Breath sounds: Normal breath sounds.   Abdominal:      General: Abdomen is flat. Bowel sounds are normal.      Palpations: Abdomen is soft.      Tenderness: There is abdominal tenderness. There is no guarding or rebound.   Musculoskeletal:         General: Normal range of motion.      Cervical back: Full passive range of motion without pain, normal range of motion and neck supple.   Skin:     General: Skin is warm.      Capillary Refill: Capillary refill takes less than 2 seconds.   Neurological:      General: No focal deficit present.      Mental Status: She is alert and oriented to person, place, and time.      Deep Tendon Reflexes: Reflexes are normal and symmetric.   Psychiatric:         Attention and Perception: Attention and perception normal.         Mood and Affect: Mood normal.         Behavior: Behavior normal. Behavior is cooperative.       DIAGNOSTIC

## 2025-07-14 ENCOUNTER — PATIENT OUTREACH (OUTPATIENT)
Dept: PRIMARY CARE | Facility: CLINIC | Age: 70
End: 2025-07-14
Payer: MEDICARE

## 2025-07-14 DIAGNOSIS — K57.90 DIVERTICULOSIS: ICD-10-CM

## 2025-07-14 DIAGNOSIS — G62.9 NEUROPATHY: ICD-10-CM

## 2025-07-14 DIAGNOSIS — E11.42 TYPE 2 DIABETES MELLITUS WITH DIABETIC POLYNEUROPATHY, WITHOUT LONG-TERM CURRENT USE OF INSULIN: ICD-10-CM

## 2025-07-14 RX ORDER — METOPROLOL SUCCINATE 50 MG/1
50 TABLET, EXTENDED RELEASE ORAL DAILY
Qty: 90 TABLET | Refills: 3 | Status: SHIPPED | OUTPATIENT
Start: 2025-07-14

## 2025-07-14 NOTE — PROGRESS NOTES
Care Management Monthly Outreach  Chart review completed  Confirmation of at least 2 patient identifiers  Change in insurance? No    Has patient been to ER/Urgent Care since last outreach? Yes - Patient in [facility] ED with complaints of nausea x3 days and diarrhea without pain.  Pt was diagnosed with diverticulitis and given Rx for Cipro, Flagyl, and Zofran.  Pt reports that the Zofran is not very effected.  Pt instructed to   1. take Zofran 30min-1hr prior to medication  2. Eat something prior to taking antibiotic    Last Office Visit with PCP: 1/6/2025   Next Office Visit with PCP: 11/20/2025   APC Collaboration: n/a    Chronic Conditions and Outreach Summary:   Type 2 diabetes mellitus with diabetic polyneuropathy, without long-term current use of insulin    Neuropathy    Diverticulosis    Patient was in [facility] ED for diverticulitis and released home with Rx for zofran and antibiotic.  She reports a F/U appt with PCP on 7/21/25 but would like an earlier appt if possible.  Message sent to clerical staff for assist.    Patient denies Sx of hypoglycemia and instruction provided on Zofran/antibiotics and food intake.    Last A1c was 6.5 which is within target range.          Medications:   Are there medication changes since last visit? Yes - See above note  Refills needed? No    Social Drivers of Health: Deferred  Care Gaps Addressed? Deferred  Care Plan addressed: No    Upcoming Appointments:   Future Appointments       Date / Time Provider Department Dept Phone    11/20/2025 1:40 PM (Arrive by 1:25 PM) Jf Aguirre MD [facility] [phone]          Blood Pressures Reviewed  BP Readings from Last 3 Encounters:   03/04/25 108/68   01/06/25 118/77   11/20/24 137/80     Labs Reviewed:  Lab Results   Component Value Date    CREATININE 0.92 06/11/2023    GLUCOSE 118 (H) 06/11/2023    ALKPHOS 101 06/06/2023    K 4.3 06/11/2023    PROT 7.9 06/06/2023     (L) 06/11/2023    CALCIUM 10.0 06/11/2023     AST 20 06/06/2023    ALT 18 06/06/2023    BUN 31 (H) 06/11/2023    MG 1.86 06/08/2023    PHOS 3.6 11/07/2022    GFRF 67 06/11/2023     Lab Results   Component Value Date    TRIG 323 (H) 06/07/2023    CHOL 169 06/07/2023    HDL 36.4 (A) 06/07/2023     Lab Results   Component Value Date    HGBA1C 6.4 (H) 03/18/2025    HGBA1C 6.3 11/20/2024    HGBA1C 6.5 05/24/2024     Lab Results   Component Value Date    WBC 15.3 (H) 06/11/2023    RBC 4.59 06/11/2023    HGB 12.6 06/11/2023     (H) 06/11/2023   No other concerns at this time.  Agreeable to continue monthly outreaches.  Encouraged to call if questions or concerns arise.    Marybeth Mchugh RN

## 2025-07-18 ENCOUNTER — APPOINTMENT (OUTPATIENT)
Dept: GENERAL RADIOLOGY | Age: 70
DRG: 195 | End: 2025-07-18
Payer: MEDICARE

## 2025-07-18 ENCOUNTER — HOSPITAL ENCOUNTER (INPATIENT)
Age: 70
LOS: 1 days | Discharge: ANOTHER HOSPITAL | DRG: 195 | End: 2025-07-19
Attending: EMERGENCY MEDICINE | Admitting: INTERNAL MEDICINE
Payer: MEDICARE

## 2025-07-18 ENCOUNTER — APPOINTMENT (OUTPATIENT)
Dept: PRIMARY CARE | Facility: CLINIC | Age: 70
End: 2025-07-18
Payer: MEDICARE

## 2025-07-18 ENCOUNTER — APPOINTMENT (OUTPATIENT)
Dept: CT IMAGING | Age: 70
DRG: 195 | End: 2025-07-18
Payer: MEDICARE

## 2025-07-18 DIAGNOSIS — G93.49 ENCEPHALOPATHY CHRONIC: ICD-10-CM

## 2025-07-18 DIAGNOSIS — E11.42 DIABETIC PERIPHERAL NEUROPATHY (MULTI): ICD-10-CM

## 2025-07-18 DIAGNOSIS — R53.1 GENERAL WEAKNESS: ICD-10-CM

## 2025-07-18 DIAGNOSIS — R09.02 HYPOXEMIA: Primary | ICD-10-CM

## 2025-07-18 DIAGNOSIS — R73.9 HYPERGLYCEMIA: ICD-10-CM

## 2025-07-18 DIAGNOSIS — J18.9 PNEUMONIA OF RIGHT LOWER LOBE DUE TO INFECTIOUS ORGANISM: ICD-10-CM

## 2025-07-18 DIAGNOSIS — Z66 DNR (DO NOT RESUSCITATE): ICD-10-CM

## 2025-07-18 LAB
ALBUMIN SERPL-MCNC: 4.2 G/DL (ref 3.5–4.6)
ALP SERPL-CCNC: 82 U/L (ref 40–130)
ALT SERPL-CCNC: 17 U/L (ref 0–33)
AMPHET UR QL SCN: NEGATIVE
ANION GAP SERPL CALCULATED.3IONS-SCNC: 12 MEQ/L (ref 9–15)
AST SERPL-CCNC: 28 U/L (ref 0–35)
BACTERIA URNS QL MICRO: ABNORMAL /HPF
BARBITURATES UR QL SCN: NEGATIVE
BASE EXCESS ARTERIAL: 10
BASOPHILS # BLD: 0.1 K/UL (ref 0–0.1)
BASOPHILS NFR BLD: 0.4 % (ref 0.1–1.2)
BENZODIAZ UR QL SCN: NEGATIVE
BILIRUB SERPL-MCNC: 0.4 MG/DL (ref 0.2–0.7)
BILIRUB UR QL STRIP: ABNORMAL
BNP BLD-MCNC: 1749 PG/ML
BUN SERPL-MCNC: 14 MG/DL (ref 8–23)
CALCIUM IONIZED: 1.23 MMOL/L (ref 1.12–1.32)
CALCIUM SERPL-MCNC: 9.8 MG/DL (ref 8.5–9.9)
CANNABINOIDS UR QL SCN: POSITIVE
CHLORIDE SERPL-SCNC: 99 MEQ/L (ref 95–107)
CLARITY UR: CLEAR
CO2 SERPL-SCNC: 31 MEQ/L (ref 20–31)
COCAINE UR QL SCN: NEGATIVE
COLOR UR: YELLOW
CREAT SERPL-MCNC: 0.6 MG/DL (ref 0.5–0.9)
DRUG SCREEN COMMENT UR-IMP: ABNORMAL
EOSINOPHIL # BLD: 0.2 K/UL (ref 0–0.4)
EOSINOPHIL NFR BLD: 1.5 % (ref 0.7–5.8)
EPI CELLS #/AREA URNS HPF: ABNORMAL /HPF
ERYTHROCYTE [DISTWIDTH] IN BLOOD BY AUTOMATED COUNT: 14.6 % (ref 11.7–14.4)
FENTANYL SCREEN, URINE: NEGATIVE
GLOBULIN SER CALC-MCNC: 2.9 G/DL (ref 2.3–3.5)
GLUCOSE BLD-MCNC: 130 MG/DL (ref 70–99)
GLUCOSE SERPL-MCNC: 129 MG/DL (ref 70–99)
GLUCOSE UR STRIP-MCNC: NEGATIVE MG/DL
HCO3 ARTERIAL: 33.8 MMOL/L (ref 21–29)
HCT VFR BLD AUTO: 41 % (ref 36–48)
HCT VFR BLD AUTO: 43.3 % (ref 37–47)
HGB BLD CALC-MCNC: 13.8 GM/DL (ref 12–16)
HGB BLD-MCNC: 13.5 G/DL (ref 11.2–15.7)
HGB UR QL STRIP: ABNORMAL
IMM GRANULOCYTES # BLD: 0.1 K/UL
IMM GRANULOCYTES NFR BLD: 1 %
INFLUENZA A BY PCR: NEGATIVE
INFLUENZA B BY PCR: NEGATIVE
INR PPP: 1
KETONES UR STRIP-MCNC: 15 MG/DL
LACTATE BLDV-SCNC: 1.4 MMOL/L (ref 0.5–2.2)
LACTATE: 0.86 MMOL/L (ref 0.4–2)
LEUKOCYTE ESTERASE UR QL STRIP: NEGATIVE
LYMPHOCYTES # BLD: 1.6 K/UL (ref 1.2–3.7)
LYMPHOCYTES NFR BLD: 12 %
MAGNESIUM SERPL-MCNC: 1.8 MG/DL (ref 1.7–2.4)
MCH RBC QN AUTO: 28.7 PG (ref 25.6–32.2)
MCHC RBC AUTO-ENTMCNC: 31.2 % (ref 32.2–35.5)
MCV RBC AUTO: 92.1 FL (ref 79.4–94.8)
METHADONE UR QL SCN: NEGATIVE
MONOCYTES # BLD: 0.8 K/UL (ref 0.2–0.9)
MONOCYTES NFR BLD: 5.6 % (ref 4.7–12.5)
MUCOUS THREADS URNS QL MICRO: PRESENT /LPF
NEUTROPHILS # BLD: 10.9 K/UL (ref 1.6–6.1)
NEUTS SEG NFR BLD: 79.5 % (ref 34–71.1)
NITRITE UR QL STRIP: NEGATIVE
O2 SAT, ARTERIAL: 96 % (ref 93–101)
OPIATES UR QL SCN: NEGATIVE
OXYCODONE UR QL SCN: NEGATIVE
PCO2 ARTERIAL: 46 MM HG (ref 35–45)
PCP UR QL SCN: NEGATIVE
PERFORMED ON: ABNORMAL
PH ARTERIAL: 7.48 (ref 7.35–7.45)
PH UR STRIP: 7 [PH] (ref 5–9)
PLATELET # BLD AUTO: 291 K/UL (ref 182–369)
PO2 ARTERIAL: 80 MM HG (ref 75–108)
POC CHLORIDE: 100 MEQ/L (ref 99–110)
POC CREATININE: 0.6 MG/DL (ref 0.6–1.2)
POC FIO2: 32
POC SAMPLE TYPE: ABNORMAL
POTASSIUM SERPL-SCNC: 3.8 MEQ/L (ref 3.5–5.1)
POTASSIUM SERPL-SCNC: 4.1 MEQ/L (ref 3.4–4.9)
PROT SERPL-MCNC: 7.1 G/DL (ref 6.3–8)
PROT UR STRIP-MCNC: >=300 MG/DL
PROTHROMBIN TIME: 13.7 SEC (ref 12.3–14.9)
RBC # BLD AUTO: 4.7 M/UL (ref 3.93–5.22)
RBC #/AREA URNS HPF: ABNORMAL /HPF (ref 0–2)
SARS-COV-2 RDRP RESP QL NAA+PROBE: NOT DETECTED
SODIUM BLD-SCNC: 145 MEQ/L (ref 136–145)
SODIUM SERPL-SCNC: 142 MEQ/L (ref 135–144)
SP GR UR STRIP: 1.02 (ref 1–1.03)
TCO2 ARTERIAL: 35 MMOL/L (ref 70–99)
TROPONIN, HIGH SENSITIVITY: 15 NG/L (ref 0–19)
TSH SERPL-MCNC: 0.89 UIU/ML (ref 0.44–3.86)
URINE REFLEX TO CULTURE: ABNORMAL
UROBILINOGEN UR STRIP-ACNC: 0.2 E.U./DL
WBC # BLD AUTO: 13.6 K/UL (ref 4–10)
WBC #/AREA URNS HPF: ABNORMAL /HPF (ref 0–5)

## 2025-07-18 PROCEDURE — 2580000003 HC RX 258: Performed by: EMERGENCY MEDICINE

## 2025-07-18 PROCEDURE — 87040 BLOOD CULTURE FOR BACTERIA: CPT

## 2025-07-18 PROCEDURE — 84484 ASSAY OF TROPONIN QUANT: CPT

## 2025-07-18 PROCEDURE — 94760 N-INVAS EAR/PLS OXIMETRY 1: CPT

## 2025-07-18 PROCEDURE — 85610 PROTHROMBIN TIME: CPT

## 2025-07-18 PROCEDURE — 93005 ELECTROCARDIOGRAM TRACING: CPT

## 2025-07-18 PROCEDURE — 2700000000 HC OXYGEN THERAPY PER DAY

## 2025-07-18 PROCEDURE — 83605 ASSAY OF LACTIC ACID: CPT

## 2025-07-18 PROCEDURE — 80307 DRUG TEST PRSMV CHEM ANLYZR: CPT

## 2025-07-18 PROCEDURE — 2500000003 HC RX 250 WO HCPCS: Performed by: INTERNAL MEDICINE

## 2025-07-18 PROCEDURE — 82803 BLOOD GASES ANY COMBINATION: CPT

## 2025-07-18 PROCEDURE — 71275 CT ANGIOGRAPHY CHEST: CPT

## 2025-07-18 PROCEDURE — 85014 HEMATOCRIT: CPT

## 2025-07-18 PROCEDURE — 82330 ASSAY OF CALCIUM: CPT

## 2025-07-18 PROCEDURE — 36600 WITHDRAWAL OF ARTERIAL BLOOD: CPT

## 2025-07-18 PROCEDURE — 6360000004 HC RX CONTRAST MEDICATION: Performed by: EMERGENCY MEDICINE

## 2025-07-18 PROCEDURE — 82435 ASSAY OF BLOOD CHLORIDE: CPT

## 2025-07-18 PROCEDURE — 6360000002 HC RX W HCPCS: Performed by: INTERNAL MEDICINE

## 2025-07-18 PROCEDURE — 36415 COLL VENOUS BLD VENIPUNCTURE: CPT

## 2025-07-18 PROCEDURE — 82565 ASSAY OF CREATININE: CPT

## 2025-07-18 PROCEDURE — 6360000002 HC RX W HCPCS: Performed by: EMERGENCY MEDICINE

## 2025-07-18 PROCEDURE — 80053 COMPREHEN METABOLIC PANEL: CPT

## 2025-07-18 PROCEDURE — 96374 THER/PROPH/DIAG INJ IV PUSH: CPT

## 2025-07-18 PROCEDURE — 87502 INFLUENZA DNA AMP PROBE: CPT

## 2025-07-18 PROCEDURE — 99285 EMERGENCY DEPT VISIT HI MDM: CPT

## 2025-07-18 PROCEDURE — 1210000000 HC MED SURG R&B

## 2025-07-18 PROCEDURE — 83880 ASSAY OF NATRIURETIC PEPTIDE: CPT

## 2025-07-18 PROCEDURE — 85025 COMPLETE CBC W/AUTO DIFF WBC: CPT

## 2025-07-18 PROCEDURE — 84295 ASSAY OF SERUM SODIUM: CPT

## 2025-07-18 PROCEDURE — 84443 ASSAY THYROID STIM HORMONE: CPT

## 2025-07-18 PROCEDURE — 81001 URINALYSIS AUTO W/SCOPE: CPT

## 2025-07-18 PROCEDURE — 84132 ASSAY OF SERUM POTASSIUM: CPT

## 2025-07-18 PROCEDURE — 83735 ASSAY OF MAGNESIUM: CPT

## 2025-07-18 PROCEDURE — 87635 SARS-COV-2 COVID-19 AMP PRB: CPT

## 2025-07-18 PROCEDURE — 96375 TX/PRO/DX INJ NEW DRUG ADDON: CPT

## 2025-07-18 PROCEDURE — 71045 X-RAY EXAM CHEST 1 VIEW: CPT

## 2025-07-18 PROCEDURE — 70450 CT HEAD/BRAIN W/O DYE: CPT

## 2025-07-18 PROCEDURE — 51701 INSERT BLADDER CATHETER: CPT

## 2025-07-18 RX ORDER — ONDANSETRON 2 MG/ML
4 INJECTION INTRAMUSCULAR; INTRAVENOUS EVERY 6 HOURS PRN
Status: DISCONTINUED | OUTPATIENT
Start: 2025-07-18 | End: 2025-07-19 | Stop reason: HOSPADM

## 2025-07-18 RX ORDER — ONDANSETRON 4 MG/1
4 TABLET, ORALLY DISINTEGRATING ORAL EVERY 8 HOURS PRN
Status: DISCONTINUED | OUTPATIENT
Start: 2025-07-18 | End: 2025-07-19 | Stop reason: HOSPADM

## 2025-07-18 RX ORDER — ENOXAPARIN SODIUM 100 MG/ML
40 INJECTION SUBCUTANEOUS DAILY
Status: DISCONTINUED | OUTPATIENT
Start: 2025-07-18 | End: 2025-07-19 | Stop reason: HOSPADM

## 2025-07-18 RX ORDER — POLYETHYLENE GLYCOL 3350 17 G/17G
17 POWDER, FOR SOLUTION ORAL DAILY PRN
Status: DISCONTINUED | OUTPATIENT
Start: 2025-07-18 | End: 2025-07-19 | Stop reason: HOSPADM

## 2025-07-18 RX ORDER — METHYLPREDNISOLONE SODIUM SUCCINATE 125 MG/2ML
125 INJECTION INTRAMUSCULAR; INTRAVENOUS ONCE
Status: COMPLETED | OUTPATIENT
Start: 2025-07-18 | End: 2025-07-18

## 2025-07-18 RX ORDER — SODIUM CHLORIDE 9 MG/ML
INJECTION, SOLUTION INTRAVENOUS CONTINUOUS
Status: DISCONTINUED | OUTPATIENT
Start: 2025-07-18 | End: 2025-07-19

## 2025-07-18 RX ORDER — ACETAMINOPHEN 650 MG/1
650 SUPPOSITORY RECTAL EVERY 6 HOURS PRN
Status: DISCONTINUED | OUTPATIENT
Start: 2025-07-18 | End: 2025-07-19 | Stop reason: HOSPADM

## 2025-07-18 RX ORDER — ACETAMINOPHEN 325 MG/1
650 TABLET ORAL EVERY 6 HOURS PRN
Status: DISCONTINUED | OUTPATIENT
Start: 2025-07-18 | End: 2025-07-19 | Stop reason: HOSPADM

## 2025-07-18 RX ORDER — SODIUM CHLORIDE 9 MG/ML
INJECTION, SOLUTION INTRAVENOUS PRN
Status: DISCONTINUED | OUTPATIENT
Start: 2025-07-18 | End: 2025-07-19 | Stop reason: HOSPADM

## 2025-07-18 RX ORDER — CELECOXIB 200 MG/1
200 CAPSULE ORAL
Qty: 90 CAPSULE | Refills: 0 | Status: SHIPPED | OUTPATIENT
Start: 2025-07-18

## 2025-07-18 RX ORDER — SODIUM CHLORIDE 0.9 % (FLUSH) 0.9 %
5-40 SYRINGE (ML) INJECTION EVERY 12 HOURS SCHEDULED
Status: DISCONTINUED | OUTPATIENT
Start: 2025-07-18 | End: 2025-07-19 | Stop reason: HOSPADM

## 2025-07-18 RX ORDER — METHYLPREDNISOLONE SODIUM SUCCINATE 40 MG/ML
40 INJECTION INTRAMUSCULAR; INTRAVENOUS EVERY 12 HOURS
Status: DISCONTINUED | OUTPATIENT
Start: 2025-07-19 | End: 2025-07-19 | Stop reason: HOSPADM

## 2025-07-18 RX ORDER — NALOXONE HYDROCHLORIDE 1 MG/ML
1 INJECTION INTRAMUSCULAR; INTRAVENOUS; SUBCUTANEOUS ONCE
Status: COMPLETED | OUTPATIENT
Start: 2025-07-18 | End: 2025-07-18

## 2025-07-18 RX ORDER — IOPAMIDOL 755 MG/ML
75 INJECTION, SOLUTION INTRAVASCULAR
Status: COMPLETED | OUTPATIENT
Start: 2025-07-18 | End: 2025-07-18

## 2025-07-18 RX ADMIN — SODIUM CHLORIDE: 0.9 INJECTION, SOLUTION INTRAVENOUS at 13:19

## 2025-07-18 RX ADMIN — NALOXONE HYDROCHLORIDE 1 MG: 1 INJECTION PARENTERAL at 13:19

## 2025-07-18 RX ADMIN — ENOXAPARIN SODIUM 40 MG: 100 INJECTION SUBCUTANEOUS at 19:21

## 2025-07-18 RX ADMIN — SODIUM CHLORIDE: 0.9 INJECTION, SOLUTION INTRAVENOUS at 22:28

## 2025-07-18 RX ADMIN — METHYLPREDNISOLONE SODIUM SUCCINATE 125 MG: 125 INJECTION INTRAMUSCULAR; INTRAVENOUS at 13:54

## 2025-07-18 RX ADMIN — CEFTRIAXONE 1000 MG: 1 INJECTION, POWDER, FOR SOLUTION INTRAMUSCULAR; INTRAVENOUS at 14:47

## 2025-07-18 RX ADMIN — Medication 10 ML: at 21:16

## 2025-07-18 RX ADMIN — IOPAMIDOL 75 ML: 755 INJECTION, SOLUTION INTRAVENOUS at 14:24

## 2025-07-18 ASSESSMENT — ENCOUNTER SYMPTOMS
DIARRHEA: 0
CONSTIPATION: 0
EYE PAIN: 0
FACIAL SWELLING: 0
WHEEZING: 0
EYE REDNESS: 0
SINUS PRESSURE: 0
STRIDOR: 0
EYE DISCHARGE: 0
VOICE CHANGE: 0
SHORTNESS OF BREATH: 1
VOMITING: 0
ABDOMINAL PAIN: 0
CHEST TIGHTNESS: 0
BACK PAIN: 0
CHOKING: 0
BLOOD IN STOOL: 0
SORE THROAT: 0
TROUBLE SWALLOWING: 0
COUGH: 0

## 2025-07-18 NOTE — ED PROVIDER NOTES
[facility] EMERGENCY DEPARTMENT  eMERGENCY dEPARTMENT eNCOUnter      Pt Name: Lin Ferrell  MRN: [id number]  Birthdate 1955  Date of evaluation: 7/18/2025  Provider: Garrett Lopez MD    CHIEF COMPLAINT       Chief Complaint   Patient presents with    Altered Mental Status     ORY OF PRESENT ILLNESS   (Location/Symptom, Timing/Onset,Context/Setting, Quality, Duration, Modifying Factors, Severity)  Note limiting factors.   Lin Ferrell is a 70 y.o. female who presents to the emergency department patient family called for the ambulance for suspected UTI as patient was on the toilet seat for 1 or 2 hours time unable to get up paramedics at the scene and noted her low oxygenation has some wheezing in the lungs noted to be slightly confused patient blood sugar at the scene was 160 and low saturation put on oxygen and given DuoNeb aerosol treatment and route by the time she came here she was slightly better patient recently seen in this emergency last Friday for acute abdominal pain and diagnosed with acute diverticulitis and finishing course of Flagyl and Cipro has no injury documented    HPI    NursingNotes were reviewed.    REVIEW OF SYSTEMS    (2-9 systems for level 4, 10 or more for level 5)     Review of Systems   Constitutional: Negative.  Negative for activity change and fever.   HENT:  Negative for congestion, drooling, facial swelling, mouth sores, nosebleeds, sinus pressure, sore throat, trouble swallowing and voice change.    Eyes:  Negative for pain, discharge, redness and visual disturbance.   Respiratory:  Positive for shortness of breath. Negative for cough, choking, chest tightness, wheezing and stridor.    Cardiovascular:  Negative for chest pain, palpitations and leg swelling.   Gastrointestinal:  Negative for abdominal pain, blood in stool, constipation, diarrhea and vomiting.   Endocrine: Negative for cold intolerance, polyphagia and polyuria.   Genitourinary:  Negative for dysuria, flank pain,  that portions of this note were completed with a voice recognition program.  Efforts were made to edit the dictations but occasionally words are mis-transcribed.)    Garrett Lopez MD (electronically signed)  Attending Emergency Physician        Garrett Lopez MD  07/18/25 0012

## 2025-07-19 ENCOUNTER — HOSPITAL ENCOUNTER (INPATIENT)
Age: 70
LOS: 8 days | Discharge: HOME | DRG: 100 | End: 2025-07-28
Attending: INTERNAL MEDICINE | Admitting: INTERNAL MEDICINE
Payer: MEDICARE

## 2025-07-19 VITALS
RESPIRATION RATE: 30 BRPM | SYSTOLIC BLOOD PRESSURE: 162 MMHG | HEIGHT: 66 IN | WEIGHT: 186.8 LBS | DIASTOLIC BLOOD PRESSURE: 82 MMHG | TEMPERATURE: 98.8 F | BODY MASS INDEX: 30.02 KG/M2 | OXYGEN SATURATION: 93 % | HEART RATE: 105 BPM

## 2025-07-19 DIAGNOSIS — I63.411 CEREBROVASCULAR ACCIDENT (CVA) DUE TO EMBOLISM OF RIGHT MIDDLE CEREBRAL ARTERY (HCC): Primary | ICD-10-CM

## 2025-07-19 DIAGNOSIS — R94.01 ABNORMAL EEG: ICD-10-CM

## 2025-07-19 DIAGNOSIS — G93.40 ENCEPHALOPATHY ACUTE: ICD-10-CM

## 2025-07-19 PROBLEM — I63.9 STROKE DETERMINED BY CLINICAL ASSESSMENT (HCC): Status: ACTIVE | Noted: 2025-07-19

## 2025-07-19 LAB
ANION GAP SERPL CALCULATED.3IONS-SCNC: 14 MEQ/L (ref 9–15)
BACTERIA BLD CULT ORG #2: NORMAL
BACTERIA BLD CULT: NORMAL
BASOPHILS # BLD: 0 K/UL (ref 0–0.1)
BASOPHILS NFR BLD: 0.1 % (ref 0.1–1.2)
BUN SERPL-MCNC: 18 MG/DL (ref 8–23)
CALCIUM SERPL-MCNC: 9.4 MG/DL (ref 8.5–9.9)
CHLORIDE SERPL-SCNC: 102 MEQ/L (ref 95–107)
CO2 SERPL-SCNC: 26 MEQ/L (ref 20–31)
CREAT SERPL-MCNC: 0.5 MG/DL (ref 0.5–0.9)
EOSINOPHIL # BLD: 0 K/UL (ref 0–0.4)
EOSINOPHIL NFR BLD: 0 % (ref 0.7–5.8)
ERYTHROCYTE [DISTWIDTH] IN BLOOD BY AUTOMATED COUNT: 14.4 % (ref 11.7–14.4)
GLUCOSE SERPL-MCNC: 154 MG/DL (ref 70–99)
HCT VFR BLD AUTO: 39.7 % (ref 37–47)
HGB BLD-MCNC: 12.7 G/DL (ref 11.2–15.7)
IMM GRANULOCYTES # BLD: 0.1 K/UL
IMM GRANULOCYTES NFR BLD: 0.8 %
LYMPHOCYTES # BLD: 0.9 K/UL (ref 1.2–3.7)
LYMPHOCYTES NFR BLD: 6.2 %
MAGNESIUM SERPL-MCNC: 1.6 MG/DL (ref 1.7–2.4)
MCH RBC QN AUTO: 28.3 PG (ref 25.6–32.2)
MCHC RBC AUTO-ENTMCNC: 32 % (ref 32.2–35.5)
MCV RBC AUTO: 88.4 FL (ref 79.4–94.8)
MONOCYTES # BLD: 0.2 K/UL (ref 0.2–0.9)
MONOCYTES NFR BLD: 1.1 % (ref 4.7–12.5)
NEUTROPHILS # BLD: 12.8 K/UL (ref 1.6–6.1)
NEUTS SEG NFR BLD: 91.8 % (ref 34–71.1)
PLATELET # BLD AUTO: 294 K/UL (ref 182–369)
POTASSIUM SERPL-SCNC: 3.5 MEQ/L (ref 3.4–4.9)
RBC # BLD AUTO: 4.49 M/UL (ref 3.93–5.22)
SODIUM SERPL-SCNC: 142 MEQ/L (ref 135–144)
WBC # BLD AUTO: 13.9 K/UL (ref 4–10)

## 2025-07-19 PROCEDURE — 36415 COLL VENOUS BLD VENIPUNCTURE: CPT

## 2025-07-19 PROCEDURE — 6360000002 HC RX W HCPCS: Performed by: INTERNAL MEDICINE

## 2025-07-19 PROCEDURE — 85025 COMPLETE CBC W/AUTO DIFF WBC: CPT

## 2025-07-19 PROCEDURE — 2500000003 HC RX 250 WO HCPCS: Performed by: INTERNAL MEDICINE

## 2025-07-19 PROCEDURE — 94760 N-INVAS EAR/PLS OXIMETRY 1: CPT

## 2025-07-19 PROCEDURE — 2580000003 HC RX 258: Performed by: INTERNAL MEDICINE

## 2025-07-19 PROCEDURE — 83735 ASSAY OF MAGNESIUM: CPT

## 2025-07-19 PROCEDURE — 80048 BASIC METABOLIC PNL TOTAL CA: CPT

## 2025-07-19 PROCEDURE — 2580000003 HC RX 258: Performed by: EMERGENCY MEDICINE

## 2025-07-19 RX ORDER — LORAZEPAM 2 MG/ML
0.5 INJECTION INTRAMUSCULAR EVERY 4 HOURS PRN
Status: DISCONTINUED | OUTPATIENT
Start: 2025-07-19 | End: 2025-07-28 | Stop reason: HOSPADM

## 2025-07-19 RX ORDER — HYDRALAZINE HYDROCHLORIDE 20 MG/ML
5 INJECTION INTRAMUSCULAR; INTRAVENOUS ONCE
Status: COMPLETED | OUTPATIENT
Start: 2025-07-19 | End: 2025-07-19

## 2025-07-19 RX ORDER — ENOXAPARIN SODIUM 100 MG/ML
40 INJECTION SUBCUTANEOUS DAILY
Status: DISCONTINUED | OUTPATIENT
Start: 2025-07-20 | End: 2025-07-23

## 2025-07-19 RX ORDER — MAGNESIUM SULFATE IN WATER 40 MG/ML
2000 INJECTION, SOLUTION INTRAVENOUS ONCE
Status: COMPLETED | OUTPATIENT
Start: 2025-07-19 | End: 2025-07-19

## 2025-07-19 RX ORDER — ONDANSETRON 4 MG/1
4 TABLET, ORALLY DISINTEGRATING ORAL EVERY 8 HOURS PRN
Status: DISCONTINUED | OUTPATIENT
Start: 2025-07-19 | End: 2025-07-28 | Stop reason: HOSPADM

## 2025-07-19 RX ORDER — METOPROLOL TARTRATE 1 MG/ML
5 INJECTION, SOLUTION INTRAVENOUS EVERY 6 HOURS PRN
Status: CANCELLED | OUTPATIENT
Start: 2025-07-19

## 2025-07-19 RX ORDER — SODIUM CHLORIDE AND POTASSIUM CHLORIDE 150; 900 MG/100ML; MG/100ML
INJECTION, SOLUTION INTRAVENOUS CONTINUOUS
Status: DISCONTINUED | OUTPATIENT
Start: 2025-07-19 | End: 2025-07-19 | Stop reason: HOSPADM

## 2025-07-19 RX ORDER — HYDRALAZINE HYDROCHLORIDE 20 MG/ML
5 INJECTION INTRAMUSCULAR; INTRAVENOUS EVERY 4 HOURS PRN
Status: CANCELLED | OUTPATIENT
Start: 2025-07-19

## 2025-07-19 RX ORDER — METHYLPREDNISOLONE SODIUM SUCCINATE 40 MG/ML
40 INJECTION INTRAMUSCULAR; INTRAVENOUS EVERY 12 HOURS
Status: CANCELLED | OUTPATIENT
Start: 2025-07-20

## 2025-07-19 RX ORDER — SODIUM CHLORIDE 0.9 % (FLUSH) 0.9 %
5-40 SYRINGE (ML) INJECTION EVERY 12 HOURS SCHEDULED
Status: CANCELLED | OUTPATIENT
Start: 2025-07-19

## 2025-07-19 RX ORDER — POLYETHYLENE GLYCOL 3350 17 G/17G
17 POWDER, FOR SOLUTION ORAL DAILY PRN
Status: DISCONTINUED | OUTPATIENT
Start: 2025-07-19 | End: 2025-07-28 | Stop reason: HOSPADM

## 2025-07-19 RX ORDER — ACETAMINOPHEN 650 MG/1
650 SUPPOSITORY RECTAL EVERY 6 HOURS PRN
Status: CANCELLED | OUTPATIENT
Start: 2025-07-19

## 2025-07-19 RX ORDER — SODIUM CHLORIDE AND POTASSIUM CHLORIDE 150; 900 MG/100ML; MG/100ML
INJECTION, SOLUTION INTRAVENOUS CONTINUOUS
Status: DISCONTINUED | OUTPATIENT
Start: 2025-07-20 | End: 2025-07-24

## 2025-07-19 RX ORDER — HYDRALAZINE HYDROCHLORIDE 20 MG/ML
5 INJECTION INTRAMUSCULAR; INTRAVENOUS EVERY 4 HOURS PRN
Status: DISCONTINUED | OUTPATIENT
Start: 2025-07-19 | End: 2025-07-19 | Stop reason: HOSPADM

## 2025-07-19 RX ORDER — SODIUM CHLORIDE AND POTASSIUM CHLORIDE 150; 900 MG/100ML; MG/100ML
INJECTION, SOLUTION INTRAVENOUS CONTINUOUS
Status: CANCELLED | OUTPATIENT
Start: 2025-07-19

## 2025-07-19 RX ORDER — ONDANSETRON 2 MG/ML
4 INJECTION INTRAMUSCULAR; INTRAVENOUS EVERY 6 HOURS PRN
Status: DISCONTINUED | OUTPATIENT
Start: 2025-07-19 | End: 2025-07-28 | Stop reason: HOSPADM

## 2025-07-19 RX ORDER — SODIUM CHLORIDE 0.9 % (FLUSH) 0.9 %
5-40 SYRINGE (ML) INJECTION EVERY 12 HOURS SCHEDULED
Status: DISCONTINUED | OUTPATIENT
Start: 2025-07-20 | End: 2025-07-28 | Stop reason: HOSPADM

## 2025-07-19 RX ORDER — SODIUM CHLORIDE 9 MG/ML
INJECTION, SOLUTION INTRAVENOUS PRN
Status: CANCELLED | OUTPATIENT
Start: 2025-07-19

## 2025-07-19 RX ORDER — SODIUM CHLORIDE 9 MG/ML
INJECTION, SOLUTION INTRAVENOUS PRN
Status: DISCONTINUED | OUTPATIENT
Start: 2025-07-19 | End: 2025-07-28 | Stop reason: HOSPADM

## 2025-07-19 RX ORDER — ACETAMINOPHEN 650 MG/1
650 SUPPOSITORY RECTAL EVERY 6 HOURS PRN
Status: DISCONTINUED | OUTPATIENT
Start: 2025-07-19 | End: 2025-07-28 | Stop reason: HOSPADM

## 2025-07-19 RX ORDER — POLYETHYLENE GLYCOL 3350 17 G/17G
17 POWDER, FOR SOLUTION ORAL DAILY PRN
Status: CANCELLED | OUTPATIENT
Start: 2025-07-19

## 2025-07-19 RX ORDER — METHYLPREDNISOLONE SODIUM SUCCINATE 40 MG/ML
40 INJECTION INTRAMUSCULAR; INTRAVENOUS EVERY 12 HOURS
Status: DISCONTINUED | OUTPATIENT
Start: 2025-07-20 | End: 2025-07-24

## 2025-07-19 RX ORDER — METOPROLOL TARTRATE 1 MG/ML
5 INJECTION, SOLUTION INTRAVENOUS EVERY 6 HOURS PRN
Status: DISCONTINUED | OUTPATIENT
Start: 2025-07-19 | End: 2025-07-21

## 2025-07-19 RX ORDER — ONDANSETRON 4 MG/1
4 TABLET, ORALLY DISINTEGRATING ORAL EVERY 8 HOURS PRN
Status: CANCELLED | OUTPATIENT
Start: 2025-07-19

## 2025-07-19 RX ORDER — ACETAMINOPHEN 325 MG/1
650 TABLET ORAL EVERY 6 HOURS PRN
Status: CANCELLED | OUTPATIENT
Start: 2025-07-19

## 2025-07-19 RX ORDER — LORAZEPAM 2 MG/ML
0.5 INJECTION INTRAMUSCULAR EVERY 4 HOURS PRN
Status: CANCELLED | OUTPATIENT
Start: 2025-07-19

## 2025-07-19 RX ORDER — ACETAMINOPHEN 325 MG/1
650 TABLET ORAL EVERY 6 HOURS PRN
Status: DISCONTINUED | OUTPATIENT
Start: 2025-07-19 | End: 2025-07-28 | Stop reason: HOSPADM

## 2025-07-19 RX ORDER — ONDANSETRON 2 MG/ML
4 INJECTION INTRAMUSCULAR; INTRAVENOUS EVERY 6 HOURS PRN
Status: CANCELLED | OUTPATIENT
Start: 2025-07-19

## 2025-07-19 RX ORDER — ENOXAPARIN SODIUM 100 MG/ML
40 INJECTION SUBCUTANEOUS DAILY
Status: CANCELLED | OUTPATIENT
Start: 2025-07-20

## 2025-07-19 RX ORDER — HYDRALAZINE HYDROCHLORIDE 20 MG/ML
5 INJECTION INTRAMUSCULAR; INTRAVENOUS EVERY 4 HOURS PRN
Status: DISCONTINUED | OUTPATIENT
Start: 2025-07-19 | End: 2025-07-28 | Stop reason: HOSPADM

## 2025-07-19 RX ORDER — METOPROLOL TARTRATE 1 MG/ML
5 INJECTION, SOLUTION INTRAVENOUS EVERY 6 HOURS PRN
Status: DISCONTINUED | OUTPATIENT
Start: 2025-07-19 | End: 2025-07-19 | Stop reason: HOSPADM

## 2025-07-19 RX ORDER — LORAZEPAM 2 MG/ML
0.5 INJECTION INTRAMUSCULAR EVERY 4 HOURS PRN
Status: DISCONTINUED | OUTPATIENT
Start: 2025-07-19 | End: 2025-07-19 | Stop reason: HOSPADM

## 2025-07-19 RX ADMIN — METOPROLOL TARTRATE 5 MG: 5 INJECTION INTRAVENOUS at 18:39

## 2025-07-19 RX ADMIN — HYDRALAZINE HYDROCHLORIDE 5 MG: 20 INJECTION INTRAMUSCULAR; INTRAVENOUS at 18:03

## 2025-07-19 RX ADMIN — POTASSIUM CHLORIDE AND SODIUM CHLORIDE: 900; 150 INJECTION, SOLUTION INTRAVENOUS at 11:13

## 2025-07-19 RX ADMIN — MAGNESIUM SULFATE HEPTAHYDRATE 2000 MG: 40 INJECTION, SOLUTION INTRAVENOUS at 11:15

## 2025-07-19 RX ADMIN — Medication 10 ML: at 11:16

## 2025-07-19 RX ADMIN — METHYLPREDNISOLONE SODIUM SUCCINATE 40 MG: 40 INJECTION INTRAMUSCULAR; INTRAVENOUS at 01:59

## 2025-07-19 RX ADMIN — ENOXAPARIN SODIUM 40 MG: 100 INJECTION SUBCUTANEOUS at 09:12

## 2025-07-19 RX ADMIN — LORAZEPAM 0.5 MG: 2 INJECTION INTRAMUSCULAR; INTRAVENOUS at 11:16

## 2025-07-19 RX ADMIN — LORAZEPAM 0.5 MG: 2 INJECTION INTRAMUSCULAR; INTRAVENOUS at 16:44

## 2025-07-19 RX ADMIN — CEFTRIAXONE 1000 MG: 1 INJECTION, POWDER, FOR SOLUTION INTRAMUSCULAR; INTRAVENOUS at 14:57

## 2025-07-19 RX ADMIN — HYDRALAZINE HYDROCHLORIDE 5 MG: 20 INJECTION INTRAMUSCULAR; INTRAVENOUS at 14:57

## 2025-07-19 RX ADMIN — METHYLPREDNISOLONE SODIUM SUCCINATE 40 MG: 40 INJECTION INTRAMUSCULAR; INTRAVENOUS at 14:58

## 2025-07-19 RX ADMIN — SODIUM CHLORIDE: 0.9 INJECTION, SOLUTION INTRAVENOUS at 09:12

## 2025-07-19 NOTE — CARE COORDINATION
consult called to answering services.  called back and informed us that he is not available until Monday.  aware.

## 2025-07-19 NOTE — H&P
Hospital Medicine History & Physical      PCP: Kobe Duke MD    Date of Admission: 7/18/2025    Date of Service: 7/19/25      Chief Complaint:  change in mental status       History Of Present Illness:  70 y.o. female who presented to [facility] with above complains. Patient was in ER week ago, was diagnosed with  diverticulitis and was DC home with PO cipro and was doing well until yesterday when she was found sitting on toilet, wasn't able to get up, wasn't communicating with her sister and at this point she was brought to ER. After initial stabilization was admitted over night  History was taken from sister at bedside and chart since patient non verbal at this point   Per her sister report patient had some abd discomfort 2 days ago which was attributed to PO cipro/diverticulitis, denied fever/dyspnea/CP, dizziness, was able to ambulate herself, had previous CVA with minimal R arm weakness     Past Medical History:          Diagnosis Date    Asthma     Cerebral artery occlusion with cerebral infarction (HCC)     8/2021    Depression     Migraine     Thyroid disease     hypothyroid       Past Surgical History:          Procedure Laterality Date    HYSTERECTOMY (CERVIX STATUS UNKNOWN)      KNEE SURGERY      left knee total replacement       Medications Prior to Admission:      Prior to Admission medications    Medication Sig Start Date End Date Taking? Authorizing Provider   ciprofloxacin (CIPRO) 500 MG tablet Take 1 tablet by mouth 2 times daily for 7 days 7/12/25 7/19/25 Yes Almaz Arce DO   metroNIDAZOLE (FLAGYL) 500 MG tablet Take 1 tablet by mouth 2 times daily for 7 days 7/12/25 7/19/25 Yes Almaz Arce DO   guaiFENesin (MUCINEX) 600 MG extended release tablet Take 1 tablet by mouth 3 times daily as needed for Congestion 12/29/21  Yes Deepika Joya MD   albuterol sulfate HFA (VENTOLIN HFA) 108 (90 Base) MCG/ACT inhaler Inhale 2 puffs into the lungs 4 times daily as needed for Wheezing  Rales/Wheezes/Rhonchi.  Cardiovascular:  Regular rate and rhythm with normal S1/S2 without murmurs, rubs or gallops.  Abdomen: Soft, non-tender, non-distended with normal bowel sounds.  Musculoskeletal:  No clubbing, cyanosis or edema bilaterally.  Full range of motion without deformity.  Skin: Skin color, texture, turgor normal.  No rashes or lesions.  Neurologic: patient restless, moving all extremities, doesn't follow commands, non verbal    Psychiatric:  as above  Capillary Refill: Brisk,< 3 seconds   Peripheral Pulses: +2 palpable, equal bilaterally       Labs:     Recent Labs     07/18/25  1309 07/18/25  1333 07/19/25  0546   WBC 13.6*  --  13.9*   HGB 13.5 13.8 12.7   HCT 43.3  --  39.7     --  294     Recent Labs     07/18/25  1309 07/18/25  1333 07/19/25  0547     --  142   K 4.1  --  3.5   CL 99  --  102   CO2 31  --  26   BUN 14  --  18   CREATININE 0.60 0.6 0.50   CALCIUM 9.8  --  9.4     Recent Labs     07/18/25  1309   AST 28   ALT 17   BILITOT 0.4   ALKPHOS 82     Recent Labs     07/18/25  1309   INR 1.0     No results for input(s): \"CKTOTAL\", \"TROPONINI\" in the last 72 hours.    Urinalysis:      Lab Results   Component Value Date/Time    NITRU Negative 07/18/2025 02:55 PM    WBCUA 0-2 07/18/2025 02:55 PM    BACTERIA FEW 07/18/2025 02:55 PM    RBCUA 0-2 07/18/2025 02:55 PM    BLOODU Trace-intact 07/18/2025 02:55 PM    GLUCOSEU Negative 07/18/2025 02:55 PM    GLUCOSEU NEG 06/02/2012 05:38 PM       Radiology:     CXR: I have reviewed the CXR with the following interpretation:   EKG:  I have reviewed the EKG with the following interpretation:     CT Head W/O Contrast   Final Result   1.  No intracranial hemorrhage or acute intracranial disease.      2.  Left cerebral remote infarct involving ipsilateral MCA branches including   the left lenticulostriate distribution.      3.  Empty sella is present which is nonspecific and may be seen with   idiopathic intracranial hypertension.      4.

## 2025-07-19 NOTE — CARE COORDINATION
Call out to transfer center spoke to Hollie WISE . New transfer to [facility] initiated awaiting acceptance and bed assignment. CM to follow.

## 2025-07-20 ENCOUNTER — APPOINTMENT (OUTPATIENT)
Dept: ULTRASOUND IMAGING | Age: 70
DRG: 100 | End: 2025-07-20
Attending: INTERNAL MEDICINE
Payer: MEDICARE

## 2025-07-20 PROBLEM — G93.40 ENCEPHALOPATHY ACUTE: Status: ACTIVE | Noted: 2025-07-20

## 2025-07-20 LAB
ANION GAP SERPL CALCULATED.3IONS-SCNC: 15 MEQ/L (ref 9–15)
ANISOCYTOSIS BLD QL SMEAR: ABNORMAL
BASE EXCESS ARTERIAL: 2 (ref -3–3)
BASOPHILS # BLD: 0.2 K/UL (ref 0–0.2)
BASOPHILS NFR BLD: 1 %
BNP BLD-MCNC: 2138 PG/ML
BUN SERPL-MCNC: 21 MG/DL (ref 8–23)
CALCIUM IONIZED: 1.21 MMOL/L (ref 1.12–1.32)
CALCIUM SERPL-MCNC: 9 MG/DL (ref 8.5–9.9)
CHLORIDE SERPL-SCNC: 100 MEQ/L (ref 95–107)
CO2 SERPL-SCNC: 23 MEQ/L (ref 20–31)
CREAT SERPL-MCNC: 0.48 MG/DL (ref 0.5–0.9)
CRP SERPL HS-MCNC: 12.7 MG/L (ref 0–5)
EKG ATRIAL RATE: 68 BPM
EKG DIAGNOSIS: NORMAL
EKG P AXIS: 39 DEGREES
EKG P-R INTERVAL: 156 MS
EKG Q-T INTERVAL: 444 MS
EKG QRS DURATION: 134 MS
EKG QTC CALCULATION (BAZETT): 472 MS
EKG R AXIS: -69 DEGREES
EKG T AXIS: 4 DEGREES
EKG VENTRICULAR RATE: 68 BPM
EOSINOPHIL # BLD: 0 K/UL (ref 0–0.7)
EOSINOPHIL NFR BLD: 0.5 %
ERYTHROCYTE [DISTWIDTH] IN BLOOD BY AUTOMATED COUNT: 15.2 % (ref 11.5–14.5)
GLUCOSE BLD-MCNC: 132 MG/DL (ref 70–99)
GLUCOSE BLD-MCNC: 138 MG/DL (ref 70–99)
GLUCOSE BLD-MCNC: 159 MG/DL (ref 70–99)
GLUCOSE SERPL-MCNC: 142 MG/DL (ref 70–99)
HCO3 ARTERIAL: 24.4 MMOL/L (ref 21–29)
HCT VFR BLD AUTO: 39.5 % (ref 37–47)
HCT VFR BLD AUTO: 42 % (ref 36–48)
HGB BLD CALC-MCNC: 14.4 GM/DL (ref 12–16)
HGB BLD-MCNC: 13.1 G/DL (ref 12–16)
LACTATE: 0.83 MMOL/L (ref 0.4–2)
LYMPHOCYTES # BLD: 0.7 K/UL (ref 1–4.8)
LYMPHOCYTES NFR BLD: 4 %
MCH RBC QN AUTO: 29 PG (ref 27–31.3)
MCHC RBC AUTO-ENTMCNC: 33.2 % (ref 33–37)
MCV RBC AUTO: 87.4 FL (ref 79.4–94.8)
MONOCYTES # BLD: 0 K/UL (ref 0.2–0.8)
MONOCYTES NFR BLD: 3.1 %
MYELOCYTES NFR BLD MANUAL: 1 %
NEUTROPHILS # BLD: 17.1 K/UL (ref 1.4–6.5)
NEUTS SEG NFR BLD: 94 %
O2 SAT, ARTERIAL: 96 % (ref 93–100)
PCO2 ARTERIAL: 28 MM HG (ref 35–45)
PERFORMED ON: ABNORMAL
PH ARTERIAL: 7.55 (ref 7.35–7.45)
PLATELET # BLD AUTO: 321 K/UL (ref 130–400)
PO2 ARTERIAL: 72 MM HG (ref 75–108)
POC CHLORIDE: 105 MEQ/L (ref 99–110)
POC CREATININE: 0.6 MG/DL (ref 0.6–1.2)
POC FIO2: 2
POC SAMPLE TYPE: ABNORMAL
POIKILOCYTOSIS BLD QL SMEAR: ABNORMAL
POTASSIUM SERPL-SCNC: 3.6 MEQ/L (ref 3.5–5.1)
POTASSIUM SERPL-SCNC: 3.7 MEQ/L (ref 3.4–4.9)
PROCALCITONIN SERPL IA-MCNC: 0.04 NG/ML (ref 0–0.15)
RBC # BLD AUTO: 4.52 M/UL (ref 4.2–5.4)
SMUDGE CELLS BLD QL SMEAR: 3.8
SODIUM BLD-SCNC: 140 MEQ/L (ref 136–145)
SODIUM SERPL-SCNC: 138 MEQ/L (ref 135–144)
TCO2 ARTERIAL: 25 MMOL/L (ref 21–32)
WBC # BLD AUTO: 18 K/UL (ref 4.8–10.8)

## 2025-07-20 PROCEDURE — 2500000003 HC RX 250 WO HCPCS: Performed by: INTERNAL MEDICINE

## 2025-07-20 PROCEDURE — 82803 BLOOD GASES ANY COMBINATION: CPT

## 2025-07-20 PROCEDURE — 99223 1ST HOSP IP/OBS HIGH 75: CPT | Performed by: PSYCHIATRY & NEUROLOGY

## 2025-07-20 PROCEDURE — 80048 BASIC METABOLIC PNL TOTAL CA: CPT

## 2025-07-20 PROCEDURE — 2700000000 HC OXYGEN THERAPY PER DAY

## 2025-07-20 PROCEDURE — G0378 HOSPITAL OBSERVATION PER HR: HCPCS

## 2025-07-20 PROCEDURE — 85025 COMPLETE CBC W/AUTO DIFF WBC: CPT

## 2025-07-20 PROCEDURE — 82435 ASSAY OF BLOOD CHLORIDE: CPT

## 2025-07-20 PROCEDURE — 1210000000 HC MED SURG R&B

## 2025-07-20 PROCEDURE — 83605 ASSAY OF LACTIC ACID: CPT

## 2025-07-20 PROCEDURE — 84295 ASSAY OF SERUM SODIUM: CPT

## 2025-07-20 PROCEDURE — 83880 ASSAY OF NATRIURETIC PEPTIDE: CPT

## 2025-07-20 PROCEDURE — 6370000000 HC RX 637 (ALT 250 FOR IP): Performed by: PSYCHIATRY & NEUROLOGY

## 2025-07-20 PROCEDURE — 94640 AIRWAY INHALATION TREATMENT: CPT

## 2025-07-20 PROCEDURE — 86140 C-REACTIVE PROTEIN: CPT

## 2025-07-20 PROCEDURE — 6370000000 HC RX 637 (ALT 250 FOR IP): Performed by: INTERNAL MEDICINE

## 2025-07-20 PROCEDURE — 84132 ASSAY OF SERUM POTASSIUM: CPT

## 2025-07-20 PROCEDURE — 93880 EXTRACRANIAL BILAT STUDY: CPT

## 2025-07-20 PROCEDURE — 82565 ASSAY OF CREATININE: CPT

## 2025-07-20 PROCEDURE — 36600 WITHDRAWAL OF ARTERIAL BLOOD: CPT

## 2025-07-20 PROCEDURE — 36415 COLL VENOUS BLD VENIPUNCTURE: CPT

## 2025-07-20 PROCEDURE — 85014 HEMATOCRIT: CPT

## 2025-07-20 PROCEDURE — 82948 REAGENT STRIP/BLOOD GLUCOSE: CPT

## 2025-07-20 PROCEDURE — 94761 N-INVAS EAR/PLS OXIMETRY MLT: CPT

## 2025-07-20 PROCEDURE — 82330 ASSAY OF CALCIUM: CPT

## 2025-07-20 PROCEDURE — 6360000002 HC RX W HCPCS: Performed by: INTERNAL MEDICINE

## 2025-07-20 PROCEDURE — 84145 PROCALCITONIN (PCT): CPT

## 2025-07-20 RX ORDER — IPRATROPIUM BROMIDE AND ALBUTEROL SULFATE 2.5; .5 MG/3ML; MG/3ML
1 SOLUTION RESPIRATORY (INHALATION)
Status: DISCONTINUED | OUTPATIENT
Start: 2025-07-20 | End: 2025-07-20

## 2025-07-20 RX ORDER — ATORVASTATIN CALCIUM 80 MG/1
80 TABLET, FILM COATED ORAL NIGHTLY
Status: DISCONTINUED | OUTPATIENT
Start: 2025-07-20 | End: 2025-07-28 | Stop reason: HOSPADM

## 2025-07-20 RX ORDER — ASPIRIN 300 MG/1
300 SUPPOSITORY RECTAL DAILY
Status: DISCONTINUED | OUTPATIENT
Start: 2025-07-20 | End: 2025-07-24

## 2025-07-20 RX ORDER — IPRATROPIUM BROMIDE AND ALBUTEROL SULFATE 2.5; .5 MG/3ML; MG/3ML
1 SOLUTION RESPIRATORY (INHALATION)
Status: DISCONTINUED | OUTPATIENT
Start: 2025-07-21 | End: 2025-07-22

## 2025-07-20 RX ORDER — IPRATROPIUM BROMIDE AND ALBUTEROL SULFATE 2.5; .5 MG/3ML; MG/3ML
1 SOLUTION RESPIRATORY (INHALATION) EVERY 4 HOURS PRN
Status: DISCONTINUED | OUTPATIENT
Start: 2025-07-20 | End: 2025-07-28 | Stop reason: HOSPADM

## 2025-07-20 RX ADMIN — SODIUM CHLORIDE AND POTASSIUM CHLORIDE: .9; .15 SOLUTION INTRAVENOUS at 19:35

## 2025-07-20 RX ADMIN — IPRATROPIUM BROMIDE AND ALBUTEROL SULFATE 1 DOSE: .5; 2.5 SOLUTION RESPIRATORY (INHALATION) at 17:31

## 2025-07-20 RX ADMIN — SODIUM CHLORIDE AND POTASSIUM CHLORIDE: .9; .15 SOLUTION INTRAVENOUS at 00:35

## 2025-07-20 RX ADMIN — METHYLPREDNISOLONE SODIUM SUCCINATE 40 MG: 40 INJECTION, POWDER, LYOPHILIZED, FOR SOLUTION INTRAMUSCULAR; INTRAVENOUS at 03:05

## 2025-07-20 RX ADMIN — WATER 1000 MG: 1 INJECTION INTRAMUSCULAR; INTRAVENOUS; SUBCUTANEOUS at 14:03

## 2025-07-20 RX ADMIN — Medication 0.5 MG: at 03:19

## 2025-07-20 RX ADMIN — METHYLPREDNISOLONE SODIUM SUCCINATE 40 MG: 40 INJECTION, POWDER, LYOPHILIZED, FOR SOLUTION INTRAMUSCULAR; INTRAVENOUS at 14:03

## 2025-07-20 RX ADMIN — ATORVASTATIN CALCIUM 80 MG: 80 TABLET, FILM COATED ORAL at 20:38

## 2025-07-20 RX ADMIN — ENOXAPARIN SODIUM 40 MG: 100 INJECTION SUBCUTANEOUS at 10:48

## 2025-07-20 RX ADMIN — ASPIRIN 300 MG: 300 SUPPOSITORY RECTAL at 17:43

## 2025-07-20 ASSESSMENT — PAIN SCALES - GENERAL
PAINLEVEL_OUTOF10: 0

## 2025-07-20 ASSESSMENT — PAIN SCALES - WONG BAKER: WONGBAKER_NUMERICALRESPONSE: NO HURT

## 2025-07-20 NOTE — H&P
DEPARTMENT OF HOSPITAL MEDICINE    HISTORY AND PHYSICAL EXAM    PATIENT NAME:  Lin Ferrell    MRN:  [id number]  SERVICE DATE:  7/20/2025   SERVICE TIME:  1:11 AM    Primary Care Physician: Kobe Duke MD         SUBJECTIVE  CHIEF COMPLAINT:  No chief complaint on file.       HPI:     This is a 70-year-old female with a past medical history of CVA, thyroid disease, recently diagnosed with diverticulitis and finishing a course of Flagyl and Cipro, COPD, transferred here from [place] for evaluation of possible CVA.  According to the patient's sister, she was in her usual state of health earlier in the day, went to the bathroom and sat on the toilet seat for 1 to 2 hours unable to get up.  When paramedics arrived, she was found to have low oxygen saturation with some wheezing.  He was also confused.  Blood sugar checked at the scene was 160.  She was subsequently admitted at [facility] for metabolic encephalopathy of unknown etiology.  She is transferred here for neuro services  PAST MEDICAL HISTORY:    Past Medical History:   Diagnosis Date    Asthma     Cerebral artery occlusion with cerebral infarction (HCC)     8/2021    Depression     Migraine     Thyroid disease     hypothyroid     PAST SURGICAL HISTORY:    Past Surgical History:   Procedure Laterality Date    HYSTERECTOMY (CERVIX STATUS UNKNOWN)      KNEE SURGERY      left knee total replacement     FAMILY HISTORY:  No family history on file.  SOCIAL HISTORY:    Social History     Socioeconomic History    Marital status:      Spouse name: Not on file    Number of children: Not on file    Years of education: Not on file    Highest education level: Not on file   Occupational History    Not on file   Tobacco Use    Smoking status: Every Day     Types: Cigarettes    Smokeless tobacco: Never    Tobacco comments:     \"a cigarette and a half a day\"   Vaping Use    Vaping status: Never Used   Substance and Sexual Activity    Alcohol use: No    Drug use: Yes

## 2025-07-20 NOTE — FLOWSHEET NOTE
Patient trying to get OOB, assisted to sit at the side of the bed with supervision. She is now able to say a few words, her sister is at the bedside chatting with her. Very alert and moving all extremities. Remains unable to follow commands.

## 2025-07-21 ENCOUNTER — APPOINTMENT (OUTPATIENT)
Dept: MRI IMAGING | Age: 70
DRG: 100 | End: 2025-07-21
Attending: INTERNAL MEDICINE
Payer: MEDICARE

## 2025-07-21 LAB
ALBUMIN SERPL-MCNC: 4 G/DL (ref 3.5–4.6)
ANION GAP SERPL CALCULATED.3IONS-SCNC: 13 MEQ/L (ref 9–15)
BASOPHILS # BLD: 0 K/UL (ref 0–0.2)
BASOPHILS NFR BLD: 0.1 %
BUN SERPL-MCNC: 24 MG/DL (ref 8–23)
CALCIUM SERPL-MCNC: 9.1 MG/DL (ref 8.5–9.9)
CHLORIDE SERPL-SCNC: 104 MEQ/L (ref 95–107)
CHOLEST SERPL-MCNC: 183 MG/DL (ref 0–199)
CO2 SERPL-SCNC: 21 MEQ/L (ref 20–31)
CREAT SERPL-MCNC: 0.54 MG/DL (ref 0.5–0.9)
EOSINOPHIL # BLD: 0 K/UL (ref 0–0.7)
EOSINOPHIL NFR BLD: 0 %
ERYTHROCYTE [DISTWIDTH] IN BLOOD BY AUTOMATED COUNT: 15.1 % (ref 11.5–14.5)
ESTIMATED AVERAGE GLUCOSE: 134 MG/DL
GLUCOSE SERPL-MCNC: 128 MG/DL (ref 70–99)
HBA1C MFR BLD: 6.3 % (ref 4–6)
HCT VFR BLD AUTO: 40.2 % (ref 37–47)
HDLC SERPL-MCNC: 42 MG/DL (ref 40–59)
HGB BLD-MCNC: 13.1 G/DL (ref 12–16)
LDLC SERPL CALC-MCNC: 69 MG/DL (ref 0–129)
LYMPHOCYTES # BLD: 2 K/UL (ref 1–4.8)
LYMPHOCYTES NFR BLD: 11.4 %
MAGNESIUM SERPL-MCNC: 2 MG/DL (ref 1.7–2.4)
MCH RBC QN AUTO: 28.1 PG (ref 27–31.3)
MCHC RBC AUTO-ENTMCNC: 32.6 % (ref 33–37)
MCV RBC AUTO: 86.3 FL (ref 79.4–94.8)
MONOCYTES # BLD: 1.3 K/UL (ref 0.2–0.8)
MONOCYTES NFR BLD: 7.5 %
NEUTROPHILS # BLD: 14.1 K/UL (ref 1.4–6.5)
NEUTS SEG NFR BLD: 80.4 %
PHOSPHATE SERPL-MCNC: 3.2 MG/DL (ref 2.3–4.8)
PLATELET # BLD AUTO: 345 K/UL (ref 130–400)
POTASSIUM SERPL-SCNC: 3.9 MEQ/L (ref 3.4–4.9)
RBC # BLD AUTO: 4.66 M/UL (ref 4.2–5.4)
SODIUM SERPL-SCNC: 138 MEQ/L (ref 135–144)
TRIGL SERPL-MCNC: 360 MG/DL (ref 0–150)
WBC # BLD AUTO: 17.5 K/UL (ref 4.8–10.8)

## 2025-07-21 PROCEDURE — 2500000003 HC RX 250 WO HCPCS: Performed by: INTERNAL MEDICINE

## 2025-07-21 PROCEDURE — 87529 HSV DNA AMP PROBE: CPT

## 2025-07-21 PROCEDURE — 6370000000 HC RX 637 (ALT 250 FOR IP): Performed by: INTERNAL MEDICINE

## 2025-07-21 PROCEDURE — 1210000000 HC MED SURG R&B

## 2025-07-21 PROCEDURE — 94640 AIRWAY INHALATION TREATMENT: CPT

## 2025-07-21 PROCEDURE — 2700000000 HC OXYGEN THERAPY PER DAY

## 2025-07-21 PROCEDURE — 6370000000 HC RX 637 (ALT 250 FOR IP): Performed by: PSYCHIATRY & NEUROLOGY

## 2025-07-21 PROCEDURE — 92523 SPEECH SOUND LANG COMPREHEN: CPT

## 2025-07-21 PROCEDURE — 70551 MRI BRAIN STEM W/O DYE: CPT

## 2025-07-21 PROCEDURE — 99232 SBSQ HOSP IP/OBS MODERATE 35: CPT | Performed by: PSYCHIATRY & NEUROLOGY

## 2025-07-21 PROCEDURE — 83036 HEMOGLOBIN GLYCOSYLATED A1C: CPT

## 2025-07-21 PROCEDURE — 80069 RENAL FUNCTION PANEL: CPT

## 2025-07-21 PROCEDURE — 36415 COLL VENOUS BLD VENIPUNCTURE: CPT

## 2025-07-21 PROCEDURE — 92610 EVALUATE SWALLOWING FUNCTION: CPT

## 2025-07-21 PROCEDURE — 94760 N-INVAS EAR/PLS OXIMETRY 1: CPT

## 2025-07-21 PROCEDURE — 97163 PT EVAL HIGH COMPLEX 45 MIN: CPT

## 2025-07-21 PROCEDURE — 85025 COMPLETE CBC W/AUTO DIFF WBC: CPT

## 2025-07-21 PROCEDURE — 2580000003 HC RX 258: Performed by: STUDENT IN AN ORGANIZED HEALTH CARE EDUCATION/TRAINING PROGRAM

## 2025-07-21 PROCEDURE — 95816 EEG AWAKE AND DROWSY: CPT

## 2025-07-21 PROCEDURE — 6360000002 HC RX W HCPCS: Performed by: PSYCHIATRY & NEUROLOGY

## 2025-07-21 PROCEDURE — 97167 OT EVAL HIGH COMPLEX 60 MIN: CPT

## 2025-07-21 PROCEDURE — 6360000002 HC RX W HCPCS: Performed by: STUDENT IN AN ORGANIZED HEALTH CARE EDUCATION/TRAINING PROGRAM

## 2025-07-21 PROCEDURE — 94761 N-INVAS EAR/PLS OXIMETRY MLT: CPT

## 2025-07-21 PROCEDURE — 83735 ASSAY OF MAGNESIUM: CPT

## 2025-07-21 PROCEDURE — 80061 LIPID PANEL: CPT

## 2025-07-21 PROCEDURE — APPSS30 APP SPLIT SHARED TIME 16-30 MINUTES: Performed by: NURSE PRACTITIONER

## 2025-07-21 PROCEDURE — 6370000000 HC RX 637 (ALT 250 FOR IP): Performed by: STUDENT IN AN ORGANIZED HEALTH CARE EDUCATION/TRAINING PROGRAM

## 2025-07-21 PROCEDURE — 6360000002 HC RX W HCPCS: Performed by: INTERNAL MEDICINE

## 2025-07-21 RX ORDER — PANTOPRAZOLE SODIUM 40 MG/1
40 TABLET, DELAYED RELEASE ORAL
Status: DISCONTINUED | OUTPATIENT
Start: 2025-07-21 | End: 2025-07-28 | Stop reason: HOSPADM

## 2025-07-21 RX ORDER — VENLAFAXINE HYDROCHLORIDE 150 MG/1
150 CAPSULE, EXTENDED RELEASE ORAL DAILY
Status: DISCONTINUED | OUTPATIENT
Start: 2025-07-21 | End: 2025-07-28 | Stop reason: HOSPADM

## 2025-07-21 RX ORDER — SODIUM CHLORIDE 0.9 % (FLUSH) 0.9 %
5-40 SYRINGE (ML) INJECTION EVERY 12 HOURS SCHEDULED
Status: DISCONTINUED | OUTPATIENT
Start: 2025-07-21 | End: 2025-07-28 | Stop reason: HOSPADM

## 2025-07-21 RX ORDER — LABETALOL HYDROCHLORIDE 5 MG/ML
10 INJECTION, SOLUTION INTRAVENOUS EVERY 4 HOURS PRN
Status: DISCONTINUED | OUTPATIENT
Start: 2025-07-21 | End: 2025-07-28 | Stop reason: HOSPADM

## 2025-07-21 RX ORDER — SODIUM CHLORIDE 0.9 % (FLUSH) 0.9 %
5-40 SYRINGE (ML) INJECTION PRN
Status: DISCONTINUED | OUTPATIENT
Start: 2025-07-21 | End: 2025-07-28 | Stop reason: HOSPADM

## 2025-07-21 RX ORDER — METOPROLOL SUCCINATE 50 MG/1
50 TABLET, EXTENDED RELEASE ORAL DAILY
Status: DISCONTINUED | OUTPATIENT
Start: 2025-07-21 | End: 2025-07-25

## 2025-07-21 RX ORDER — LISINOPRIL 5 MG/1
5 TABLET ORAL DAILY
Status: DISCONTINUED | OUTPATIENT
Start: 2025-07-21 | End: 2025-07-25

## 2025-07-21 RX ORDER — LEVETIRACETAM 500 MG/5ML
750 INJECTION, SOLUTION, CONCENTRATE INTRAVENOUS EVERY 12 HOURS
Status: DISCONTINUED | OUTPATIENT
Start: 2025-07-21 | End: 2025-07-25

## 2025-07-21 RX ORDER — BUDESONIDE AND FORMOTEROL FUMARATE DIHYDRATE 160; 4.5 UG/1; UG/1
2 AEROSOL RESPIRATORY (INHALATION)
Status: DISCONTINUED | OUTPATIENT
Start: 2025-07-21 | End: 2025-07-28 | Stop reason: HOSPADM

## 2025-07-21 RX ORDER — SODIUM CHLORIDE 9 MG/ML
INJECTION, SOLUTION INTRAVENOUS PRN
Status: DISCONTINUED | OUTPATIENT
Start: 2025-07-21 | End: 2025-07-28 | Stop reason: HOSPADM

## 2025-07-21 RX ADMIN — WATER 1000 MG: 1 INJECTION INTRAMUSCULAR; INTRAVENOUS; SUBCUTANEOUS at 14:22

## 2025-07-21 RX ADMIN — METHYLPREDNISOLONE SODIUM SUCCINATE 40 MG: 40 INJECTION, POWDER, LYOPHILIZED, FOR SOLUTION INTRAMUSCULAR; INTRAVENOUS at 05:00

## 2025-07-21 RX ADMIN — BUDESONIDE AND FORMOTEROL FUMARATE DIHYDRATE 2 PUFF: 160; 4.5 AEROSOL RESPIRATORY (INHALATION) at 09:15

## 2025-07-21 RX ADMIN — IPRATROPIUM BROMIDE AND ALBUTEROL SULFATE 1 DOSE: .5; 2.5 SOLUTION RESPIRATORY (INHALATION) at 07:46

## 2025-07-21 RX ADMIN — HYDRALAZINE HYDROCHLORIDE 5 MG: 20 INJECTION INTRAMUSCULAR; INTRAVENOUS at 08:13

## 2025-07-21 RX ADMIN — METOPROLOL TARTRATE 5 MG: 5 INJECTION INTRAVENOUS at 10:35

## 2025-07-21 RX ADMIN — IPRATROPIUM BROMIDE AND ALBUTEROL SULFATE 1 DOSE: .5; 2.5 SOLUTION RESPIRATORY (INHALATION) at 20:17

## 2025-07-21 RX ADMIN — SODIUM CHLORIDE AND POTASSIUM CHLORIDE: .9; .15 SOLUTION INTRAVENOUS at 10:35

## 2025-07-21 RX ADMIN — ENOXAPARIN SODIUM 40 MG: 100 INJECTION SUBCUTANEOUS at 08:13

## 2025-07-21 RX ADMIN — Medication 10 ML: at 22:51

## 2025-07-21 RX ADMIN — ASPIRIN 300 MG: 300 SUPPOSITORY RECTAL at 08:13

## 2025-07-21 RX ADMIN — IPRATROPIUM BROMIDE AND ALBUTEROL SULFATE 1 DOSE: .5; 2.5 SOLUTION RESPIRATORY (INHALATION) at 14:52

## 2025-07-21 RX ADMIN — LEVETIRACETAM 750 MG: 100 INJECTION INTRAVENOUS at 22:51

## 2025-07-21 RX ADMIN — METHYLPREDNISOLONE SODIUM SUCCINATE 40 MG: 40 INJECTION, POWDER, LYOPHILIZED, FOR SOLUTION INTRAMUSCULAR; INTRAVENOUS at 14:22

## 2025-07-21 RX ADMIN — Medication 10 ML: at 08:14

## 2025-07-21 RX ADMIN — DOXYCYCLINE 100 MG: 100 INJECTION, POWDER, LYOPHILIZED, FOR SOLUTION INTRAVENOUS at 22:54

## 2025-07-21 RX ADMIN — DOXYCYCLINE 100 MG: 100 INJECTION, POWDER, LYOPHILIZED, FOR SOLUTION INTRAVENOUS at 12:24

## 2025-07-21 NOTE — FLOWSHEET NOTE
Secure message sent to Dr. Venegas regarding patient's lethargy, and hypertension. Patient changed to NPO per protocol. New orders received.   Message sent to NP Nadia to speak with sister bedside, Nadia en route.

## 2025-07-21 NOTE — ACP (ADVANCE CARE PLANNING)
Advance Care Planning   Healthcare Decision Maker:    Primary Decision Maker: Nuris Guzman - Brother/Sister - [phone]    Secondary Decision Maker: Fili Iqbal - Child - [phone]    Click here to complete Healthcare Decision Makers including selection of the Healthcare Decision Maker Relationship (ie \"Primary\").  Today we documented Decision Maker(s) consistent with Legal Next of Kin hierarchy.       If the relationship to the patient does NOT follow our state's Next of Kin hierarchy, the patient MUST complete an ACP Document to allow him/her to act on the patient's behalf. :

## 2025-07-21 NOTE — DISCHARGE INSTR - COC
Continuity of Care Form    Patient Name: Lin Ferrell   :  1955  MRN:  [id number]    Admit date:  2025  Discharge date:  ***    Code Status Order: DNR-CCA   Advance Directives:     Admitting Physician:  Jack Rock MD  PCP: Kobe Duke MD    Discharging Nurse: ***  Discharging Hospital Unit/Room#: W286/W286-01  Discharging Unit Phone Number: ***    Emergency Contact:   Extended Emergency Contact Information  Primary Emergency Contact: ThomasNuris   [facility]  Home Phone: [phone]  Relation: Brother/Sister  Secondary Emergency Contact: Fili Iqbal  Mobile Phone: [phone]  Relation: Child  Preferred language: English    Past Surgical History:  Past Surgical History:   Procedure Laterality Date    HYSTERECTOMY (CERVIX STATUS UNKNOWN)      KNEE SURGERY      left knee total replacement       Immunization History:   Immunization History   Administered Date(s) Administered    COVID-19, PFIZER Bivalent, DO NOT Dilute, (age 12y+), IM, 30 mcg/0.3 mL 2022    COVID-19, PFIZER GRAY top, DO NOT Dilute, (age 12 y+), IM, 30 mcg/0.3 mL 2022    COVID-19, PFIZER PURPLE top, DILUTE for use, (age 12 y+), 30mcg/0.3mL 2021, 2021, 10/21/2021    COVID-19, PFIZER, , (age 12y+), IM, 30mcg/0.3mL 10/15/2023, 03/10/2024, 2024, 2025       Active Problems:  Patient Active Problem List   Diagnosis Code    COPD exacerbation ([facility]) J44.1    Pneumonia in infectious disease J18.9    Stroke determined by clinical assessment ([facility]) I63.9    Encephalopathy acute G93.40       Isolation/Infection:   Isolation            No Isolation          Patient Infection Status    None to display              Nurse Assessment:  Last Vital Signs: BP (!) 159/96   Pulse 92   Temp 98.1 °F (36.7 °C)   Resp 18   Ht 1.666 m (5' 5.59\")   Wt 86.7 kg (191 lb 3.2 oz)   SpO2 93%   BMI 31.25 kg/m²     Last documented pain score (0-10 scale): Pain Level: 0  Last Weight:   Wt Readings from Last 1

## 2025-07-22 ENCOUNTER — APPOINTMENT (OUTPATIENT)
Age: 70
DRG: 100 | End: 2025-07-22
Attending: PSYCHIATRY & NEUROLOGY
Payer: MEDICARE

## 2025-07-22 PROBLEM — J44.9 CHRONIC OBSTRUCTIVE PULMONARY DISEASE (HCC): Status: ACTIVE | Noted: 2021-12-27

## 2025-07-22 PROBLEM — I63.411 CEREBROVASCULAR ACCIDENT (CVA) DUE TO EMBOLISM OF RIGHT MIDDLE CEREBRAL ARTERY (HCC): Status: ACTIVE | Noted: 2025-07-22

## 2025-07-22 LAB
ALBUMIN SERPL-MCNC: 4 G/DL (ref 3.5–4.6)
ANION GAP SERPL CALCULATED.3IONS-SCNC: 16 MEQ/L (ref 9–15)
BASOPHILS # BLD: 0 K/UL (ref 0–0.2)
BASOPHILS NFR BLD: 0.1 %
BUN SERPL-MCNC: 24 MG/DL (ref 8–23)
CALCIUM SERPL-MCNC: 9 MG/DL (ref 8.5–9.9)
CHLORIDE SERPL-SCNC: 102 MEQ/L (ref 95–107)
CO2 SERPL-SCNC: 19 MEQ/L (ref 20–31)
CREAT SERPL-MCNC: 0.55 MG/DL (ref 0.5–0.9)
CRP SERPL HS-MCNC: 4 MG/L (ref 0–5)
ECHO AO ROOT DIAM: 3.3 CM
ECHO AO ROOT INDEX: 1.69 CM/M2
ECHO BSA: 2 M2
ECHO EST RA PRESSURE: 3 MMHG
ECHO LA DIAMETER INDEX: 1.69 CM/M2
ECHO LA DIAMETER: 3.3 CM
ECHO LA TO AORTIC ROOT RATIO: 1
ECHO LA VOL A-L A2C: 47 ML (ref 22–52)
ECHO LA VOL A-L A4C: 62 ML (ref 22–52)
ECHO LA VOL MOD A2C: 44 ML (ref 22–52)
ECHO LA VOL MOD A4C: 58 ML (ref 22–52)
ECHO LA VOLUME AREA LENGTH: 54 ML
ECHO LA VOLUME INDEX A-L A2C: 24 ML/M2 (ref 16–34)
ECHO LA VOLUME INDEX A-L A4C: 32 ML/M2 (ref 16–34)
ECHO LA VOLUME INDEX AREA LENGTH: 28 ML/M2 (ref 16–34)
ECHO LA VOLUME INDEX MOD A2C: 23 ML/M2 (ref 16–34)
ECHO LA VOLUME INDEX MOD A4C: 30 ML/M2 (ref 16–34)
ECHO LV E' LATERAL VELOCITY: 4.07 CM/S
ECHO LV EDV A2C: 39 ML
ECHO LV EDV A4C: 59 ML
ECHO LV EDV BP: 50 ML (ref 56–104)
ECHO LV EDV INDEX A4C: 30 ML/M2
ECHO LV EDV INDEX BP: 26 ML/M2
ECHO LV EDV NDEX A2C: 20 ML/M2
ECHO LV EF PHYSICIAN: 55 %
ECHO LV EJECTION FRACTION A2C: 56 %
ECHO LV EJECTION FRACTION A4C: 62 %
ECHO LV EJECTION FRACTION BIPLANE: 60 % (ref 55–100)
ECHO LV ESV A2C: 17 ML
ECHO LV ESV A4C: 22 ML
ECHO LV ESV BP: 20 ML (ref 19–49)
ECHO LV ESV INDEX A2C: 9 ML/M2
ECHO LV ESV INDEX A4C: 11 ML/M2
ECHO LV ESV INDEX BP: 10 ML/M2
ECHO LV FRACTIONAL SHORTENING: 17 % (ref 28–44)
ECHO LV INTERNAL DIMENSION DIASTOLE INDEX: 2.36 CM/M2
ECHO LV INTERNAL DIMENSION DIASTOLIC: 4.6 CM (ref 3.9–5.3)
ECHO LV INTERNAL DIMENSION SYSTOLIC INDEX: 1.95 CM/M2
ECHO LV INTERNAL DIMENSION SYSTOLIC: 3.8 CM
ECHO LV IVSD: 1.5 CM (ref 0.6–0.9)
ECHO LV IVSS: 1.5 CM
ECHO LV MASS 2D: 270.6 G (ref 67–162)
ECHO LV MASS INDEX 2D: 138.8 G/M2 (ref 43–95)
ECHO LV POSTERIOR WALL DIASTOLIC: 1.4 CM (ref 0.6–0.9)
ECHO LV POSTERIOR WALL SYSTOLIC: 1.7 CM
ECHO LV RELATIVE WALL THICKNESS RATIO: 0.61
ECHO LVOT AREA: 3.1 CM2
ECHO LVOT DIAM: 2 CM
ECHO MV A VELOCITY: 0.91 M/S
ECHO MV E DECELERATION TIME (DT): 170.8 MS
ECHO MV E VELOCITY: 0.6 M/S
ECHO MV E/A RATIO: 0.66
ECHO MV E/E' LATERAL: 14.74
ECHO RIGHT VENTRICULAR SYSTOLIC PRESSURE (RVSP): 16 MMHG
ECHO RV INTERNAL DIMENSION: 2.5 CM
ECHO TV REGURGITANT MAX VELOCITY: 1.8 M/S
ECHO TV REGURGITANT PEAK GRADIENT: 13 MMHG
EOSINOPHIL # BLD: 0 K/UL (ref 0–0.7)
EOSINOPHIL NFR BLD: 0.1 %
ERYTHROCYTE [DISTWIDTH] IN BLOOD BY AUTOMATED COUNT: 15.2 % (ref 11.5–14.5)
ERYTHROCYTE [SEDIMENTATION RATE] IN BLOOD BY WESTERGREN METHOD: 22 MM (ref 0–30)
GLUCOSE SERPL-MCNC: 142 MG/DL (ref 70–99)
HCT VFR BLD AUTO: 43.9 % (ref 37–47)
HGB BLD-MCNC: 14.4 G/DL (ref 12–16)
LYMPHOCYTES # BLD: 0.8 K/UL (ref 1–4.8)
LYMPHOCYTES NFR BLD: 5 %
MAGNESIUM SERPL-MCNC: 1.9 MG/DL (ref 1.7–2.4)
MCH RBC QN AUTO: 28.7 PG (ref 27–31.3)
MCHC RBC AUTO-ENTMCNC: 32.8 % (ref 33–37)
MCV RBC AUTO: 87.5 FL (ref 79.4–94.8)
MONOCYTES # BLD: 0.4 K/UL (ref 0.2–0.8)
MONOCYTES NFR BLD: 2.3 %
NEUTROPHILS # BLD: 15 K/UL (ref 1.4–6.5)
NEUTS SEG NFR BLD: 91.5 %
PHOSPHATE SERPL-MCNC: 3.2 MG/DL (ref 2.3–4.8)
PLATELET # BLD AUTO: 355 K/UL (ref 130–400)
POTASSIUM SERPL-SCNC: 4.1 MEQ/L (ref 3.4–4.9)
RBC # BLD AUTO: 5.02 M/UL (ref 4.2–5.4)
SODIUM SERPL-SCNC: 137 MEQ/L (ref 135–144)
WBC # BLD AUTO: 16.4 K/UL (ref 4.8–10.8)
WHOPPER PROMPT: NORMAL

## 2025-07-22 PROCEDURE — 36415 COLL VENOUS BLD VENIPUNCTURE: CPT

## 2025-07-22 PROCEDURE — APPSS30 APP SPLIT SHARED TIME 16-30 MINUTES: Performed by: NURSE PRACTITIONER

## 2025-07-22 PROCEDURE — 86140 C-REACTIVE PROTEIN: CPT

## 2025-07-22 PROCEDURE — 97110 THERAPEUTIC EXERCISES: CPT

## 2025-07-22 PROCEDURE — 86038 ANTINUCLEAR ANTIBODIES: CPT

## 2025-07-22 PROCEDURE — 6370000000 HC RX 637 (ALT 250 FOR IP): Performed by: INTERNAL MEDICINE

## 2025-07-22 PROCEDURE — 92507 TX SP LANG VOICE COMM INDIV: CPT

## 2025-07-22 PROCEDURE — 83735 ASSAY OF MAGNESIUM: CPT

## 2025-07-22 PROCEDURE — 97535 SELF CARE MNGMENT TRAINING: CPT

## 2025-07-22 PROCEDURE — 2580000003 HC RX 258: Performed by: PSYCHIATRY & NEUROLOGY

## 2025-07-22 PROCEDURE — 94762 N-INVAS EAR/PLS OXIMTRY CONT: CPT

## 2025-07-22 PROCEDURE — 93306 TTE W/DOPPLER COMPLETE: CPT | Performed by: INTERNAL MEDICINE

## 2025-07-22 PROCEDURE — 85025 COMPLETE CBC W/AUTO DIFF WBC: CPT

## 2025-07-22 PROCEDURE — 85652 RBC SED RATE AUTOMATED: CPT

## 2025-07-22 PROCEDURE — 2500000003 HC RX 250 WO HCPCS: Performed by: INTERNAL MEDICINE

## 2025-07-22 PROCEDURE — 6370000000 HC RX 637 (ALT 250 FOR IP): Performed by: STUDENT IN AN ORGANIZED HEALTH CARE EDUCATION/TRAINING PROGRAM

## 2025-07-22 PROCEDURE — 80069 RENAL FUNCTION PANEL: CPT

## 2025-07-22 PROCEDURE — 94761 N-INVAS EAR/PLS OXIMETRY MLT: CPT

## 2025-07-22 PROCEDURE — 6360000002 HC RX W HCPCS: Performed by: INTERNAL MEDICINE

## 2025-07-22 PROCEDURE — 6360000002 HC RX W HCPCS: Performed by: STUDENT IN AN ORGANIZED HEALTH CARE EDUCATION/TRAINING PROGRAM

## 2025-07-22 PROCEDURE — 1210000000 HC MED SURG R&B

## 2025-07-22 PROCEDURE — 92526 ORAL FUNCTION THERAPY: CPT

## 2025-07-22 PROCEDURE — 94640 AIRWAY INHALATION TREATMENT: CPT

## 2025-07-22 PROCEDURE — 2700000000 HC OXYGEN THERAPY PER DAY

## 2025-07-22 PROCEDURE — 93306 TTE W/DOPPLER COMPLETE: CPT

## 2025-07-22 PROCEDURE — 2580000003 HC RX 258: Performed by: STUDENT IN AN ORGANIZED HEALTH CARE EDUCATION/TRAINING PROGRAM

## 2025-07-22 PROCEDURE — 6360000002 HC RX W HCPCS: Performed by: PSYCHIATRY & NEUROLOGY

## 2025-07-22 PROCEDURE — 99223 1ST HOSP IP/OBS HIGH 75: CPT | Performed by: INTERNAL MEDICINE

## 2025-07-22 PROCEDURE — 99232 SBSQ HOSP IP/OBS MODERATE 35: CPT | Performed by: PSYCHIATRY & NEUROLOGY

## 2025-07-22 RX ORDER — IPRATROPIUM BROMIDE AND ALBUTEROL SULFATE 2.5; .5 MG/3ML; MG/3ML
1 SOLUTION RESPIRATORY (INHALATION)
Status: DISCONTINUED | OUTPATIENT
Start: 2025-07-22 | End: 2025-07-23

## 2025-07-22 RX ADMIN — Medication 10 ML: at 21:23

## 2025-07-22 RX ADMIN — METHYLPREDNISOLONE SODIUM SUCCINATE 40 MG: 40 INJECTION, POWDER, LYOPHILIZED, FOR SOLUTION INTRAMUSCULAR; INTRAVENOUS at 14:30

## 2025-07-22 RX ADMIN — SODIUM CHLORIDE AND POTASSIUM CHLORIDE: .9; .15 SOLUTION INTRAVENOUS at 04:29

## 2025-07-22 RX ADMIN — ACYCLOVIR SODIUM 700 MG: 50 INJECTION, SOLUTION INTRAVENOUS at 10:13

## 2025-07-22 RX ADMIN — METHYLPREDNISOLONE SODIUM SUCCINATE 40 MG: 40 INJECTION, POWDER, LYOPHILIZED, FOR SOLUTION INTRAMUSCULAR; INTRAVENOUS at 01:04

## 2025-07-22 RX ADMIN — BUDESONIDE AND FORMOTEROL FUMARATE DIHYDRATE 2 PUFF: 160; 4.5 AEROSOL RESPIRATORY (INHALATION) at 19:13

## 2025-07-22 RX ADMIN — WATER 1000 MG: 1 INJECTION INTRAMUSCULAR; INTRAVENOUS; SUBCUTANEOUS at 14:30

## 2025-07-22 RX ADMIN — BUDESONIDE AND FORMOTEROL FUMARATE DIHYDRATE 2 PUFF: 160; 4.5 AEROSOL RESPIRATORY (INHALATION) at 07:50

## 2025-07-22 RX ADMIN — LEVETIRACETAM 750 MG: 100 INJECTION INTRAVENOUS at 21:22

## 2025-07-22 RX ADMIN — DOXYCYCLINE 100 MG: 100 INJECTION, POWDER, LYOPHILIZED, FOR SOLUTION INTRAVENOUS at 11:33

## 2025-07-22 RX ADMIN — HYDRALAZINE HYDROCHLORIDE 5 MG: 20 INJECTION INTRAMUSCULAR; INTRAVENOUS at 21:22

## 2025-07-22 RX ADMIN — ACYCLOVIR SODIUM 700 MG: 50 INJECTION, SOLUTION INTRAVENOUS at 17:58

## 2025-07-22 RX ADMIN — IPRATROPIUM BROMIDE AND ALBUTEROL SULFATE 1 DOSE: .5; 2.5 SOLUTION RESPIRATORY (INHALATION) at 07:49

## 2025-07-22 RX ADMIN — DOXYCYCLINE 100 MG: 100 INJECTION, POWDER, LYOPHILIZED, FOR SOLUTION INTRAVENOUS at 23:31

## 2025-07-22 RX ADMIN — LEVETIRACETAM 750 MG: 100 INJECTION INTRAVENOUS at 11:22

## 2025-07-22 RX ADMIN — HYDRALAZINE HYDROCHLORIDE 5 MG: 20 INJECTION INTRAMUSCULAR; INTRAVENOUS at 01:04

## 2025-07-22 RX ADMIN — IPRATROPIUM BROMIDE AND ALBUTEROL SULFATE 1 DOSE: .5; 2.5 SOLUTION RESPIRATORY (INHALATION) at 19:13

## 2025-07-22 RX ADMIN — SODIUM CHLORIDE AND POTASSIUM CHLORIDE: .9; .15 SOLUTION INTRAVENOUS at 22:12

## 2025-07-22 NOTE — FLOWSHEET NOTE
3642 spoke with Stacie WISE, regarding lumbar puncture consult to IR. Informed there is no coverage today for IR procedures.  Tomorrow 7/23, IR will have a radiologist that could perform the procedure, if consent is obtained.     5915 spoke with pt's sister, Nuris, via telephone.  Lumbar puncture procedure explained, answered questions.  Consent and DNR addendum form obtained with Stacie WISE present.

## 2025-07-23 ENCOUNTER — APPOINTMENT (OUTPATIENT)
Dept: INTERVENTIONAL RADIOLOGY/VASCULAR | Age: 70
DRG: 100 | End: 2025-07-23
Attending: INTERNAL MEDICINE
Payer: MEDICARE

## 2025-07-23 LAB
ALBUMIN SERPL-MCNC: 3.9 G/DL (ref 3.5–4.6)
ANION GAP SERPL CALCULATED.3IONS-SCNC: 14 MEQ/L (ref 9–15)
APPEARANCE CSF: CLEAR
BACTERIA BLD CULT ORG #2: NORMAL
BACTERIA BLD CULT: NORMAL
BASOPHILS # BLD: 0 K/UL (ref 0–0.2)
BASOPHILS NFR BLD: 0.1 %
BUN SERPL-MCNC: 27 MG/DL (ref 8–23)
CALCIUM SERPL-MCNC: 9.1 MG/DL (ref 8.5–9.9)
CHLORIDE SERPL-SCNC: 101 MEQ/L (ref 95–107)
CLOT EVALUATION CSF: NORMAL
CO2 SERPL-SCNC: 19 MEQ/L (ref 20–31)
COLOR CSF: COLORLESS
CREAT SERPL-MCNC: 0.5 MG/DL (ref 0.5–0.9)
CRYPTOC AG CSF QL: NEGATIVE
EOSINOPHIL # BLD: 0.1 K/UL (ref 0–0.7)
EOSINOPHIL NFR BLD: 0.5 %
ERYTHROCYTE [DISTWIDTH] IN BLOOD BY AUTOMATED COUNT: 14.9 % (ref 11.5–14.5)
GLUCOSE CSF-MCNC: 84 MG/DL (ref 50–70)
GLUCOSE SERPL-MCNC: 125 MG/DL (ref 70–99)
GRAM STN SPEC: NORMAL
HCT VFR BLD AUTO: 45.9 % (ref 37–47)
HGB BLD-MCNC: 15.1 G/DL (ref 12–16)
LYMPHOCYTES # BLD: 0.9 K/UL (ref 1–4.8)
LYMPHOCYTES NFR BLD: 4.6 %
MAGNESIUM SERPL-MCNC: 2.1 MG/DL (ref 1.7–2.4)
MCH RBC QN AUTO: 29 PG (ref 27–31.3)
MCHC RBC AUTO-ENTMCNC: 32.9 % (ref 33–37)
MCV RBC AUTO: 88.1 FL (ref 79.4–94.8)
MONOCYTES # BLD: 0.7 K/UL (ref 0.2–0.8)
MONOCYTES NFR BLD: 3.6 %
NEUTROPHILS # BLD: 17.4 K/UL (ref 1.4–6.5)
NEUTS SEG NFR BLD: 90.6 %
NO DIFFERENTIAL CSF: NORMAL
PHOSPHATE SERPL-MCNC: 3.4 MG/DL (ref 2.3–4.8)
PLATELET # BLD AUTO: 382 K/UL (ref 130–400)
POTASSIUM SERPL-SCNC: 4.2 MEQ/L (ref 3.4–4.9)
PROCALCITONIN SERPL IA-MCNC: <0.02 NG/ML (ref 0–0.15)
PROT CSF-MCNC: 48 MG/DL (ref 15–45)
RBC # BLD AUTO: 5.21 M/UL (ref 4.2–5.4)
RBC CSF: <2000 K/UL
SODIUM SERPL-SCNC: 134 MEQ/L (ref 135–144)
TUBE NUMBER CSF: NORMAL
VOLUME CSF: NORMAL ML
WBC # BLD AUTO: 19.2 K/UL (ref 4.8–10.8)
WBC CSF: 3 K/UL (ref 0–8)

## 2025-07-23 PROCEDURE — 87205 SMEAR GRAM STAIN: CPT

## 2025-07-23 PROCEDURE — 97530 THERAPEUTIC ACTIVITIES: CPT

## 2025-07-23 PROCEDURE — 86255 FLUORESCENT ANTIBODY SCREEN: CPT

## 2025-07-23 PROCEDURE — 86789 WEST NILE VIRUS ANTIBODY: CPT

## 2025-07-23 PROCEDURE — 85025 COMPLETE CBC W/AUTO DIFF WBC: CPT

## 2025-07-23 PROCEDURE — 6360000002 HC RX W HCPCS: Performed by: RADIOLOGY

## 2025-07-23 PROCEDURE — 84157 ASSAY OF PROTEIN OTHER: CPT

## 2025-07-23 PROCEDURE — 2500000003 HC RX 250 WO HCPCS: Performed by: INTERNAL MEDICINE

## 2025-07-23 PROCEDURE — 94640 AIRWAY INHALATION TREATMENT: CPT

## 2025-07-23 PROCEDURE — 80069 RENAL FUNCTION PANEL: CPT

## 2025-07-23 PROCEDURE — 009U3ZX DRAINAGE OF SPINAL CANAL, PERCUTANEOUS APPROACH, DIAGNOSTIC: ICD-10-PCS | Performed by: INTERNAL MEDICINE

## 2025-07-23 PROCEDURE — 6370000000 HC RX 637 (ALT 250 FOR IP): Performed by: NURSE PRACTITIONER

## 2025-07-23 PROCEDURE — 82945 GLUCOSE OTHER FLUID: CPT

## 2025-07-23 PROCEDURE — 36415 COLL VENOUS BLD VENIPUNCTURE: CPT

## 2025-07-23 PROCEDURE — 2700000000 HC OXYGEN THERAPY PER DAY

## 2025-07-23 PROCEDURE — 87899 AGENT NOS ASSAY W/OPTIC: CPT

## 2025-07-23 PROCEDURE — 93005 ELECTROCARDIOGRAM TRACING: CPT | Performed by: INTERNAL MEDICINE

## 2025-07-23 PROCEDURE — 6360000002 HC RX W HCPCS: Performed by: STUDENT IN AN ORGANIZED HEALTH CARE EDUCATION/TRAINING PROGRAM

## 2025-07-23 PROCEDURE — 99232 SBSQ HOSP IP/OBS MODERATE 35: CPT | Performed by: INTERNAL MEDICINE

## 2025-07-23 PROCEDURE — 86788 WEST NILE VIRUS AB IGM: CPT

## 2025-07-23 PROCEDURE — 87070 CULTURE OTHR SPECIMN AEROBIC: CPT

## 2025-07-23 PROCEDURE — 86317 IMMUNOASSAY INFECTIOUS AGENT: CPT

## 2025-07-23 PROCEDURE — 2580000003 HC RX 258: Performed by: PSYCHIATRY & NEUROLOGY

## 2025-07-23 PROCEDURE — 6360000002 HC RX W HCPCS

## 2025-07-23 PROCEDURE — 6370000000 HC RX 637 (ALT 250 FOR IP): Performed by: STUDENT IN AN ORGANIZED HEALTH CARE EDUCATION/TRAINING PROGRAM

## 2025-07-23 PROCEDURE — 6360000002 HC RX W HCPCS: Performed by: PSYCHIATRY & NEUROLOGY

## 2025-07-23 PROCEDURE — 89050 BODY FLUID CELL COUNT: CPT

## 2025-07-23 PROCEDURE — 6360000002 HC RX W HCPCS: Performed by: INTERNAL MEDICINE

## 2025-07-23 PROCEDURE — 94761 N-INVAS EAR/PLS OXIMETRY MLT: CPT

## 2025-07-23 PROCEDURE — 92526 ORAL FUNCTION THERAPY: CPT

## 2025-07-23 PROCEDURE — 2709999900 IR LUMBAR PUNCTURE FOR DIAGNOSIS

## 2025-07-23 PROCEDURE — 2500000003 HC RX 250 WO HCPCS

## 2025-07-23 PROCEDURE — 2580000003 HC RX 258: Performed by: STUDENT IN AN ORGANIZED HEALTH CARE EDUCATION/TRAINING PROGRAM

## 2025-07-23 PROCEDURE — 86341 ISLET CELL ANTIBODY: CPT

## 2025-07-23 PROCEDURE — 0035U NEURO CSF PRION PRTN QUAL: CPT

## 2025-07-23 PROCEDURE — 99223 1ST HOSP IP/OBS HIGH 75: CPT | Performed by: INTERNAL MEDICINE

## 2025-07-23 PROCEDURE — 6370000000 HC RX 637 (ALT 250 FOR IP): Performed by: INTERNAL MEDICINE

## 2025-07-23 PROCEDURE — APPSS15 APP SPLIT SHARED TIME 0-15 MINUTES: Performed by: NURSE PRACTITIONER

## 2025-07-23 PROCEDURE — 83735 ASSAY OF MAGNESIUM: CPT

## 2025-07-23 PROCEDURE — 84145 PROCALCITONIN (PCT): CPT

## 2025-07-23 PROCEDURE — 1210000000 HC MED SURG R&B

## 2025-07-23 PROCEDURE — 62328 DX LMBR SPI PNXR W/FLUOR/CT: CPT

## 2025-07-23 RX ORDER — LIDOCAINE HYDROCHLORIDE 20 MG/ML
INJECTION, SOLUTION INFILTRATION; PERINEURAL PRN
Status: COMPLETED | OUTPATIENT
Start: 2025-07-23 | End: 2025-07-23

## 2025-07-23 RX ORDER — BACTERIOSTATIC WATER 1 ML/ML
INJECTION, SOLUTION INTRAMUSCULAR; INTRAVENOUS; SUBCUTANEOUS
Status: COMPLETED
Start: 2025-07-23 | End: 2025-07-23

## 2025-07-23 RX ORDER — CALCIUM CARBONATE 500 MG/1
500 TABLET, CHEWABLE ORAL 3 TIMES DAILY PRN
Status: DISCONTINUED | OUTPATIENT
Start: 2025-07-23 | End: 2025-07-28 | Stop reason: HOSPADM

## 2025-07-23 RX ORDER — ENOXAPARIN SODIUM 100 MG/ML
1 INJECTION SUBCUTANEOUS 2 TIMES DAILY
Status: DISCONTINUED | OUTPATIENT
Start: 2025-07-23 | End: 2025-07-25

## 2025-07-23 RX ORDER — METOPROLOL TARTRATE 1 MG/ML
5 INJECTION, SOLUTION INTRAVENOUS EVERY 6 HOURS PRN
Status: DISCONTINUED | OUTPATIENT
Start: 2025-07-23 | End: 2025-07-28 | Stop reason: HOSPADM

## 2025-07-23 RX ADMIN — BACTERIOSTATIC WATER 30 ML: 1 INJECTION, SOLUTION INTRAMUSCULAR; INTRAVENOUS; SUBCUTANEOUS at 15:18

## 2025-07-23 RX ADMIN — ANTACID TABLETS 500 MG: 500 TABLET, CHEWABLE ORAL at 21:22

## 2025-07-23 RX ADMIN — SODIUM CHLORIDE AND POTASSIUM CHLORIDE: .9; .15 SOLUTION INTRAVENOUS at 21:38

## 2025-07-23 RX ADMIN — IPRATROPIUM BROMIDE AND ALBUTEROL SULFATE 1 DOSE: .5; 2.5 SOLUTION RESPIRATORY (INHALATION) at 07:16

## 2025-07-23 RX ADMIN — LEVETIRACETAM 750 MG: 100 INJECTION INTRAVENOUS at 11:22

## 2025-07-23 RX ADMIN — Medication 10 ML: at 21:23

## 2025-07-23 RX ADMIN — WATER 1000 MG: 1 INJECTION INTRAMUSCULAR; INTRAVENOUS; SUBCUTANEOUS at 15:18

## 2025-07-23 RX ADMIN — IPRATROPIUM BROMIDE AND ALBUTEROL SULFATE 1 DOSE: .5; 2.5 SOLUTION RESPIRATORY (INHALATION) at 20:21

## 2025-07-23 RX ADMIN — Medication 10 ML: at 09:33

## 2025-07-23 RX ADMIN — ENOXAPARIN SODIUM 90 MG: 100 INJECTION SUBCUTANEOUS at 21:22

## 2025-07-23 RX ADMIN — ATORVASTATIN CALCIUM 80 MG: 80 TABLET, FILM COATED ORAL at 21:21

## 2025-07-23 RX ADMIN — METHYLPREDNISOLONE SODIUM SUCCINATE 40 MG: 40 INJECTION, POWDER, LYOPHILIZED, FOR SOLUTION INTRAMUSCULAR; INTRAVENOUS at 15:18

## 2025-07-23 RX ADMIN — METOPROLOL TARTRATE 5 MG: 5 INJECTION INTRAVENOUS at 12:08

## 2025-07-23 RX ADMIN — DOXYCYCLINE 100 MG: 100 INJECTION, POWDER, LYOPHILIZED, FOR SOLUTION INTRAVENOUS at 12:35

## 2025-07-23 RX ADMIN — ACYCLOVIR SODIUM 700 MG: 50 INJECTION, SOLUTION INTRAVENOUS at 18:09

## 2025-07-23 RX ADMIN — BUDESONIDE AND FORMOTEROL FUMARATE DIHYDRATE 2 PUFF: 160; 4.5 AEROSOL RESPIRATORY (INHALATION) at 20:21

## 2025-07-23 RX ADMIN — LIDOCAINE HYDROCHLORIDE 5 ML: 20 INJECTION, SOLUTION INFILTRATION; PERINEURAL at 10:27

## 2025-07-23 RX ADMIN — METOPROLOL TARTRATE 5 MG: 5 INJECTION INTRAVENOUS at 22:59

## 2025-07-23 RX ADMIN — LEVETIRACETAM 750 MG: 100 INJECTION INTRAVENOUS at 21:22

## 2025-07-23 RX ADMIN — BUDESONIDE AND FORMOTEROL FUMARATE DIHYDRATE 2 PUFF: 160; 4.5 AEROSOL RESPIRATORY (INHALATION) at 07:16

## 2025-07-23 RX ADMIN — ACYCLOVIR SODIUM 700 MG: 50 INJECTION, SOLUTION INTRAVENOUS at 02:15

## 2025-07-23 RX ADMIN — ACYCLOVIR SODIUM 700 MG: 50 INJECTION, SOLUTION INTRAVENOUS at 11:22

## 2025-07-23 RX ADMIN — METHYLPREDNISOLONE SODIUM SUCCINATE 40 MG: 40 INJECTION, POWDER, LYOPHILIZED, FOR SOLUTION INTRAMUSCULAR; INTRAVENOUS at 02:18

## 2025-07-23 NOTE — FLOWSHEET NOTE
Call from monitor room Pt in SVT 170s. This RN in to assess pt, pt asymptomatic no SOB no chest pain. PRN Lopressor 5mg given. MD notified.     1218 HR 77 /89. Pt requesting to sit up, reminded pt that she has to lay flat til 1500. Pt verbalizes understanding. Resting comfortably in bed with sister at bedside.

## 2025-07-23 NOTE — FLOWSHEET NOTE
Pt returned from LP, pt to lay flat for 4 hours. VSS, pt comfortable, sister at bedside. No needs at this time. Bed in low position, call light within reach.

## 2025-07-23 NOTE — FLOWSHEET NOTE
0930 report received from Nisreen RN,   Pt alert to self.  Asa and Lovenox has been held.   Consent was obtained on 7/22 from pts sister, Nuris.   States pt had a rapid response called for SVT.  Pt HR low 100's at this time. Lopressor to be given for HR above 110.   Ok to proceed with lumbar puncture in IR by Dr Venegas.

## 2025-07-23 NOTE — SIGNIFICANT EVENT
RAPID RESPONSE WAS CALLED FOR SVT 140S  NOW PT IS IN SINUS, NPO DID NOT RECEIVED LOPRESSOR, WILL GIVE iV LOPRESSOR PRN, PT IS SINUS TACHY  HR, PT DENIES SYMPTOMS OF CHEST PAIN  WILL DO EKG  LOPRESSOR iv PRN  CHECK POTASSIUM AND MAG  HEENT: AT/NC, PERRLA, no JVD  HEART: s1/s2 wnl w/o s3  LUNG: clear  ABD: soft, NT  EXT: no edema  SKin : no rash  Neuro:no focal deficits  Tct 35 MIN

## 2025-07-23 NOTE — CONSULTS
Subjective:      Patient ID: Lin Ferrell is a 70 y.o. female who presents today for encephalopathy.  Routine consultation    HPI 70-year-old right-handed female admitted with an encephalopathy.  We were consulted on routine consultation which I received this morning.  Patient initially came to [facility] after ambulance was called by the family for suspected UTI.  Patient was on the toilet for unknown time though reported to be 1 to 2 hours from the notes.  Detailed other history is unclear regarding how long she would have been on the toilet and was found by her sister.   History further was taken by our nurse from the sister who reported that they live together and she went to find her sister in the house and found her sitting on the toilet.  She was left there for about 2 hours so that the timing is unknown.  She was not verbally responsive    Prior to this patient has not been feeling well for the last week was seen at [facility] diagnosed with diverticulitis and sent home with a course of Flagyl and Cipro    At this time patient is encephalopathic and quite aphasic and does not follow commands  Review of Systems   Unable to perform ROS: Patient nonverbal       Past Medical History:   Diagnosis Date    Asthma     Cerebral artery occlusion with cerebral infarction (HCC)     8/2021    Depression     Migraine     Thyroid disease     hypothyroid     Past Surgical History:   Procedure Laterality Date    HYSTERECTOMY (CERVIX STATUS UNKNOWN)      KNEE SURGERY      left knee total replacement     Social History     Socioeconomic History    Marital status:      Spouse name: Not on file    Number of children: Not on file    Years of education: Not on file    Highest education level: Not on file   Occupational History    Not on file   Tobacco Use    Smoking status: Every Day     Types: Cigarettes    Smokeless tobacco: Never    Tobacco comments:     \"a cigarette and a half a day\"   Vaping Use    Vaping 
EVALUATION OF THE CAROTID ARTERIES 7/20/2025 TECHNIQUE: Duplex ultrasound using B-mode/gray scaled imaging, Doppler spectral analysis and color flow Doppler was obtained of the carotid arteries. COMPARISON: None. HISTORY: ORDERING SYSTEM PROVIDED HISTORY: CVA TECHNOLOGIST PROVIDED HISTORY: What reading provider will be dictating this exam?->CRC FINDINGS: There is mild plaque in the visualized segments of the right common carotid artery, internal carotid artery and bulb resulting in  less than 50% stenosis. The peak systolic and end-diastolic velocities in the right CCA, proximal ICA and ECA are 236/15, 56/11 and 170/36 cm/s. The ICA/CCA ratio is 0.6  . The external carotid artery is patent with normal flow.  The right vertebral artery is not visualized. There is mild plaque in the visualized segments of the left common carotid artery and bulb.  The left internal carotid artery was not visualized due to combative patient. The peak systolic and end-diastolic velocities in the left CCA and ECA are 113/19 and  125/16 cm/s. The ICA/CCA ratio was not calculated.  The external carotid artery is patent with normal flow.  The left vertebral artery is not visualized. There is limited visualization of the bilateral internal carotid arteries due to patient resistance and motion.  The exam was terminated early.     1.  There is limited visualization of the bilateral carotid arteries due to patient resistance and motion.  The study is essentially nondiagnostic. RECOMMENDATIONS: ICA       Plaque       ICA/CCA     Degree of PSV      Estimate     PSV Ratio    stenosis <180    No plaque      <2.0          Normal <180  <50% plaque    <2.0           <50% 180-230  >50% plaque  2.0 - 4.0    50-69% >230    >50% plaque      >4.0         >70% ** -180 cm/sec and ICA/CCA PSV Ratio = 2.0 is also consistent with 50-69% stenosis.       Assessment and  plan:     Acute metabolic encephalopathy  Right basilar infiltration likely atelectasis 
FINDINGS: BRAIN AND VENTRICLES: No sign of acute infarction. Moderate-sized old lacunar infarction of the midportion of the left putamen, unchanged. A small old cortical infarct is present in the left mid-posterior high parietal region, a small distal mid-posterior left MCA superior division infarct. No acute hemorrhage. No mass. No midline shift. No hydrocephalus. The sella is empty, a common incidental finding in a patient of this age. Normal flow voids. ORBITS: No acute abnormality. SINUSES AND MASTOIDS: No acute abnormality. BONES AND SOFT TISSUES: Normal marrow signal. No acute soft tissue abnormality.     1. No acute intracranial abnormality. 2. Stable moderate-sized old lacunar infarction of the midportion of the left putamen. 3. Small old distal mid-posterior left MCA superior division critical infarct.     Vascular duplex carotid bilateral  Result Date: 7/21/2025  EXAMINATION: ULTRASOUND EVALUATION OF THE CAROTID ARTERIES 7/20/2025 TECHNIQUE: Duplex ultrasound using B-mode/gray scaled imaging, Doppler spectral analysis and color flow Doppler was obtained of the carotid arteries. COMPARISON: None. HISTORY: ORDERING SYSTEM PROVIDED HISTORY: CVA TECHNOLOGIST PROVIDED HISTORY: What reading provider will be dictating this exam?->CRC FINDINGS: There is mild plaque in the visualized segments of the right common carotid artery, internal carotid artery and bulb resulting in  less than 50% stenosis. The peak systolic and end-diastolic velocities in the right CCA, proximal ICA and ECA are 236/15, 56/11 and 170/36 cm/s. The ICA/CCA ratio is 0.6  . The external carotid artery is patent with normal flow.  The right vertebral artery is not visualized. There is mild plaque in the visualized segments of the left common carotid artery and bulb.  The left internal carotid artery was not visualized due to combative patient. The peak systolic and end-diastolic velocities in the left CCA and ECA are 113/19 and  125/16 cm/s. The

## 2025-07-23 NOTE — OR NURSING
NO SEDATION        Pt arrived to specials dept via bed, 2L NC and telemetry. Pt A&Ox2, reoriented to date and situation and pt follows simple commands and cooperative. Consent confirmed for Lumbar Puncture. Pt removed from telemetry and onto IR monitor, VSS. Monitor room notified. Pt transferred onto procedure table and positioned prone position. VSS.     Dr. Ann marked lumbar site using fluoro.    Timeout complete.     Using sterile technique, Dr. Ann prepped area with iodione and draped area.     Dr. Ann numbed site with 2% lidocaine, see eMar.     Dr. Ann obtained access using 20G spinal needle and AP and lateral fluoro imaging used to confirm placement.      CSF fluid, clear and colorless, obtained. Total of 16.5 ml CSF collected and placed into 5 separate specimen tubes labeled with numbers one through five, with one of the tubes, labeled number 2, being an Alzheimer's disease marker CSF collection tube.      Dr. Ann removed needle and large band aid applied. Site soft and no drainage or swelling assessed. Pt transferred back onto cart with head flat. Report called to Nisreen WISE on 2WT. Telemetry reapplied, monitor room notified. Transport took pt via cart back to [facility]. Pt to lay flat for four hours post LP. Pt tolerated all well. Specimens taken to lab by Елена WISE.Electronically signed by Sharda Deng RN on 7/23/2025 at 10:51 AM

## 2025-07-24 ENCOUNTER — TELEPHONE (OUTPATIENT)
Age: 70
End: 2025-07-24

## 2025-07-24 PROBLEM — I48.91 NEW ONSET A-FIB (HCC): Status: ACTIVE | Noted: 2025-07-24

## 2025-07-24 LAB
ANION GAP SERPL CALCULATED.3IONS-SCNC: 13 MEQ/L (ref 9–15)
ANISOCYTOSIS BLD QL SMEAR: ABNORMAL
BASOPHILS # BLD: 0 K/UL (ref 0–0.2)
BASOPHILS NFR BLD: 0.1 %
BUN SERPL-MCNC: 31 MG/DL (ref 8–23)
CALCIUM SERPL-MCNC: 8.9 MG/DL (ref 8.5–9.9)
CHLORIDE SERPL-SCNC: 101 MEQ/L (ref 95–107)
CO2 SERPL-SCNC: 17 MEQ/L (ref 20–31)
CREAT SERPL-MCNC: 0.7 MG/DL (ref 0.5–0.9)
CRP SERPL HS-MCNC: 4.5 MG/L (ref 0–5)
EKG ATRIAL RATE: 100 BPM
EKG DIAGNOSIS: NORMAL
EKG P AXIS: 68 DEGREES
EKG P-R INTERVAL: 138 MS
EKG Q-T INTERVAL: 400 MS
EKG QRS DURATION: 126 MS
EKG QTC CALCULATION (BAZETT): 516 MS
EKG R AXIS: 262 DEGREES
EKG T AXIS: 49 DEGREES
EKG VENTRICULAR RATE: 100 BPM
EOSINOPHIL # BLD: 0 K/UL (ref 0–0.7)
EOSINOPHIL NFR BLD: 0.4 %
ERYTHROCYTE [DISTWIDTH] IN BLOOD BY AUTOMATED COUNT: 14.9 % (ref 11.5–14.5)
GLUCOSE SERPL-MCNC: 187 MG/DL (ref 70–99)
HCT VFR BLD AUTO: 44.1 % (ref 37–47)
HGB BLD-MCNC: 14.8 G/DL (ref 12–16)
LYMPHOCYTES # BLD: 0.2 K/UL (ref 1–4.8)
LYMPHOCYTES NFR BLD: 1 %
MCH RBC QN AUTO: 29.4 PG (ref 27–31.3)
MCHC RBC AUTO-ENTMCNC: 33.6 % (ref 33–37)
MCV RBC AUTO: 87.5 FL (ref 79.4–94.8)
MONOCYTES # BLD: 0.5 K/UL (ref 0.2–0.8)
MONOCYTES NFR BLD: 1.9 %
NEUTROPHILS # BLD: 23.9 K/UL (ref 1.4–6.5)
NEUTS SEG NFR BLD: 97 %
NUCLEAR IGG SER QL IA: NORMAL
PLATELET # BLD AUTO: 447 K/UL (ref 130–400)
PLATELET BLD QL SMEAR: ABNORMAL
POIKILOCYTOSIS BLD QL SMEAR: ABNORMAL
POTASSIUM SERPL-SCNC: 4.1 MEQ/L (ref 3.4–4.9)
PROCALCITONIN SERPL IA-MCNC: 0.05 NG/ML (ref 0–0.15)
RBC # BLD AUTO: 5.04 M/UL (ref 4.2–5.4)
SLIDE REVIEW: ABNORMAL
SMUDGE CELLS BLD QL SMEAR: 3.8
SODIUM SERPL-SCNC: 131 MEQ/L (ref 135–144)
WBC # BLD AUTO: 24.6 K/UL (ref 4.8–10.8)

## 2025-07-24 PROCEDURE — 2580000003 HC RX 258: Performed by: PSYCHIATRY & NEUROLOGY

## 2025-07-24 PROCEDURE — 6370000000 HC RX 637 (ALT 250 FOR IP): Performed by: INTERNAL MEDICINE

## 2025-07-24 PROCEDURE — 84145 PROCALCITONIN (PCT): CPT

## 2025-07-24 PROCEDURE — 6360000002 HC RX W HCPCS

## 2025-07-24 PROCEDURE — 99232 SBSQ HOSP IP/OBS MODERATE 35: CPT | Performed by: INTERNAL MEDICINE

## 2025-07-24 PROCEDURE — 6370000000 HC RX 637 (ALT 250 FOR IP): Performed by: NURSE PRACTITIONER

## 2025-07-24 PROCEDURE — 86140 C-REACTIVE PROTEIN: CPT

## 2025-07-24 PROCEDURE — 6370000000 HC RX 637 (ALT 250 FOR IP): Performed by: STUDENT IN AN ORGANIZED HEALTH CARE EDUCATION/TRAINING PROGRAM

## 2025-07-24 PROCEDURE — 1210000000 HC MED SURG R&B

## 2025-07-24 PROCEDURE — 2500000003 HC RX 250 WO HCPCS: Performed by: INTERNAL MEDICINE

## 2025-07-24 PROCEDURE — 80048 BASIC METABOLIC PNL TOTAL CA: CPT

## 2025-07-24 PROCEDURE — 85025 COMPLETE CBC W/AUTO DIFF WBC: CPT

## 2025-07-24 PROCEDURE — 6360000002 HC RX W HCPCS: Performed by: STUDENT IN AN ORGANIZED HEALTH CARE EDUCATION/TRAINING PROGRAM

## 2025-07-24 PROCEDURE — APPSS15 APP SPLIT SHARED TIME 0-15 MINUTES: Performed by: NURSE PRACTITIONER

## 2025-07-24 PROCEDURE — 94150 VITAL CAPACITY TEST: CPT

## 2025-07-24 PROCEDURE — 6360000002 HC RX W HCPCS: Performed by: PSYCHIATRY & NEUROLOGY

## 2025-07-24 PROCEDURE — 94640 AIRWAY INHALATION TREATMENT: CPT

## 2025-07-24 PROCEDURE — 99232 SBSQ HOSP IP/OBS MODERATE 35: CPT | Performed by: PSYCHIATRY & NEUROLOGY

## 2025-07-24 PROCEDURE — 94761 N-INVAS EAR/PLS OXIMETRY MLT: CPT

## 2025-07-24 PROCEDURE — 97535 SELF CARE MNGMENT TRAINING: CPT

## 2025-07-24 PROCEDURE — 2580000003 HC RX 258: Performed by: STUDENT IN AN ORGANIZED HEALTH CARE EDUCATION/TRAINING PROGRAM

## 2025-07-24 PROCEDURE — 6360000002 HC RX W HCPCS: Performed by: INTERNAL MEDICINE

## 2025-07-24 PROCEDURE — 2500000003 HC RX 250 WO HCPCS: Performed by: PSYCHIATRY & NEUROLOGY

## 2025-07-24 PROCEDURE — 99233 SBSQ HOSP IP/OBS HIGH 50: CPT | Performed by: INTERNAL MEDICINE

## 2025-07-24 PROCEDURE — 2700000000 HC OXYGEN THERAPY PER DAY

## 2025-07-24 PROCEDURE — 36415 COLL VENOUS BLD VENIPUNCTURE: CPT

## 2025-07-24 RX ORDER — ASPIRIN 81 MG/1
81 TABLET, CHEWABLE ORAL DAILY
Status: DISCONTINUED | OUTPATIENT
Start: 2025-07-24 | End: 2025-07-25

## 2025-07-24 RX ORDER — LACTOBACILLUS RHAMNOSUS GG 10B CELL
1 CAPSULE ORAL
Status: DISCONTINUED | OUTPATIENT
Start: 2025-07-24 | End: 2025-07-28 | Stop reason: HOSPADM

## 2025-07-24 RX ADMIN — METOPROLOL SUCCINATE 50 MG: 50 TABLET, EXTENDED RELEASE ORAL at 09:05

## 2025-07-24 RX ADMIN — LISINOPRIL 5 MG: 5 TABLET ORAL at 09:04

## 2025-07-24 RX ADMIN — PANTOPRAZOLE SODIUM 40 MG: 40 TABLET, DELAYED RELEASE ORAL at 06:00

## 2025-07-24 RX ADMIN — DOXYCYCLINE 100 MG: 100 INJECTION, POWDER, LYOPHILIZED, FOR SOLUTION INTRAVENOUS at 22:33

## 2025-07-24 RX ADMIN — BUDESONIDE AND FORMOTEROL FUMARATE DIHYDRATE 2 PUFF: 160; 4.5 AEROSOL RESPIRATORY (INHALATION) at 19:18

## 2025-07-24 RX ADMIN — ANTACID TABLETS 500 MG: 500 TABLET, CHEWABLE ORAL at 09:29

## 2025-07-24 RX ADMIN — ACYCLOVIR SODIUM 700 MG: 50 INJECTION, SOLUTION INTRAVENOUS at 11:40

## 2025-07-24 RX ADMIN — LEVETIRACETAM 750 MG: 100 INJECTION INTRAVENOUS at 11:31

## 2025-07-24 RX ADMIN — ATORVASTATIN CALCIUM 80 MG: 80 TABLET, FILM COATED ORAL at 22:28

## 2025-07-24 RX ADMIN — SODIUM CHLORIDE, PRESERVATIVE FREE 10 ML: 5 INJECTION INTRAVENOUS at 22:29

## 2025-07-24 RX ADMIN — SODIUM CHLORIDE, PRESERVATIVE FREE 10 ML: 5 INJECTION INTRAVENOUS at 09:05

## 2025-07-24 RX ADMIN — DOXYCYCLINE 100 MG: 100 INJECTION, POWDER, LYOPHILIZED, FOR SOLUTION INTRAVENOUS at 13:03

## 2025-07-24 RX ADMIN — ENOXAPARIN SODIUM 90 MG: 100 INJECTION SUBCUTANEOUS at 22:29

## 2025-07-24 RX ADMIN — WATER 1000 MG: 1 INJECTION INTRAMUSCULAR; INTRAVENOUS; SUBCUTANEOUS at 14:55

## 2025-07-24 RX ADMIN — VENLAFAXINE HYDROCHLORIDE 150 MG: 150 CAPSULE, EXTENDED RELEASE ORAL at 09:05

## 2025-07-24 RX ADMIN — ENOXAPARIN SODIUM 90 MG: 100 INJECTION SUBCUTANEOUS at 09:05

## 2025-07-24 RX ADMIN — LEVETIRACETAM 750 MG: 100 INJECTION INTRAVENOUS at 22:29

## 2025-07-24 RX ADMIN — Medication 1 CAPSULE: at 18:07

## 2025-07-24 RX ADMIN — LEVOTHYROXINE SODIUM 137 MCG: 0.11 TABLET ORAL at 06:00

## 2025-07-24 RX ADMIN — ASPIRIN 81 MG: 81 TABLET, CHEWABLE ORAL at 18:07

## 2025-07-24 RX ADMIN — ACYCLOVIR SODIUM 700 MG: 50 INJECTION, SOLUTION INTRAVENOUS at 03:05

## 2025-07-24 RX ADMIN — BUDESONIDE AND FORMOTEROL FUMARATE DIHYDRATE 2 PUFF: 160; 4.5 AEROSOL RESPIRATORY (INHALATION) at 04:35

## 2025-07-24 RX ADMIN — METHYLPREDNISOLONE SODIUM SUCCINATE 40 MG: 40 INJECTION, POWDER, LYOPHILIZED, FOR SOLUTION INTRAMUSCULAR; INTRAVENOUS at 02:59

## 2025-07-24 RX ADMIN — DOXYCYCLINE 100 MG: 100 INJECTION, POWDER, LYOPHILIZED, FOR SOLUTION INTRAVENOUS at 00:12

## 2025-07-24 ASSESSMENT — PAIN SCALES - GENERAL: PAINLEVEL_OUTOF10: 0

## 2025-07-24 NOTE — TELEPHONE ENCOUNTER
----- Message from Dilshad Rubin PA-C sent at 7/24/2025  3:54 PM EDT -----  Please schedule appointment with Dr. Díaz in 2 to 3 weeks in [place].  Thank you

## 2025-07-25 LAB
ANION GAP SERPL CALCULATED.3IONS-SCNC: 13 MEQ/L (ref 9–15)
BASOPHILS # BLD: 0 K/UL (ref 0–0.2)
BASOPHILS NFR BLD: 0.1 %
BUN SERPL-MCNC: 30 MG/DL (ref 8–23)
CALCIUM SERPL-MCNC: 8.8 MG/DL (ref 8.5–9.9)
CHLORIDE SERPL-SCNC: 105 MEQ/L (ref 95–107)
CO2 SERPL-SCNC: 20 MEQ/L (ref 20–31)
CREAT SERPL-MCNC: 0.68 MG/DL (ref 0.5–0.9)
EOSINOPHIL # BLD: 0.2 K/UL (ref 0–0.7)
EOSINOPHIL NFR BLD: 0.9 %
ERYTHROCYTE [DISTWIDTH] IN BLOOD BY AUTOMATED COUNT: 14.8 % (ref 11.5–14.5)
GLUCOSE SERPL-MCNC: 146 MG/DL (ref 70–99)
HCT VFR BLD AUTO: 41.9 % (ref 37–47)
HGB BLD-MCNC: 13.6 G/DL (ref 12–16)
LYMPHOCYTES # BLD: 1.9 K/UL (ref 1–4.8)
LYMPHOCYTES NFR BLD: 11 %
MCH RBC QN AUTO: 28.5 PG (ref 27–31.3)
MCHC RBC AUTO-ENTMCNC: 32.5 % (ref 33–37)
MCV RBC AUTO: 87.7 FL (ref 79.4–94.8)
MISCELLANEOUS LAB TEST ORDER: NORMAL
MONOCYTES # BLD: 1.4 K/UL (ref 0.2–0.8)
MONOCYTES NFR BLD: 8.4 %
NEUTROPHILS # BLD: 13.5 K/UL (ref 1.4–6.5)
NEUTS SEG NFR BLD: 79 %
PLATELET # BLD AUTO: 420 K/UL (ref 130–400)
POTASSIUM SERPL-SCNC: 3.9 MEQ/L (ref 3.4–4.9)
RBC # BLD AUTO: 4.78 M/UL (ref 4.2–5.4)
SODIUM SERPL-SCNC: 138 MEQ/L (ref 135–144)
WBC # BLD AUTO: 17.1 K/UL (ref 4.8–10.8)
WHOPPER PROMPT: NORMAL

## 2025-07-25 PROCEDURE — 2500000003 HC RX 250 WO HCPCS: Performed by: INTERNAL MEDICINE

## 2025-07-25 PROCEDURE — 36415 COLL VENOUS BLD VENIPUNCTURE: CPT

## 2025-07-25 PROCEDURE — 92507 TX SP LANG VOICE COMM INDIV: CPT

## 2025-07-25 PROCEDURE — 6370000000 HC RX 637 (ALT 250 FOR IP): Performed by: STUDENT IN AN ORGANIZED HEALTH CARE EDUCATION/TRAINING PROGRAM

## 2025-07-25 PROCEDURE — 2580000003 HC RX 258: Performed by: STUDENT IN AN ORGANIZED HEALTH CARE EDUCATION/TRAINING PROGRAM

## 2025-07-25 PROCEDURE — 99232 SBSQ HOSP IP/OBS MODERATE 35: CPT | Performed by: INTERNAL MEDICINE

## 2025-07-25 PROCEDURE — 1210000000 HC MED SURG R&B

## 2025-07-25 PROCEDURE — 2500000003 HC RX 250 WO HCPCS: Performed by: PSYCHIATRY & NEUROLOGY

## 2025-07-25 PROCEDURE — 6360000002 HC RX W HCPCS: Performed by: STUDENT IN AN ORGANIZED HEALTH CARE EDUCATION/TRAINING PROGRAM

## 2025-07-25 PROCEDURE — 6370000000 HC RX 637 (ALT 250 FOR IP): Performed by: NURSE PRACTITIONER

## 2025-07-25 PROCEDURE — 6360000002 HC RX W HCPCS: Performed by: PSYCHIATRY & NEUROLOGY

## 2025-07-25 PROCEDURE — 6370000000 HC RX 637 (ALT 250 FOR IP): Performed by: INTERNAL MEDICINE

## 2025-07-25 PROCEDURE — 97535 SELF CARE MNGMENT TRAINING: CPT

## 2025-07-25 PROCEDURE — 6360000002 HC RX W HCPCS: Performed by: INTERNAL MEDICINE

## 2025-07-25 PROCEDURE — 80048 BASIC METABOLIC PNL TOTAL CA: CPT

## 2025-07-25 PROCEDURE — 85025 COMPLETE CBC W/AUTO DIFF WBC: CPT

## 2025-07-25 PROCEDURE — 94640 AIRWAY INHALATION TREATMENT: CPT

## 2025-07-25 PROCEDURE — 99232 SBSQ HOSP IP/OBS MODERATE 35: CPT | Performed by: PSYCHIATRY & NEUROLOGY

## 2025-07-25 PROCEDURE — APPSS15 APP SPLIT SHARED TIME 0-15 MINUTES: Performed by: NURSE PRACTITIONER

## 2025-07-25 PROCEDURE — 94761 N-INVAS EAR/PLS OXIMETRY MLT: CPT

## 2025-07-25 RX ADMIN — ANTACID TABLETS 500 MG: 500 TABLET, CHEWABLE ORAL at 17:52

## 2025-07-25 RX ADMIN — DOXYCYCLINE 100 MG: 100 INJECTION, POWDER, LYOPHILIZED, FOR SOLUTION INTRAVENOUS at 18:00

## 2025-07-25 RX ADMIN — WATER 1000 MG: 1 INJECTION INTRAMUSCULAR; INTRAVENOUS; SUBCUTANEOUS at 17:53

## 2025-07-25 RX ADMIN — LEVETIRACETAM 750 MG: 100 INJECTION INTRAVENOUS at 09:51

## 2025-07-25 RX ADMIN — LEVETIRACETAM 750 MG: 500 TABLET, FILM COATED ORAL at 22:02

## 2025-07-25 RX ADMIN — ATORVASTATIN CALCIUM 80 MG: 80 TABLET, FILM COATED ORAL at 22:02

## 2025-07-25 RX ADMIN — APIXABAN 5 MG: 5 TABLET, FILM COATED ORAL at 22:02

## 2025-07-25 RX ADMIN — SODIUM CHLORIDE, PRESERVATIVE FREE 10 ML: 5 INJECTION INTRAVENOUS at 09:52

## 2025-07-25 RX ADMIN — BUDESONIDE AND FORMOTEROL FUMARATE DIHYDRATE 2 PUFF: 160; 4.5 AEROSOL RESPIRATORY (INHALATION) at 08:07

## 2025-07-25 RX ADMIN — METOPROLOL TARTRATE 75 MG: 50 TABLET, FILM COATED ORAL at 09:52

## 2025-07-25 RX ADMIN — DOXYCYCLINE 100 MG: 100 INJECTION, POWDER, LYOPHILIZED, FOR SOLUTION INTRAVENOUS at 22:04

## 2025-07-25 RX ADMIN — BUDESONIDE AND FORMOTEROL FUMARATE DIHYDRATE 2 PUFF: 160; 4.5 AEROSOL RESPIRATORY (INHALATION) at 22:26

## 2025-07-25 RX ADMIN — PANTOPRAZOLE SODIUM 40 MG: 40 TABLET, DELAYED RELEASE ORAL at 05:32

## 2025-07-25 RX ADMIN — LEVOTHYROXINE SODIUM 137 MCG: 0.11 TABLET ORAL at 05:30

## 2025-07-25 RX ADMIN — ONDANSETRON 4 MG: 2 INJECTION, SOLUTION INTRAMUSCULAR; INTRAVENOUS at 17:52

## 2025-07-25 RX ADMIN — VENLAFAXINE HYDROCHLORIDE 150 MG: 150 CAPSULE, EXTENDED RELEASE ORAL at 09:52

## 2025-07-25 RX ADMIN — Medication 1 CAPSULE: at 09:52

## 2025-07-25 RX ADMIN — APIXABAN 5 MG: 5 TABLET, FILM COATED ORAL at 09:52

## 2025-07-25 NOTE — FLOWSHEET NOTE
Pt resting in bed, ambulating to bathroom standby w/walker, declined repeat EEG this afternoon after speaking w/Cheema agreed, likely Monday. Msg to cardio and hospitalist re; call for sinus stephan with 2 second pauses (which resolved), sister bedside reporting much improvement today, cont to monitor Electronically signed by Trell Marie RN on 7/25/2025 at 2:50 PM  Call to specials as pt iv painful to touch, multiple other sites bruised and swollen, will come to start ultrasound guided for iv antx Electronically signed by Trell Marie RN on 7/25/2025 at 3:21 PM

## 2025-07-26 LAB
WNV IGG CSF-ACNC: 0.02 IV
WNV IGM CSF-ACNC: 0 IV

## 2025-07-26 PROCEDURE — 6370000000 HC RX 637 (ALT 250 FOR IP): Performed by: INTERNAL MEDICINE

## 2025-07-26 PROCEDURE — 6370000000 HC RX 637 (ALT 250 FOR IP): Performed by: STUDENT IN AN ORGANIZED HEALTH CARE EDUCATION/TRAINING PROGRAM

## 2025-07-26 PROCEDURE — 2500000003 HC RX 250 WO HCPCS: Performed by: PSYCHIATRY & NEUROLOGY

## 2025-07-26 PROCEDURE — 99232 SBSQ HOSP IP/OBS MODERATE 35: CPT | Performed by: INTERNAL MEDICINE

## 2025-07-26 PROCEDURE — 94640 AIRWAY INHALATION TREATMENT: CPT

## 2025-07-26 PROCEDURE — 1210000000 HC MED SURG R&B

## 2025-07-26 PROCEDURE — 94761 N-INVAS EAR/PLS OXIMETRY MLT: CPT

## 2025-07-26 PROCEDURE — 6370000000 HC RX 637 (ALT 250 FOR IP): Performed by: NURSE PRACTITIONER

## 2025-07-26 RX ORDER — METOPROLOL TARTRATE 75 MG/1
75 TABLET ORAL 2 TIMES DAILY
Qty: 60 TABLET | Refills: 3 | Status: SHIPPED | OUTPATIENT
Start: 2025-07-26

## 2025-07-26 RX ADMIN — APIXABAN 5 MG: 5 TABLET, FILM COATED ORAL at 21:11

## 2025-07-26 RX ADMIN — VENLAFAXINE HYDROCHLORIDE 150 MG: 150 CAPSULE, EXTENDED RELEASE ORAL at 09:02

## 2025-07-26 RX ADMIN — SODIUM CHLORIDE, PRESERVATIVE FREE 10 ML: 5 INJECTION INTRAVENOUS at 08:53

## 2025-07-26 RX ADMIN — SODIUM CHLORIDE, PRESERVATIVE FREE 10 ML: 5 INJECTION INTRAVENOUS at 21:11

## 2025-07-26 RX ADMIN — METOPROLOL TARTRATE 75 MG: 50 TABLET, FILM COATED ORAL at 09:02

## 2025-07-26 RX ADMIN — PANTOPRAZOLE SODIUM 40 MG: 40 TABLET, DELAYED RELEASE ORAL at 05:39

## 2025-07-26 RX ADMIN — ATORVASTATIN CALCIUM 80 MG: 80 TABLET, FILM COATED ORAL at 21:11

## 2025-07-26 RX ADMIN — LEVETIRACETAM 750 MG: 500 TABLET, FILM COATED ORAL at 09:02

## 2025-07-26 RX ADMIN — BUDESONIDE AND FORMOTEROL FUMARATE DIHYDRATE 2 PUFF: 160; 4.5 AEROSOL RESPIRATORY (INHALATION) at 20:27

## 2025-07-26 RX ADMIN — LEVOTHYROXINE SODIUM 137 MCG: 0.11 TABLET ORAL at 05:39

## 2025-07-26 RX ADMIN — BUDESONIDE AND FORMOTEROL FUMARATE DIHYDRATE 2 PUFF: 160; 4.5 AEROSOL RESPIRATORY (INHALATION) at 07:21

## 2025-07-26 RX ADMIN — APIXABAN 5 MG: 5 TABLET, FILM COATED ORAL at 09:05

## 2025-07-26 RX ADMIN — Medication 1 CAPSULE: at 08:40

## 2025-07-26 RX ADMIN — ANTACID TABLETS 500 MG: 500 TABLET, CHEWABLE ORAL at 19:42

## 2025-07-26 RX ADMIN — LEVETIRACETAM 750 MG: 500 TABLET, FILM COATED ORAL at 21:11

## 2025-07-27 LAB
BACTERIA CSF CULT: NORMAL
BASOPHILS # BLD: 0 K/UL (ref 0–0.2)
BASOPHILS NFR BLD: 0.1 %
EOSINOPHIL # BLD: 0.6 K/UL (ref 0–0.7)
EOSINOPHIL NFR BLD: 4.1 %
ERYTHROCYTE [DISTWIDTH] IN BLOOD BY AUTOMATED COUNT: 14.6 % (ref 11.5–14.5)
HCT VFR BLD AUTO: 40.9 % (ref 37–47)
HGB BLD-MCNC: 13.2 G/DL (ref 12–16)
HSV1 DNA CSF QL NAA+PROBE: NOT DETECTED
HSV2 DNA CSF QL NAA+PROBE: NOT DETECTED
LYMPHOCYTES # BLD: 2.1 K/UL (ref 1–4.8)
LYMPHOCYTES NFR BLD: 15.7 %
MCH RBC QN AUTO: 28.5 PG (ref 27–31.3)
MCHC RBC AUTO-ENTMCNC: 32.3 % (ref 33–37)
MCV RBC AUTO: 88.3 FL (ref 79.4–94.8)
MONOCYTES # BLD: 1.1 K/UL (ref 0.2–0.8)
MONOCYTES NFR BLD: 8.2 %
NEUTROPHILS # BLD: 9.5 K/UL (ref 1.4–6.5)
NEUTS SEG NFR BLD: 71.3 %
PLATELET # BLD AUTO: 459 K/UL (ref 130–400)
RBC # BLD AUTO: 4.63 M/UL (ref 4.2–5.4)
SPECIMEN SOURCE: NORMAL
WBC # BLD AUTO: 13.3 K/UL (ref 4.8–10.8)

## 2025-07-27 PROCEDURE — 2500000003 HC RX 250 WO HCPCS: Performed by: INTERNAL MEDICINE

## 2025-07-27 PROCEDURE — 36415 COLL VENOUS BLD VENIPUNCTURE: CPT

## 2025-07-27 PROCEDURE — 99232 SBSQ HOSP IP/OBS MODERATE 35: CPT | Performed by: INTERNAL MEDICINE

## 2025-07-27 PROCEDURE — 6370000000 HC RX 637 (ALT 250 FOR IP): Performed by: STUDENT IN AN ORGANIZED HEALTH CARE EDUCATION/TRAINING PROGRAM

## 2025-07-27 PROCEDURE — 6370000000 HC RX 637 (ALT 250 FOR IP): Performed by: INTERNAL MEDICINE

## 2025-07-27 PROCEDURE — 85025 COMPLETE CBC W/AUTO DIFF WBC: CPT

## 2025-07-27 PROCEDURE — 94761 N-INVAS EAR/PLS OXIMETRY MLT: CPT

## 2025-07-27 PROCEDURE — 1210000000 HC MED SURG R&B

## 2025-07-27 PROCEDURE — 2500000003 HC RX 250 WO HCPCS: Performed by: PSYCHIATRY & NEUROLOGY

## 2025-07-27 PROCEDURE — 94640 AIRWAY INHALATION TREATMENT: CPT

## 2025-07-27 PROCEDURE — 94760 N-INVAS EAR/PLS OXIMETRY 1: CPT

## 2025-07-27 PROCEDURE — 6370000000 HC RX 637 (ALT 250 FOR IP): Performed by: NURSE PRACTITIONER

## 2025-07-27 PROCEDURE — 95816 EEG AWAKE AND DROWSY: CPT | Performed by: PSYCHIATRY & NEUROLOGY

## 2025-07-27 RX ADMIN — ATORVASTATIN CALCIUM 80 MG: 80 TABLET, FILM COATED ORAL at 21:31

## 2025-07-27 RX ADMIN — BUDESONIDE AND FORMOTEROL FUMARATE DIHYDRATE 2 PUFF: 160; 4.5 AEROSOL RESPIRATORY (INHALATION) at 19:09

## 2025-07-27 RX ADMIN — METOPROLOL TARTRATE 75 MG: 50 TABLET, FILM COATED ORAL at 09:12

## 2025-07-27 RX ADMIN — LEVETIRACETAM 750 MG: 500 TABLET, FILM COATED ORAL at 09:12

## 2025-07-27 RX ADMIN — BUDESONIDE AND FORMOTEROL FUMARATE DIHYDRATE 2 PUFF: 160; 4.5 AEROSOL RESPIRATORY (INHALATION) at 07:10

## 2025-07-27 RX ADMIN — LEVOTHYROXINE SODIUM 137 MCG: 0.11 TABLET ORAL at 05:11

## 2025-07-27 RX ADMIN — Medication 20 ML: at 21:38

## 2025-07-27 RX ADMIN — Medication 1 CAPSULE: at 09:13

## 2025-07-27 RX ADMIN — SODIUM CHLORIDE, PRESERVATIVE FREE 10 ML: 5 INJECTION INTRAVENOUS at 09:15

## 2025-07-27 RX ADMIN — PANTOPRAZOLE SODIUM 40 MG: 40 TABLET, DELAYED RELEASE ORAL at 05:11

## 2025-07-27 RX ADMIN — APIXABAN 5 MG: 5 TABLET, FILM COATED ORAL at 09:13

## 2025-07-27 RX ADMIN — METOPROLOL TARTRATE 75 MG: 50 TABLET, FILM COATED ORAL at 21:31

## 2025-07-27 RX ADMIN — ANTACID TABLETS 500 MG: 500 TABLET, CHEWABLE ORAL at 14:28

## 2025-07-27 RX ADMIN — APIXABAN 5 MG: 5 TABLET, FILM COATED ORAL at 21:31

## 2025-07-27 RX ADMIN — VENLAFAXINE HYDROCHLORIDE 150 MG: 150 CAPSULE, EXTENDED RELEASE ORAL at 09:13

## 2025-07-27 RX ADMIN — LEVETIRACETAM 750 MG: 500 TABLET, FILM COATED ORAL at 21:31

## 2025-07-27 RX ADMIN — Medication 10 ML: at 09:15

## 2025-07-27 NOTE — PROCEDURES
[facility]                   [address] [place] [address]                          ELECTROENCEPHALOGRAM      PATIENT NAME: JEFF ANTHONY                  : 1955  MED REC NO: [id number]                        ROOM: [id number]  ACCOUNT NO: [id number]                       ADMIT DATE: 2025  PROVIDER: Sherri Anderson MD      REFERRING PHYSICIAN:  ISLEA JOHNSTON    MEDICATIONS:  Synthroid, lisinopril, Toprol, Effexor, Combivent, labetalol, aspirin, and Lipitor.    DESCRIPTION:  This is a spontaneous 20-channel EEG recording for this patient with significant encephalopathy.  The patient's background rhythm shows a 4-6 Hertz theta slowing of high amplitude.  Intermittently, there appears to be suggestion of subtle triphasic waves.  A few intermittent myoclonic jerks notable with a single spike which generalizes.  Record otherwise remains symmetrical.  Photic stimulation was performed without any photic responses.  As the record proceeds, there appears to be a subtle rhythmic change with high-amplitude tracing seen throughout, which starts suddenly and stops suddenly.  These findings may suggest underlying brief seizure.    IMPRESSION:  This is an abnormal EEG recording by the virtue of diffuse slowing with underlying occasional generalized spikes.  An episode occurs where a sudden rhythmic change with high-amplitude tracing is seen, likely to be a brief seizure.  Clinical course recommended. A followup EEG is recommended.          SHERRI ANDERSON MD      D:  2025 12:05:24     T:  2025 14:07:04     LEYLA/CADY  Job #:  [id number]     Doc#:  [id number]

## 2025-07-28 VITALS
SYSTOLIC BLOOD PRESSURE: 118 MMHG | WEIGHT: 191.2 LBS | HEART RATE: 50 BPM | TEMPERATURE: 98.2 F | DIASTOLIC BLOOD PRESSURE: 65 MMHG | BODY MASS INDEX: 30.73 KG/M2 | HEIGHT: 66 IN | RESPIRATION RATE: 16 BRPM | OXYGEN SATURATION: 95 %

## 2025-07-28 LAB
BASOPHILS # BLD: 0 K/UL (ref 0–0.2)
BASOPHILS NFR BLD: 0.2 %
EOSINOPHIL # BLD: 0.6 K/UL (ref 0–0.7)
EOSINOPHIL NFR BLD: 4.6 %
ERYTHROCYTE [DISTWIDTH] IN BLOOD BY AUTOMATED COUNT: 14.5 % (ref 11.5–14.5)
HCT VFR BLD AUTO: 42.7 % (ref 37–47)
HGB BLD-MCNC: 13.8 G/DL (ref 12–16)
LYMPHOCYTES # BLD: 1.9 K/UL (ref 1–4.8)
LYMPHOCYTES NFR BLD: 14.9 %
MCH RBC QN AUTO: 28.9 PG (ref 27–31.3)
MCHC RBC AUTO-ENTMCNC: 32.3 % (ref 33–37)
MCV RBC AUTO: 89.3 FL (ref 79.4–94.8)
MISCELLANEOUS LAB TEST ORDER: NORMAL
MONOCYTES # BLD: 1.1 K/UL (ref 0.2–0.8)
MONOCYTES NFR BLD: 8.5 %
NEUTROPHILS # BLD: 9 K/UL (ref 1.4–6.5)
NEUTS SEG NFR BLD: 71.2 %
PLATELET # BLD AUTO: 500 K/UL (ref 130–400)
RBC # BLD AUTO: 4.78 M/UL (ref 4.2–5.4)
WBC # BLD AUTO: 12.6 K/UL (ref 4.8–10.8)
WHOPPER PROMPT: NORMAL

## 2025-07-28 PROCEDURE — 85025 COMPLETE CBC W/AUTO DIFF WBC: CPT

## 2025-07-28 PROCEDURE — APPSS15 APP SPLIT SHARED TIME 0-15 MINUTES: Performed by: NURSE PRACTITIONER

## 2025-07-28 PROCEDURE — 99232 SBSQ HOSP IP/OBS MODERATE 35: CPT | Performed by: PSYCHIATRY & NEUROLOGY

## 2025-07-28 PROCEDURE — 95816 EEG AWAKE AND DROWSY: CPT

## 2025-07-28 PROCEDURE — 94640 AIRWAY INHALATION TREATMENT: CPT

## 2025-07-28 PROCEDURE — 92526 ORAL FUNCTION THERAPY: CPT

## 2025-07-28 PROCEDURE — 2500000003 HC RX 250 WO HCPCS: Performed by: PSYCHIATRY & NEUROLOGY

## 2025-07-28 PROCEDURE — 36415 COLL VENOUS BLD VENIPUNCTURE: CPT

## 2025-07-28 PROCEDURE — 6370000000 HC RX 637 (ALT 250 FOR IP): Performed by: STUDENT IN AN ORGANIZED HEALTH CARE EDUCATION/TRAINING PROGRAM

## 2025-07-28 PROCEDURE — 6370000000 HC RX 637 (ALT 250 FOR IP): Performed by: NURSE PRACTITIONER

## 2025-07-28 PROCEDURE — 2500000003 HC RX 250 WO HCPCS: Performed by: INTERNAL MEDICINE

## 2025-07-28 PROCEDURE — 92507 TX SP LANG VOICE COMM INDIV: CPT

## 2025-07-28 PROCEDURE — 6370000000 HC RX 637 (ALT 250 FOR IP): Performed by: INTERNAL MEDICINE

## 2025-07-28 PROCEDURE — 97116 GAIT TRAINING THERAPY: CPT

## 2025-07-28 PROCEDURE — 99232 SBSQ HOSP IP/OBS MODERATE 35: CPT | Performed by: INTERNAL MEDICINE

## 2025-07-28 RX ORDER — LEVETIRACETAM 750 MG/1
750 TABLET ORAL 2 TIMES DAILY
Qty: 60 TABLET | Refills: 3 | Status: SHIPPED | OUTPATIENT
Start: 2025-07-28

## 2025-07-28 RX ADMIN — Medication 10 ML: at 09:24

## 2025-07-28 RX ADMIN — ANTACID TABLETS 500 MG: 500 TABLET, CHEWABLE ORAL at 01:48

## 2025-07-28 RX ADMIN — Medication 1 CAPSULE: at 09:23

## 2025-07-28 RX ADMIN — LEVOTHYROXINE SODIUM 137 MCG: 0.11 TABLET ORAL at 05:29

## 2025-07-28 RX ADMIN — LEVETIRACETAM 750 MG: 500 TABLET, FILM COATED ORAL at 09:23

## 2025-07-28 RX ADMIN — APIXABAN 5 MG: 5 TABLET, FILM COATED ORAL at 09:23

## 2025-07-28 RX ADMIN — METOPROLOL TARTRATE 75 MG: 50 TABLET, FILM COATED ORAL at 09:23

## 2025-07-28 RX ADMIN — BUDESONIDE AND FORMOTEROL FUMARATE DIHYDRATE 2 PUFF: 160; 4.5 AEROSOL RESPIRATORY (INHALATION) at 07:35

## 2025-07-28 RX ADMIN — VENLAFAXINE HYDROCHLORIDE 150 MG: 150 CAPSULE, EXTENDED RELEASE ORAL at 09:23

## 2025-07-28 RX ADMIN — PANTOPRAZOLE SODIUM 40 MG: 40 TABLET, DELAYED RELEASE ORAL at 05:29

## 2025-07-28 RX ADMIN — SODIUM CHLORIDE, PRESERVATIVE FREE 10 ML: 5 INJECTION INTRAVENOUS at 09:24

## 2025-07-28 ASSESSMENT — ENCOUNTER SYMPTOMS
BACK PAIN: 0
WHEEZING: 0
TROUBLE SWALLOWING: 0
VOMITING: 0
COLOR CHANGE: 0
NAUSEA: 0
CHOKING: 0
COUGH: 0
SHORTNESS OF BREATH: 0
PHOTOPHOBIA: 0

## 2025-07-28 NOTE — DISCHARGE SUMMARY
Hospital Medicine Discharge Summary    Lin Ferrell  :  1955  MRN:  [id number]    Admit date:  2025  Discharge date:  2025    Admitting Physician:  Jack Rock MD  Primary Care Physician:  Kobe Duke MD      Discharge Diagnoses:    Principal Problem:    Stroke determined by clinical assessment ([facility])  Active Problems:    Chronic obstructive pulmonary disease (HCC)    Encephalopathy acute    Cerebrovascular accident (CVA) due to embolism of right middle cerebral artery (HCC)    New onset a-fib ([facility])  Resolved Problems:    * No resolved hospital problems. *      Hospital Course:   Lin Ferrell is a 70 y.o. female that was admitted and treated at [facility] [place] for the following medical issues:     Acute encephalopathy  - likely due to breakthrough seizure  - CT head, MRI brain, LP were negative   - started on Keppra  - EEG was concerning for abnormal spikes  - clinically improved  - followed by neurology     RLL pneumonia   - completed course of ceftriaxone and doxcycline     Newly diagnosed atrial fibrillation  - started on Lopressor and Eliquis  - followed by cardiology      Disposition - home    Patient was seen by the following consultants while admitted to [facility] [place]:   Consults:  IP CONSULT TO NEUROLOGY  IP CONSULT TO PULMONOLOGY  IP CONSULT TO INTERVENTIONAL RADIOLOGY  IP CONSULT TO CARDIOLOGY    Significant Diagnostic Studies:    Vascular duplex carotid bilateral  Result Date: 2025  EXAMINATION: ULTRASOUND EVALUATION OF THE CAROTID ARTERIES 2025 TECHNIQUE: Duplex ultrasound using B-mode/gray scaled imaging, Doppler spectral analysis and color flow Doppler was obtained of the carotid arteries. COMPARISON: None. HISTORY: ORDERING SYSTEM PROVIDED HISTORY: CVA TECHNOLOGIST PROVIDED HISTORY: What reading provider will be dictating this exam?->CRC FINDINGS: There is mild plaque in the visualized segments of the right common carotid artery, internal

## 2025-07-28 NOTE — PLAN OF CARE
Problem: Chronic Conditions and Co-morbidities  Goal: Patient's chronic conditions and co-morbidity symptoms are monitored and maintained or improved  7/21/2025 2335 by Mar Vines RN  Outcome: Progressing  7/21/2025 1712 by Stacie Correa RN  Outcome: Progressing     Problem: Discharge Planning  Goal: Discharge to home or other facility with appropriate resources  7/21/2025 2335 by Mar Vines RN  Outcome: Progressing  7/21/2025 1712 by Stacie Correa RN  Outcome: Progressing     Problem: Skin/Tissue Integrity  Goal: Skin integrity remains intact  Description: 1.  Monitor for areas of redness and/or skin breakdown  2.  Assess vascular access sites hourly  3.  Every 4-6 hours minimum:  Change oxygen saturation probe site  4.  Every 4-6 hours:  If on nasal continuous positive airway pressure, respiratory therapy assess nares and determine need for appliance change or resting period  7/21/2025 2335 by Mar Vines RN  Outcome: Progressing  7/21/2025 1712 by Stacie Correa RN  Outcome: Progressing     Problem: ABCDS Injury Assessment  Goal: Absence of physical injury  7/21/2025 2335 by Mar Vines RN  Outcome: Progressing  7/21/2025 1712 by Stacie Correa RN  Outcome: Progressing     Problem: Safety - Adult  Goal: Free from fall injury  7/21/2025 2335 by Mar Vines RN  Outcome: Progressing  7/21/2025 1712 by Stacie Correa RN  Outcome: Progressing     Problem: Pain  Goal: Verbalizes/displays adequate comfort level or baseline comfort level  7/21/2025 2335 by Mar Vines RN  Outcome: Progressing  7/21/2025 1712 by Stacie Correa RN  Outcome: Progressing     Problem: SLP Adult - Impaired Swallowing  Goal: By Discharge: Advance to least restrictive diet without signs or symptoms of aspiration for planned discharge setting.  See evaluation for individualized goals.  7/21/2025 1100 by Gris Plascencia, SLP  Outcome: Progressing     Problem: SLP Adult - Impaired Communication  Goal: By 
  Problem: Chronic Conditions and Co-morbidities  Goal: Patient's chronic conditions and co-morbidity symptoms are monitored and maintained or improved  7/22/2025 2320 by Mar Vines RN  Outcome: Progressing  7/22/2025 1517 by Stacie Correa RN  Outcome: Progressing     Problem: Discharge Planning  Goal: Discharge to home or other facility with appropriate resources  7/22/2025 2320 by Mar Vines RN  Outcome: Progressing  7/22/2025 1517 by Stacie Correa RN  Outcome: Progressing     Problem: Skin/Tissue Integrity  Goal: Skin integrity remains intact  Description: 1.  Monitor for areas of redness and/or skin breakdown  2.  Assess vascular access sites hourly  3.  Every 4-6 hours minimum:  Change oxygen saturation probe site  4.  Every 4-6 hours:  If on nasal continuous positive airway pressure, respiratory therapy assess nares and determine need for appliance change or resting period  7/22/2025 2320 by Mar Vines RN  Outcome: Progressing  7/22/2025 1517 by Stacie Correa RN  Outcome: Progressing     Problem: ABCDS Injury Assessment  Goal: Absence of physical injury  7/22/2025 2320 by Mar Vines RN  Outcome: Progressing  7/22/2025 1517 by Stacie Correa RN  Outcome: Progressing     Problem: Safety - Adult  Goal: Free from fall injury  7/22/2025 2320 by Mar Vines RN  Outcome: Progressing  7/22/2025 1517 by Stacie Correa RN  Outcome: Progressing     Problem: Pain  Goal: Verbalizes/displays adequate comfort level or baseline comfort level  7/22/2025 2320 by Mar Vines RN  Outcome: Progressing  7/22/2025 1517 by Stacie Correa RN  Outcome: Progressing     Problem: Nutrition Deficit:  Goal: Optimize nutritional status  7/22/2025 2320 by Mar Vines RN  Outcome: Progressing  7/22/2025 1517 by Stacie Correa RN  Outcome: Progressing     Problem: Neurosensory - Adult  Goal: Achieves stable or improved neurological status  7/22/2025 2320 by Mar Vines RN  Outcome: 
  Problem: Chronic Conditions and Co-morbidities  Goal: Patient's chronic conditions and co-morbidity symptoms are monitored and maintained or improved  7/23/2025 1144 by Nisreen Bowman RN  Outcome: Progressing     Problem: Discharge Planning  Goal: Discharge to home or other facility with appropriate resources  7/23/2025 1144 by Nisreen Bowman RN  Outcome: Progressing     Problem: Skin/Tissue Integrity  Goal: Skin integrity remains intact  Description: 1.  Monitor for areas of redness and/or skin breakdown  2.  Assess vascular access sites hourly  3.  Every 4-6 hours minimum:  Change oxygen saturation probe site  4.  Every 4-6 hours:  If on nasal continuous positive airway pressure, respiratory therapy assess nares and determine need for appliance change or resting period  7/23/2025 1144 by Nisreen Bowman RN  Outcome: Progressing     Problem: ABCDS Injury Assessment  Goal: Absence of physical injury  7/23/2025 1144 by Nisreen Bowman RN  Outcome: Progressing     Problem: Safety - Adult  Goal: Free from fall injury  7/23/2025 1144 by Nisreen Bowman RN  Outcome: Progressing     Problem: Pain  Goal: Verbalizes/displays adequate comfort level or baseline comfort level  7/23/2025 1144 by Nisreen Bowman RN  Outcome: Progressing     Problem: Nutrition Deficit:  Goal: Optimize nutritional status  7/23/2025 1144 by Nisreen Bowman RN  Outcome: Progressing     Problem: Neurosensory - Adult  Goal: Achieves stable or improved neurological status  7/23/2025 1144 by Nisreen Bowman RN  Outcome: Progressing     Problem: Neurosensory - Adult  Goal: Achieves maximal functionality and self care  7/23/2025 1144 by Nisreen Bowman RN  Outcome: Progressing     Problem: Respiratory - Adult  Goal: Achieves optimal ventilation and oxygenation  7/23/2025 1144 by Nisreen Bowman RN  Outcome: Progressing     Problem: Cardiovascular - Adult  Goal: Maintains optimal cardiac output and hemodynamic 
  Problem: Chronic Conditions and Co-morbidities  Goal: Patient's chronic conditions and co-morbidity symptoms are monitored and maintained or improved  7/23/2025 2327 by Mar Vines RN  Outcome: Progressing  7/23/2025 1144 by Nisreen Bowman RN  Outcome: Progressing     Problem: Discharge Planning  Goal: Discharge to home or other facility with appropriate resources  7/23/2025 2327 by Mar Vines RN  Outcome: Progressing  7/23/2025 1144 by Nisreen Bowman RN  Outcome: Progressing     Problem: Skin/Tissue Integrity  Goal: Skin integrity remains intact  Description: 1.  Monitor for areas of redness and/or skin breakdown  2.  Assess vascular access sites hourly  3.  Every 4-6 hours minimum:  Change oxygen saturation probe site  4.  Every 4-6 hours:  If on nasal continuous positive airway pressure, respiratory therapy assess nares and determine need for appliance change or resting period  7/23/2025 2327 by Mar Vines RN  Outcome: Progressing  7/23/2025 1144 by Nisreen Bowman RN  Outcome: Progressing     Problem: ABCDS Injury Assessment  Goal: Absence of physical injury  7/23/2025 2327 by Mar Vines RN  Outcome: Progressing  7/23/2025 1144 by Nisreen Bowman RN  Outcome: Progressing     Problem: Safety - Adult  Goal: Free from fall injury  7/23/2025 2327 by Mar Vines RN  Outcome: Progressing  7/23/2025 1144 by Nisreen Bowman RN  Outcome: Progressing     Problem: Pain  Goal: Verbalizes/displays adequate comfort level or baseline comfort level  7/23/2025 2327 by Mar Vines RN  Outcome: Progressing  7/23/2025 1144 by Nisreen Bowman RN  Outcome: Progressing     Problem: Nutrition Deficit:  Goal: Optimize nutritional status  7/23/2025 2327 by Mar Vines RN  Outcome: Progressing  7/23/2025 1144 by Nisreen Bowman RN  Outcome: Progressing     Problem: Neurosensory - Adult  Goal: Achieves stable or improved neurological status  7/23/2025 2327 by Mar Vines 
  Problem: Chronic Conditions and Co-morbidities  Goal: Patient's chronic conditions and co-morbidity symptoms are monitored and maintained or improved  7/24/2025 1151 by Georgia Casas RN  Outcome: Progressing  7/23/2025 2327 by Mar Vines RN  Outcome: Progressing     Problem: Discharge Planning  Goal: Discharge to home or other facility with appropriate resources  7/24/2025 1151 by Georgia Casas RN  Outcome: Progressing  7/23/2025 2327 by Mar iVnes RN  Outcome: Progressing     Problem: Skin/Tissue Integrity  Goal: Skin integrity remains intact  Description: 1.  Monitor for areas of redness and/or skin breakdown  2.  Assess vascular access sites hourly  3.  Every 4-6 hours minimum:  Change oxygen saturation probe site  4.  Every 4-6 hours:  If on nasal continuous positive airway pressure, respiratory therapy assess nares and determine need for appliance change or resting period  7/24/2025 1151 by Georgia Casas RN  Outcome: Progressing  7/23/2025 2327 by Mar Vines RN  Outcome: Progressing     Problem: ABCDS Injury Assessment  Goal: Absence of physical injury  7/24/2025 1151 by Georgia Casas RN  Outcome: Progressing  7/23/2025 2327 by Mar Vines RN  Outcome: Progressing     Problem: Safety - Adult  Goal: Free from fall injury  7/24/2025 1151 by Georgia Casas RN  Outcome: Progressing  7/23/2025 2327 by Mar Vines RN  Outcome: Progressing     Problem: Pain  Goal: Verbalizes/displays adequate comfort level or baseline comfort level  7/24/2025 1151 by Georgia Casas RN  Outcome: Progressing  7/23/2025 2327 by Mar Vines RN  Outcome: Progressing     Problem: Nutrition Deficit:  Goal: Optimize nutritional status  7/24/2025 1151 by Georgia Casas RN  Outcome: Progressing  7/23/2025 2327 by Mar Vines RN  Outcome: Progressing     Problem: Neurosensory - Adult  Goal: Achieves stable or improved neurological status  7/24/2025 1151 by Georgia Casas RN  Outcome: 
  Problem: Chronic Conditions and Co-morbidities  Goal: Patient's chronic conditions and co-morbidity symptoms are monitored and maintained or improved  7/25/2025 0026 by Jonathan Luke RN  Outcome: Progressing  7/24/2025 1151 by Georgia Casas RN  Outcome: Progressing     Problem: Discharge Planning  Goal: Discharge to home or other facility with appropriate resources  7/25/2025 0026 by Jonathan Luke RN  Outcome: Progressing  7/24/2025 1151 by Georgia Casas RN  Outcome: Progressing     Problem: Skin/Tissue Integrity  Goal: Skin integrity remains intact  Description: 1.  Monitor for areas of redness and/or skin breakdown  2.  Assess vascular access sites hourly  3.  Every 4-6 hours minimum:  Change oxygen saturation probe site  4.  Every 4-6 hours:  If on nasal continuous positive airway pressure, respiratory therapy assess nares and determine need for appliance change or resting period  7/25/2025 0026 by Jonathan Luke RN  Outcome: Progressing  7/24/2025 1151 by Georgia Casas RN  Outcome: Progressing     Problem: ABCDS Injury Assessment  Goal: Absence of physical injury  7/25/2025 0026 by Jonathan Luke RN  Outcome: Progressing  7/24/2025 1151 by Georgia Casas RN  Outcome: Progressing     Problem: Safety - Adult  Goal: Free from fall injury  7/25/2025 0026 by Jonathan Luke RN  Outcome: Progressing  7/24/2025 1151 by Georgia Casas RN  Outcome: Progressing     Problem: Pain  Goal: Verbalizes/displays adequate comfort level or baseline comfort level  7/25/2025 0026 by Jonathan Luke RN  Outcome: Progressing  7/24/2025 1151 by Georgia Casas RN  Outcome: Progressing     Problem: Nutrition Deficit:  Goal: Optimize nutritional status  7/25/2025 0026 by Jonathan Luke RN  Outcome: Progressing  Flowsheets (Taken 7/24/2025 1413 by Nadia Leigh, RD, LD)  Nutrient intake appropriate for improving, restoring, or maintaining nutritional needs:   Assess nutritional status and recommend course of action   Monitor 
  Problem: Chronic Conditions and Co-morbidities  Goal: Patient's chronic conditions and co-morbidity symptoms are monitored and maintained or improved  7/26/2025 1633 by Courtney Lincoln RN  Outcome: Progressing     Problem: Discharge Planning  Goal: Discharge to home or other facility with appropriate resources  7/26/2025 1633 by Courtney Lincoln RN  Outcome: Progressing     Problem: Skin/Tissue Integrity  Goal: Skin integrity remains intact  Description: 1.  Monitor for areas of redness and/or skin breakdown  2.  Assess vascular access sites hourly  3.  Every 4-6 hours minimum:  Change oxygen saturation probe site  4.  Every 4-6 hours:  If on nasal continuous positive airway pressure, respiratory therapy assess nares and determine need for appliance change or resting period  7/26/2025 1633 by Courtney Lincoln RN  Outcome: Progressing     Problem: ABCDS Injury Assessment  Goal: Absence of physical injury  7/26/2025 1633 by Courtney Lincoln RN  Outcome: Progressing     Problem: Skin/Tissue Integrity - Adult  Goal: Skin integrity remains intact  Description: 1.  Monitor for areas of redness and/or skin breakdown  2.  Assess vascular access sites hourly  3.  Every 4-6 hours minimum:  Change oxygen saturation probe site  4.  Every 4-6 hours:  If on nasal continuous positive airway pressure, respiratory therapy assess nares and determine need for appliance change or resting period  7/26/2025 1633 by Courtney Lincoln RN  Outcome: Progressing     
  Problem: Chronic Conditions and Co-morbidities  Goal: Patient's chronic conditions and co-morbidity symptoms are monitored and maintained or improved  7/26/2025 1633 by Courtney Lincoln RN  Outcome: Progressing     Problem: Discharge Planning  Goal: Discharge to home or other facility with appropriate resources  7/26/2025 1633 by Courtney Lincoln RN  Outcome: Progressing     Problem: Skin/Tissue Integrity  Goal: Skin integrity remains intact  Description: 1.  Monitor for areas of redness and/or skin breakdown  2.  Assess vascular access sites hourly  3.  Every 4-6 hours minimum:  Change oxygen saturation probe site  4.  Every 4-6 hours:  If on nasal continuous positive airway pressure, respiratory therapy assess nares and determine need for appliance change or resting period  7/26/2025 1633 by Courtney Lincoln RN  Outcome: Progressing     Problem: ABCDS Injury Assessment  Goal: Absence of physical injury  7/26/2025 1633 by Courtney Lincoln RN  Outcome: Progressing     Problem: Skin/Tissue Integrity - Adult  Goal: Skin integrity remains intact  Description: 1.  Monitor for areas of redness and/or skin breakdown  2.  Assess vascular access sites hourly  3.  Every 4-6 hours minimum:  Change oxygen saturation probe site  4.  Every 4-6 hours:  If on nasal continuous positive airway pressure, respiratory therapy assess nares and determine need for appliance change or resting period  7/26/2025 1633 by Courtney Lincoln RN  Outcome: Progressing     
  Problem: Chronic Conditions and Co-morbidities  Goal: Patient's chronic conditions and co-morbidity symptoms are monitored and maintained or improved  7/28/2025 1143 by Leny Leon RN  Outcome: Progressing  7/28/2025 0104 by Jonathan Luke RN  Outcome: Progressing     Problem: Discharge Planning  Goal: Discharge to home or other facility with appropriate resources  7/28/2025 1143 by Leny Leon RN  Outcome: Progressing  7/28/2025 0104 by Jonathan Luke RN  Outcome: Progressing     Problem: Skin/Tissue Integrity  Goal: Skin integrity remains intact  Description: 1.  Monitor for areas of redness and/or skin breakdown  2.  Assess vascular access sites hourly  3.  Every 4-6 hours minimum:  Change oxygen saturation probe site  4.  Every 4-6 hours:  If on nasal continuous positive airway pressure, respiratory therapy assess nares and determine need for appliance change or resting period  7/28/2025 1143 by Leny Leon RN  Outcome: Progressing  7/28/2025 0104 by Jonathan Luke RN  Outcome: Progressing     Problem: ABCDS Injury Assessment  Goal: Absence of physical injury  7/28/2025 1143 by Leny Leon RN  Outcome: Progressing  7/28/2025 0104 by Jonathan Luke RN  Outcome: Progressing     Problem: Safety - Adult  Goal: Free from fall injury  7/28/2025 1143 by Leny Leon RN  Outcome: Progressing  7/28/2025 0104 by Jonathan Luke RN  Outcome: Progressing     Problem: Pain  Goal: Verbalizes/displays adequate comfort level or baseline comfort level  7/28/2025 1143 by Leny Leon RN  Outcome: Progressing  7/28/2025 0104 by Jonathan Luke RN  Outcome: Progressing     Problem: Nutrition Deficit:  Goal: Optimize nutritional status  7/28/2025 1143 by Leny Leon RN  Outcome: Progressing  7/28/2025 0104 by Jonathan Luke RN  Outcome: Progressing     Problem: Neurosensory - Adult  Goal: Achieves stable or improved neurological status  7/28/2025 1143 by Leny Leon RN  Outcome: Progressing  7/28/2025 0104 by 
  Problem: Chronic Conditions and Co-morbidities  Goal: Patient's chronic conditions and co-morbidity symptoms are monitored and maintained or improved  Outcome: Progressing     Problem: Discharge Planning  Goal: Discharge to home or other facility with appropriate resources  Outcome: Progressing     Problem: Skin/Tissue Integrity  Goal: Skin integrity remains intact  Description: 1.  Monitor for areas of redness and/or skin breakdown  2.  Assess vascular access sites hourly  3.  Every 4-6 hours minimum:  Change oxygen saturation probe site  4.  Every 4-6 hours:  If on nasal continuous positive airway pressure, respiratory therapy assess nares and determine need for appliance change or resting period  Outcome: Progressing     Problem: ABCDS Injury Assessment  Goal: Absence of physical injury  Outcome: Progressing     Problem: Safety - Adult  Goal: Free from fall injury  Outcome: Progressing     
  Problem: Chronic Conditions and Co-morbidities  Goal: Patient's chronic conditions and co-morbidity symptoms are monitored and maintained or improved  Outcome: Progressing     Problem: Discharge Planning  Goal: Discharge to home or other facility with appropriate resources  Outcome: Progressing     Problem: Skin/Tissue Integrity  Goal: Skin integrity remains intact  Description: 1.  Monitor for areas of redness and/or skin breakdown  2.  Assess vascular access sites hourly  3.  Every 4-6 hours minimum:  Change oxygen saturation probe site  4.  Every 4-6 hours:  If on nasal continuous positive airway pressure, respiratory therapy assess nares and determine need for appliance change or resting period  Outcome: Progressing     Problem: ABCDS Injury Assessment  Goal: Absence of physical injury  Outcome: Progressing     Problem: Safety - Adult  Goal: Free from fall injury  Outcome: Progressing     Problem: Pain  Goal: Verbalizes/displays adequate comfort level or baseline comfort level  Outcome: Progressing     Problem: Nutrition Deficit:  Goal: Optimize nutritional status  Outcome: Progressing     Problem: Neurosensory - Adult  Goal: Achieves stable or improved neurological status  Outcome: Progressing  Goal: Achieves maximal functionality and self care  Outcome: Progressing     Problem: Respiratory - Adult  Goal: Achieves optimal ventilation and oxygenation  Outcome: Progressing     Problem: Cardiovascular - Adult  Goal: Maintains optimal cardiac output and hemodynamic stability  Outcome: Progressing  Goal: Absence of cardiac dysrhythmias or at baseline  Outcome: Progressing     Problem: Skin/Tissue Integrity - Adult  Goal: Skin integrity remains intact  Description: 1.  Monitor for areas of redness and/or skin breakdown  2.  Assess vascular access sites hourly  3.  Every 4-6 hours minimum:  Change oxygen saturation probe site  4.  Every 4-6 hours:  If on nasal continuous positive airway pressure, respiratory therapy 
  Problem: Chronic Conditions and Co-morbidities  Goal: Patient's chronic conditions and co-morbidity symptoms are monitored and maintained or improved  Outcome: Progressing     Problem: Discharge Planning  Goal: Discharge to home or other facility with appropriate resources  Outcome: Progressing     Problem: Skin/Tissue Integrity  Goal: Skin integrity remains intact  Description: 1.  Monitor for areas of redness and/or skin breakdown  2.  Assess vascular access sites hourly  3.  Every 4-6 hours minimum:  Change oxygen saturation probe site  4.  Every 4-6 hours:  If on nasal continuous positive airway pressure, respiratory therapy assess nares and determine need for appliance change or resting period  Outcome: Progressing     Problem: ABCDS Injury Assessment  Goal: Absence of physical injury  Outcome: Progressing     Problem: Safety - Adult  Goal: Free from fall injury  Outcome: Progressing     Problem: Pain  Goal: Verbalizes/displays adequate comfort level or baseline comfort level  Outcome: Progressing     Problem: Nutrition Deficit:  Goal: Optimize nutritional status  Outcome: Progressing     Problem: Neurosensory - Adult  Goal: Achieves stable or improved neurological status  Outcome: Progressing  Goal: Achieves maximal functionality and self care  Outcome: Progressing     Problem: Respiratory - Adult  Goal: Achieves optimal ventilation and oxygenation  Outcome: Progressing     Problem: Cardiovascular - Adult  Goal: Maintains optimal cardiac output and hemodynamic stability  Outcome: Progressing  Goal: Absence of cardiac dysrhythmias or at baseline  Outcome: Progressing     Problem: Skin/Tissue Integrity - Adult  Goal: Skin integrity remains intact  Description: 1.  Monitor for areas of redness and/or skin breakdown  2.  Assess vascular access sites hourly  3.  Every 4-6 hours minimum:  Change oxygen saturation probe site  4.  Every 4-6 hours:  If on nasal continuous positive airway pressure, respiratory therapy 
  Problem: Chronic Conditions and Co-morbidities  Goal: Patient's chronic conditions and co-morbidity symptoms are monitored and maintained or improved  Outcome: Progressing     Problem: Discharge Planning  Goal: Discharge to home or other facility with appropriate resources  Outcome: Progressing     Problem: Skin/Tissue Integrity  Goal: Skin integrity remains intact  Description: 1.  Monitor for areas of redness and/or skin breakdown  2.  Assess vascular access sites hourly  3.  Every 4-6 hours minimum:  Change oxygen saturation probe site  4.  Every 4-6 hours:  If on nasal continuous positive airway pressure, respiratory therapy assess nares and determine need for appliance change or resting period  Outcome: Progressing     Problem: ABCDS Injury Assessment  Goal: Absence of physical injury  Outcome: Progressing     Problem: Safety - Adult  Goal: Free from fall injury  Outcome: Progressing     Problem: Pain  Goal: Verbalizes/displays adequate comfort level or baseline comfort level  Outcome: Progressing     Problem: SLP Adult - Impaired Swallowing  Goal: By Discharge: Advance to least restrictive diet without signs or symptoms of aspiration for planned discharge setting.  See evaluation for individualized goals.  7/21/2025 1100 by Gris Plascencia, SLP  Outcome: Progressing     Problem: SLP Adult - Impaired Communication  Goal: By Discharge: Demonstrates communication skills at highest level of function for planned discharge setting.  See evaluation for individualized goals.  7/21/2025 1100 by Gris Plascencia, SLP  Outcome: Progressing     Problem: Physical Therapy - Adult  Goal: By Discharge: Performs mobility at highest level of function for planned discharge setting.  See evaluation for individualized goals.  7/21/2025 1239 by Tamara Garcia, PT  Outcome: Progressing     Problem: Nutrition Deficit:  Goal: Optimize nutritional status  7/21/2025 1712 by Stacie Correa, RN  Outcome: Progressing  7/21/2025 1517 by 
BSE/SLE completed.  
Nutrition Problem #1: Inadequate oral intake  Intervention: Food and/or Nutrient Delivery:  (Monitor for ability to resume PO diet (consistency as per SLP recommendations) vs EN)      
Therapy evaluation completed.  Please see daily notes and/or progress notes for details related to planned treatment interventions, goals and functional performance.    
retention  7/28/2025 0104 by Jonathan Luke RN  Outcome: Progressing  7/27/2025 1613 by Ayah Rodriguez RN  Outcome: Progressing     Problem: Anxiety  Goal: Will report anxiety at manageable levels  Description: INTERVENTIONS:  1. Administer medication as ordered  2. Teach and rehearse alternative coping skills  3. Provide emotional support with 1:1 interaction with staff  7/28/2025 0104 by Jonathan Luke RN  Outcome: Progressing  7/27/2025 1613 by Ayah Rodriguez RN  Outcome: Progressing     Problem: Coping  Goal: Pt/Family able to verbalize concerns and demonstrate effective coping strategies  Description: INTERVENTIONS:  1. Assist patient/family to identify coping skills, available support systems and cultural and spiritual values  2. Provide emotional support, including active listening and acknowledgement of concerns of patient and caregivers  3. Reduce environmental stimuli, as able  4. Instruct patient/family in relaxation techniques, as appropriate  5. Assess for spiritual pain/suffering and initiate Spiritual Care, Psychosocial Clinical Specialist consults as needed  7/28/2025 0104 by Jonathan Luke RN  Outcome: Progressing  7/27/2025 1613 by Ayah Rodriguez RN  Outcome: Progressing     Problem: Gastrointestinal - Adult  Goal: Minimal or absence of nausea and vomiting  7/28/2025 0104 by Jonathan Luke RN  Outcome: Progressing  7/27/2025 1613 by Ayah Rodriguez RN  Outcome: Progressing  Goal: Maintains or returns to baseline bowel function  7/28/2025 0104 by Jonathan Luke RN  Outcome: Progressing  7/27/2025 1613 by Ayah Rodriguez RN  Outcome: Progressing  Goal: Maintains adequate nutritional intake  7/28/2025 0104 by Jonathan Luke RN  Outcome: Progressing  7/27/2025 1613 by Ayah Rodriguez RN  Outcome: Progressing

## 2025-07-28 NOTE — DISCHARGE INSTR - DIET

## 2025-07-28 NOTE — CARE COORDINATION
Compa Hodge RN supervisor at [facility] pre cert still pending at this time.  
LSW CALLED PT'S SISTER COURTNEY TO DISCUSS THE THERAPY EVALUATIONS AND THE NEED FOR SNF.  COURTNEY CONFIRMED THAT SHE HAS THE SNF LIST BUT SHE IS NOT READY TO MAKE ANY DECISIONS AT THIS TIME.  LSW ENCOURAGED COURTNEY TO REVIEW THE LIST AND PICK A FEW PLACES THAT SHE LIKES SO WE CAN SECURE A BED FOR THE PT WHEN SHE IS READY FOR DC.    
Lsw met with the pt, her son and her sister at the bedside to discuss her DC needs.  Pt wants to go home but she is still weak and unsteady.  Son and sister agree that the pt should get a little more therapy before returning home.  Pt agreed to allow this LSW to send referrals to [facility] and [facility] rehab for review.  Pre cert will be needed.  LSW to follow for final DC plan.  
Met with pt and sister Nuris at bedside to discuss discharge planning. Per LSW note 7/24 pt and son would like [facility] or  [facility]. Confirmed they would like [facility].  Call to [facility] Supervisor [facility], they have accepted the pt and are starting precert today.     
PT HAS NEW ONSET AFIB, CARDS FOLLOWING.   
PT WAS A RR THIS AM.  PLAN NOW FOR LP.  
Plan remain to [facility]. Pre-cert started on 7/25/2025.   Pre-cert remain pending at this time.    Electronically signed by ALYSSA Schwab on 7/28/2025 at 9:02 AM    Pt [facility] Score of 20. Independent. Pt agreed with DC home with sister.   Updated facility.     Electronically signed by ALYSSA Schwab on 7/28/2025 at 11:30 AM    
Patient and/or Patient Representative Agree with the Discharge Plan?      Vianey Varghese  Case Management Department

## 2025-07-30 NOTE — PROGRESS NOTES
07/20/25 1700   RT Protocol   History Pulmonary Disease 1   Respiratory pattern 2   Breath sounds 4   Cough 0   Indications for Bronchodilator Therapy Wheezing associated with pulm disorder   Bronchodilator Assessment Score 7       
   07/21/25 0800   RT Protocol   History Pulmonary Disease 2   Respiratory pattern 0   Breath sounds 4   Cough 0   Indications for Bronchodilator Therapy Wheezing associated with pulm disorder   Bronchodilator Assessment Score 6       
   07/22/25 0100   RT Protocol   History Pulmonary Disease 2   Respiratory pattern 0   Breath sounds 4   Cough 0   Indications for Bronchodilator Therapy Wheezing associated with pulm disorder   Bronchodilator Assessment Score 6       
   07/22/25 1022   RT Protocol   History Pulmonary Disease 2   Respiratory pattern 0   Breath sounds 2   Cough 0   Indications for Bronchodilator Therapy On home bronchodilators   Bronchodilator Assessment Score 4       
   07/22/25 2000   RT Protocol   History Pulmonary Disease 2   Respiratory pattern 0   Breath sounds 2   Cough 0   Indications for Bronchodilator Therapy Decreased or absent breath sounds   Bronchodilator Assessment Score 4       
   07/23/25 0719   RT Protocol   History Pulmonary Disease 2   Respiratory pattern 0   Breath sounds 2   Cough 0   Indications for Bronchodilator Therapy Decreased or absent breath sounds   Bronchodilator Assessment Score 4       
   07/23/25 1000   RT Protocol   History Pulmonary Disease 2   Respiratory pattern 0   Breath sounds 2   Cough 0   Indications for Bronchodilator Therapy Decreased or absent breath sounds;On home bronchodilators   Bronchodilator Assessment Score 4       
   07/23/25 2000   RT Protocol   History Pulmonary Disease 2   Respiratory pattern 0   Breath sounds 0   Cough 0   Indications for Bronchodilator Therapy None   Bronchodilator Assessment Score 2       
  [facility] Neurology Daily Progress Note  Name: Lin Ferrell  Age: 70 y.o.  Gender: female  CodeStatus: DNR-CCA  Allergies: Carrot  Cashew Nut Oil  Celery Oil  Ciprofloxacin  Sulfa Antibiotics  Zithromax [Azithromycin]    Chief Complaint:No chief complaint on file.    Primary Care Provider: Kobe Duke MD  InpatientTreatment Team: Treatment Team:   Janet Venegas MD Patel, Dhruv R, MD Abboud, Rami, MD Wehbe, MD Nisreen Grace Sierra Perez, Jacquelyn A, Stacie Mukherjee RN Vance, Kelly, RN  Admission Date: 7/19/2025      HPI   Pt seen and examined for neuro follow up.  Patient more awake today.  Oriented x 1-2.  No dysarthria or aphasia noted.  Denies headache.  No seizure activity reported.  Moving all extremities equally and spontaneously.  Not fully cooperative with exam.    Patient seen and examined events as noted.  Vitals:    07/22/25 0752   BP:    Pulse:    Resp:    Temp:    SpO2: 98%        Review of Systems   Unable to perform ROS: Mental status change   Constitutional:  Negative for fever.   HENT:  Positive for trouble swallowing. Negative for hearing loss.    Respiratory:  Negative for cough and wheezing.    Cardiovascular:  Negative for palpitations and leg swelling.   Gastrointestinal:  Negative for vomiting.   Genitourinary:  Negative for difficulty urinating.   Skin:  Negative for color change and rash.   Neurological:  Negative for tremors, seizures, syncope, facial asymmetry, speech difficulty and weakness.   Psychiatric/Behavioral:  Positive for confusion. Negative for agitation and hallucinations. The patient is not nervous/anxious.          Physical Exam  Vitals and nursing note reviewed.   Constitutional:       General: She is not in acute distress.     Appearance: She is ill-appearing. She is not diaphoretic.   HENT:      Head: Normocephalic.   Eyes:      Pupils: Pupils are equal, round, and reactive to light.   Cardiovascular:      Rate and Rhythm: Normal rate and regular 
  [facility] Neurology Daily Progress Note  Name: Lin Ferrell  Age: 70 y.o.  Gender: female  CodeStatus: DNR-CCA  Allergies: Carrot  Cashew Nut Oil  Celery Oil  Ciprofloxacin  Sulfa Antibiotics  Zithromax [Azithromycin]    Chief Complaint:No chief complaint on file.    Primary Care Provider: Kobe Duke MD  InpatientTreatment Team: Treatment Team:   Jack Rock MD Patel, MD Blake Ibarra Rami, MD Vance, Kelly, Leny Olivarez RN Cason, Deanna L, Anthony Young, Ana Rosa Ding HerSamantha granda  Admission Date: 7/19/2025      HPI   Pt seen and examined for neuro follow up.  Patient is alert and oriented x 3, no acute distress, cooperative.  Encephalopathy resolved.  Nonfocal.  No seizures.  Afebrile.      Patient stable overnight and over the weekend.  His last seen around Friday.  Patient Multiple questions again especially her sister  Vitals:    07/28/25 0723   BP: 118/65   Pulse: 50   Resp: 16   Temp: 98.2 °F (36.8 °C)   SpO2: 95%        Review of Systems   Constitutional:  Negative for fever.   HENT:  Negative for ear pain, hearing loss, tinnitus and trouble swallowing.    Eyes:  Negative for photophobia and visual disturbance.   Respiratory:  Negative for cough, choking, shortness of breath and wheezing.    Cardiovascular:  Negative for chest pain, palpitations and leg swelling.   Gastrointestinal:  Negative for nausea and vomiting.   Genitourinary:  Negative for difficulty urinating.   Musculoskeletal:  Negative for back pain, gait problem, joint swelling, myalgias, neck pain and neck stiffness.   Skin:  Negative for color change and rash.   Allergic/Immunologic: Negative for food allergies.   Neurological:  Negative for dizziness, tremors, seizures, syncope, facial asymmetry, speech difficulty, weakness, light-headedness, numbness and headaches.   Psychiatric/Behavioral:  Negative for agitation, behavioral problems, confusion, hallucinations and sleep disturbance. The patient is 
  [facility] Neurology Daily Progress Note  Name: Lin Ferrell  Age: 70 y.o.  Gender: female  CodeStatus: DNR-CCA  Allergies: Carrot  Cashew Nut Oil  Celery Oil  Ciprofloxacin  Sulfa Antibiotics  Zithromax [Azithromycin]    Chief Complaint:No chief complaint on file.    Primary Care Provider: Kobe Duke MD  InpatientTreatment Team: Treatment Team:   Janet Venegas MD Patel, Dhruv R, MD Abboud, Rami, MD Marouf, Loai F, DO Taylor, Sierra Vance, Kelly, RN Downs, Angela R Kinney, Sheri L, RN  Admission Date: 7/19/2025      HPI   Pt seen and examined for neuro follow up.  Patient is alert and oriented x 3, no acute distress, cooperative.  Encephalopathy significantly improved.  Nonfocal.  No seizures.  Afebrile.  Now on regular diet.  Vitals:    07/24/25 0900   BP: (!) 109/57   Pulse: 83   Resp: 18   Temp: 97.8 °F (36.6 °C)   SpO2: 97%        Review of Systems   Unable to perform ROS: Mental status change   Constitutional:  Negative for fever.   HENT:  Negative for ear pain, hearing loss, tinnitus and trouble swallowing.    Eyes:  Negative for photophobia and visual disturbance.   Respiratory:  Negative for cough, choking, shortness of breath and wheezing.    Cardiovascular:  Negative for chest pain, palpitations and leg swelling.   Gastrointestinal:  Negative for nausea and vomiting.   Genitourinary:  Negative for difficulty urinating.   Musculoskeletal:  Negative for back pain, gait problem, joint swelling, myalgias, neck pain and neck stiffness.   Skin:  Negative for color change and rash.   Allergic/Immunologic: Negative for food allergies.   Neurological:  Negative for dizziness, tremors, seizures, syncope, facial asymmetry, speech difficulty, weakness, light-headedness, numbness and headaches.   Psychiatric/Behavioral:  Negative for agitation, behavioral problems, confusion, hallucinations and sleep disturbance. The patient is not nervous/anxious.          Physical Exam  Vitals and nursing note reviewed. 
  [facility] Neurology Daily Progress Note  Name: Lin Ferrell  Age: 70 y.o.  Gender: female  CodeStatus: DNR-CCA  Allergies: Carrot  Cashew Nut Oil  Celery Oil  Ciprofloxacin  Sulfa Antibiotics  Zithromax [Azithromycin]    Chief Complaint:No chief complaint on file.    Primary Care Provider: Kobe Duke MD  InpatientTreatment Team: Treatment Team:   Janet Venegas MD Patel, Dhruv R, MD Abboud, Rami, MD Vance, Kelly, RN Ensign, Claire, RN Dibernardo, Lisa  Admission Date: 7/19/2025      HPI   Pt seen and examined for neuro follow up.  Patient remains confused.  Mostly nonverbal.  Not cooperating or following commands.  No seizure activity reported.  Afebrile.  Patient is aphasic and very encephalopathic.    Vitals:    07/21/25 1236   BP: (!) 159/96   Pulse: 92   Resp:    Temp: 98.1 °F (36.7 °C)   SpO2: 93%        Review of Systems   Unable to perform ROS: Mental status change   Constitutional:  Negative for fever.   HENT:  Positive for trouble swallowing. Negative for hearing loss.    Respiratory:  Negative for cough and wheezing.    Cardiovascular:  Negative for palpitations and leg swelling.   Gastrointestinal:  Negative for vomiting.   Genitourinary:  Negative for difficulty urinating.   Musculoskeletal:  Positive for gait problem.   Skin:  Negative for color change and rash.   Neurological:  Positive for speech difficulty and weakness. Negative for tremors, seizures, syncope and facial asymmetry.   Psychiatric/Behavioral:  Positive for confusion. Negative for agitation and hallucinations. The patient is not nervous/anxious.          Physical Exam  Vitals and nursing note reviewed.   Constitutional:       General: She is not in acute distress.     Appearance: She is ill-appearing. She is not diaphoretic.   HENT:      Head: Normocephalic.   Eyes:      Pupils: Pupils are equal, round, and reactive to light.   Cardiovascular:      Rate and Rhythm: Normal rate and regular rhythm.   Pulmonary:      Effort: 
  [facility] Neurology Daily Progress Note  Name: Lin Ferrell  Age: 70 y.o.  Gender: female  CodeStatus: DNR-CCA  Allergies: Carrot  Cashew Nut Oil  Celery Oil  Ciprofloxacin  Sulfa Antibiotics  Zithromax [Azithromycin]    Chief Complaint:No chief complaint on file.    Primary Care Provider: Kobe Duke MD  InpatientTreatment Team: Treatment Team:   Janet Venegas MD Patel, Dhruv R, MD Abboud, Rami, MD Marouf, Loai F, DO Diaz, Kimberly, RN Vance, Kelly, RN Ancog, Trell Wood RN  Admission Date: 7/19/2025      HPI   Pt seen and examined for neuro follow up.  Patient is alert and oriented x 3, no acute distress, cooperative.  Encephalopathy resolved.  Nonfocal.  No seizures.  Afebrile.  Patient seen and examined events as noted above had no further confusion patient at baseline patient Multiple question why she did not want to do any      Vitals:    07/25/25 0745   BP: 121/63   Pulse: 52   Resp: 18   Temp: 98.2 °F (36.8 °C)   SpO2: 96%        Review of Systems   Unable to perform ROS: Mental status change   Constitutional:  Negative for fever.   HENT:  Negative for ear pain, hearing loss, tinnitus and trouble swallowing.    Eyes:  Negative for photophobia and visual disturbance.   Respiratory:  Negative for cough, choking, shortness of breath and wheezing.    Cardiovascular:  Negative for chest pain, palpitations and leg swelling.   Gastrointestinal:  Negative for nausea and vomiting.   Genitourinary:  Negative for difficulty urinating.   Musculoskeletal:  Negative for back pain, gait problem, joint swelling, myalgias, neck pain and neck stiffness.   Skin:  Negative for color change and rash.   Allergic/Immunologic: Negative for food allergies.   Neurological:  Negative for dizziness, tremors, seizures, syncope, facial asymmetry, speech difficulty, weakness, light-headedness, numbness and headaches.   Psychiatric/Behavioral:  Negative for agitation, behavioral problems, confusion, 
  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 Please provide   additional clinical indicators supportive of your documentation. Or please   document if the Acute CVA has been ruled out after study.    The clinical indicators include:  - MRI:  No sign of acute infarction. Moderate-sized old lacunar infarction   of the midportion of the left putamen, unchanged. A small old cortical   infarct is present in the left mid-posterior high parietal region, a small   distal mid-posterior left MCA superior division infarct. No acute   hemorrhage.No mass.No midline shift.No hydrocephalus. Radiology Reports.  - IM PN: Active Hospital Problems: Cerebrovascular accident (CVA) due to   embolism of right middle cerebral artery 2025 and Stroke determined by   clinical assessment 2025;  Acute metabolic encephalopathy - pt remains   confused, baseline is A&Ox4 - neurology following, appreciate their assistance   - most likely related to PNA as family states she becomes very confused in   the setting of active infection, cannot rule out seizure of CVA - CT head,   carotid duplex, and ABG unremarkable.  - Neurology PN: Ongoing encephalopathy in the setting of right lower lobe   pneumonia and hypoxia. Aphasia, resolved. Left-sided weakness, resolved.   Fever, resolved. MRI of the brain negative for acute findings.  There is old   lacunar infarct in the left putamen and old left MCA infarct. Continue aspirin   81 mg daily. Carotid duplex nondiagnostic. EEG completed with report pending.    
  [facility] Neurology Daily Progress Note  Name: Lin Ferrell  Age: 70 y.o.  Gender: female  CodeStatus: DNR-CCA  Allergies: Carrot  Cashew Nut Oil  Celery Oil  Ciprofloxacin  Sulfa Antibiotics  Zithromax [Azithromycin]    Chief Complaint:No chief complaint on file.    Primary Care Provider: Koeb Duke MD  InpatientTreatment Team: Treatment Team:   Janet Venegas MD Patel, Dhruv R, MD Abboud, Rami, MD Downs, Angela R Whitworth, Taylor, RN Hipp, Lisa, RN Marouf, Loai F, DO  Admission Date: 7/19/2025      HPI   Pt seen and examined for neuro follow up.  Patient more awake today.  Oriented x 2.  No dysarthria or aphasia noted.  Denies headache.  No seizure activity reported.  Moving all extremities equally and spontaneously.      Patient completely improved and at baseline.  Sister at bedside to confirm  Vitals:    07/23/25 1218   BP: 103/89   Pulse: 75   Resp:    Temp:    SpO2: 95%        Review of Systems   Constitutional:  Negative for fever.   HENT:  Negative for ear pain, hearing loss, tinnitus and trouble swallowing.    Eyes:  Negative for photophobia and visual disturbance.   Respiratory:  Negative for cough, choking, shortness of breath and wheezing.    Cardiovascular:  Negative for chest pain, palpitations and leg swelling.   Gastrointestinal:  Negative for nausea and vomiting.   Genitourinary:  Negative for difficulty urinating.   Musculoskeletal:  Negative for back pain, gait problem, joint swelling, myalgias, neck pain and neck stiffness.   Skin:  Negative for color change and rash.   Allergic/Immunologic: Negative for food allergies.   Neurological:  Negative for dizziness, tremors, seizures, syncope, facial asymmetry, speech difficulty, weakness, light-headedness, numbness and headaches.   Psychiatric/Behavioral:  Negative for agitation, behavioral problems, hallucinations and sleep disturbance. The patient is not nervous/anxious.          Physical Exam  Vitals and nursing note reviewed. 
CLINICAL PHARMACY NOTE: MEDS TO BEDS    Total # of Prescriptions Filled: 1   The following medications were delivered to the patient:  Levetiracetam 750 mg Tab    Additional Documentation:    
Call from monitor room stating pt's HR was in the 160s. Pt not symptomatic. BP elevated 170/73. Reviewing telemetry while in the room, pt's HR will spike in the 160s for a few moments but then go back down to 80s. PRN metoprolol given at this time.   
Comprehensive Nutrition Assessment    Type and Reason for Visit:  Initial, Positive nutrition screen    Nutrition Recommendations/Plan:   Monitor for ability to resume PO diet (consistency as per SLP recommendations) vs EN      Malnutrition Assessment:  Malnutrition Status:  At risk for malnutrition (07/21/25 1516)    Context:  Acute Illness     Findings of the 6 clinical characteristics of malnutrition:  Energy Intake:  Mild decrease in energy intake  Weight Loss:  Mild weight loss (5.7% in 2 months)     Body Fat Loss:  No body fat loss     Muscle Mass Loss:  No muscle mass loss    Fluid Accumulation:  Unable to assess     Strength:  Not Performed    Nutrition Assessment:    Pt triggered on malnutrition admission screening for poor po intake with weight loss pta.  Pt is currently encephalopic with aphasia and unable to provide nutrition hx.  Per siste who pt lives with, no significant changes in po intakes other than with recent admission to [facility] with diverticulitis.  BSE completed today with recommendations for NPO and ongoing SLP treatments with good prognosis noted.  Will monitor for ability to resume po vs need for EN    Nutrition Related Findings:    PMH: CVA, thyroid disease, recently diagnosed with diverticulitis and finishing a course of Flagyl and Cipro, COPD, transferred here from [place] for evaluation of possible CVA.  Currently encephalopathic, aphasic and does not follow commands. Per neurology \" hypoxia,  MRI of the brain pending \" BSE (7/21)=NPO with good prognosis noted.  Per sister who pt lives with, no significant changes in po intakes pta other than during admission to [place] for diverticulitis.  Labs: mild hyperglycemia, meds include prednisone.  IVF: 0.9% NaCl with KCl 20 mEq infusion Wound Type: None       Current Nutrition Intake & Therapies:          Diet NPO    Anthropometric Measures:  Height: 166.6 cm (5' 5.59\")  Ideal Body Weight (IBW): 128 lbs (58 kg)    Admission Body Weight: 
Comprehensive Nutrition Assessment    Type and Reason for Visit:  Reassess    Nutrition Recommendations/Plan:   Food and/or Nutrient Delivery: Continue Current Diet, Start Oral Nutrition Supplement     Malnutrition Assessment:  Malnutrition Status:  At risk for malnutrition (07/21/25 1516)    Context:  Acute Illness       Nutrition Assessment:    Pt presents with reported poor appetite and noted poor po intake at this time, after NPO x 4 days. Pta, no significant changes in po intake per pt's live-in sister, other than with recent admission to [facility] with diverticulitis. To provide high calorie supplement tid with meals to provide additional calories and protein, until appetite/adequate intake resumes. To continue to follow.     Nutrition Related Findings:    PMH: CVA, thyroid disease, recently diagnosed with diverticulitis, COPD; admitted with Acute metabolic Encephalopathy in the setting of right lower lobe pneumonia and hypoxia as per nuerology findings. BSE (7/21)=NPO; (7/23) upgraded to regular cosistency per SLP.  Per sister, who pt lives with, no significant changes in po intake pta other than during admission to [place] for diverticulitis. Labs: hyperglycemia and hypenatremia noted. Meds include-lipitor, synthroid, ppi, prednisone; IVF: 0.9% NaCl with KCl 20 mEq infusion. Wound Type: None       Current Nutrition Intake & Therapies:    Average Meal Intake: 1-25%     ADULT DIET; Regular  ADULT ORAL NUTRITION SUPPLEMENT; Breakfast, Lunch, Dinner; Standard High Calorie/High Protein Oral Supplement    Anthropometric Measures:  Height: 166.6 cm (5' 5.59\")  Ideal Body Weight (IBW): 128 lbs (58 kg)    Admission Body Weight: 86.6 kg (191 lb) (unknown wt source)  Current Body Weight: 83 kg (183 lb) (7/21),   Weight Source: Bed scale  Current BMI (kg/m2): 29.9  Usual Body Weight: 88 kg (194 lb) (3/10/25 office)     % Weight Change (Calculated): -5.7                    BMI Categories: Obese Class 1 (BMI 
Hospitalist Progress Note      PCP: Kobe Duke MD    Date of Admission: 7/19/2025    Chief Complaint:  Confusion    Subjective:  No acute events overnight. Pt awake and alert today. Sister at bedside reports she is at her mental status baseline. Denies chest pain or shortness of breath.    Medications:  Reviewed    Infusion Medications    sodium chloride      sodium chloride      0.9% NaCl with KCl 20 mEq 75 mL/hr at 07/23/25 2138     Scheduled Medications    enoxaparin  1 mg/kg SubCUTAneous BID    acyclovir  10 mg/kg (Adjusted) IntraVENous Q8H    levothyroxine  137 mcg Oral Daily    lisinopril  5 mg Oral Daily    metoprolol succinate  50 mg Oral Daily    pantoprazole  40 mg Oral QAM AC    venlafaxine  150 mg Oral Daily    budesonide-formoterol  2 puff Inhalation BID RT    doxycycline (VIBRAMYCIN) IV  100 mg IntraVENous Q12H    sodium chloride flush  5-40 mL IntraVENous 2 times per day    levETIRAcetam  750 mg IntraVENous Q12H    aspirin  300 mg Rectal Daily    atorvastatin  80 mg Oral Nightly    sodium chloride flush  5-40 mL IntraVENous 2 times per day    cefTRIAXone (ROCEPHIN) IV  1,000 mg IntraVENous Q24H     PRN Meds: metoprolol, calcium carbonate, labetalol, sodium chloride flush, sodium chloride, ipratropium 0.5 mg-albuterol 2.5 mg, sodium chloride, ondansetron **OR** ondansetron, polyethylene glycol, acetaminophen **OR** acetaminophen, LORazepam, hydrALAZINE      Intake/Output Summary (Last 24 hours) at 7/24/2025 1009  Last data filed at 7/24/2025 0658  Gross per 24 hour   Intake --   Output 800 ml   Net -800 ml     Exam:  /89  Pulse 75  Temp 97.7 °F (36.5 °C) (Oral)  Resp 16  Ht 1.666 m (5' 5.59\")  Wt 86.7 kg (191 lb 3.2 oz)  SpO2 95%  BMI 31.25 kg/m²   Physical Exam  Cardiovascular:      Rate and Rhythm: Normal rate and regular rhythm.   Pulmonary:      Effort: Pulmonary effort is normal. No respiratory distress.      Comments: NC in place.  Abdominal:      Palpations: Abdomen is soft.     
Hospitalist Progress Note      PCP: Kobe Duke MD    Date of Admission: 7/19/2025    Chief Complaint:  Confusion    Subjective:  No acute events overnight. Pt reports feeling well. Denies chest pain or shortness of breath.    Medications:  Reviewed    Infusion Medications    sodium chloride      sodium chloride       Scheduled Medications    metoprolol tartrate  75 mg Oral BID    apixaban  5 mg Oral BID    levETIRAcetam  750 mg Oral BID    lactobacillus  1 capsule Oral Daily with breakfast    levothyroxine  137 mcg Oral Daily    pantoprazole  40 mg Oral QAM AC    venlafaxine  150 mg Oral Daily    budesonide-formoterol  2 puff Inhalation BID RT    sodium chloride flush  5-40 mL IntraVENous 2 times per day    atorvastatin  80 mg Oral Nightly    sodium chloride flush  5-40 mL IntraVENous 2 times per day     PRN Meds: metoprolol, calcium carbonate, labetalol, sodium chloride flush, sodium chloride, ipratropium 0.5 mg-albuterol 2.5 mg, sodium chloride, ondansetron **OR** ondansetron, polyethylene glycol, acetaminophen **OR** acetaminophen, LORazepam, hydrALAZINE      Intake/Output Summary (Last 24 hours) at 7/26/2025 1506  Last data filed at 7/26/2025 0853  Gross per 24 hour   Intake 205 ml   Output --   Net 205 ml     Exam:  BP (!) 109/57  Pulse 83  Temp 97.8 °F (36.6 °C) (Oral)  Resp 18  Ht 1.666 m (5' 5.59\")  Wt 86.7 kg (191 lb 3.2 oz)  SpO2 97%  BMI 31.25 kg/m²   Physical Exam  Cardiovascular:      Rate and Rhythm: Normal rate and regular rhythm.   Pulmonary:      Effort: Pulmonary effort is normal. No respiratory distress.      Comments: NC in place.  Abdominal:      Palpations: Abdomen is soft.      Tenderness: There is no abdominal tenderness.   Neurological:      Mental Status: She is alert and oriented to person, place, and time. Mental status is at baseline.   Psychiatric:         Mood and Affect: Mood normal.         Behavior: Behavior normal.       Labs:   Recent Labs     07/24/25  0916 
Hospitalist Progress Note      PCP: Kobe Duke MD    Date of Admission: 7/19/2025    Chief Complaint:  Confusion    Subjective:  No acute events overnight. Pt reports feeling well. Denies chest pain or shortness of breath.    Medications:  Reviewed    Infusion Medications    sodium chloride      sodium chloride       Scheduled Medications    metoprolol tartrate  75 mg Oral BID    apixaban  5 mg Oral BID    levETIRAcetam  750 mg Oral BID    lactobacillus  1 capsule Oral Daily with breakfast    levothyroxine  137 mcg Oral Daily    pantoprazole  40 mg Oral QAM AC    venlafaxine  150 mg Oral Daily    budesonide-formoterol  2 puff Inhalation BID RT    sodium chloride flush  5-40 mL IntraVENous 2 times per day    atorvastatin  80 mg Oral Nightly    sodium chloride flush  5-40 mL IntraVENous 2 times per day     PRN Meds: metoprolol, calcium carbonate, labetalol, sodium chloride flush, sodium chloride, ipratropium 0.5 mg-albuterol 2.5 mg, sodium chloride, ondansetron **OR** ondansetron, polyethylene glycol, acetaminophen **OR** acetaminophen, LORazepam, hydrALAZINE      Intake/Output Summary (Last 24 hours) at 7/26/2025 1528  Last data filed at 7/26/2025 0853  Gross per 24 hour   Intake 205 ml   Output --   Net 205 ml     Exam:  /67  Pulse 55  Temp 98.4 °F (36.9 °C) (Oral)  Resp 16  Ht 1.666 m (5' 5.59\")  Wt 86.7 kg (191 lb 3.2 oz)  SpO2 94%  BMI 31.25 kg/m²   Physical Exam  Cardiovascular:      Rate and Rhythm: Normal rate and regular rhythm.   Pulmonary:      Effort: Pulmonary effort is normal. No respiratory distress.   Abdominal:      Palpations: Abdomen is soft.      Tenderness: There is no abdominal tenderness.   Neurological:      Mental Status: She is alert and oriented to person, place, and time. Mental status is at baseline.   Psychiatric:         Mood and Affect: Mood normal.         Behavior: Behavior normal.       Labs:   Recent Labs     07/24/25  0916 07/25/25  0819   WBC 24.6* 17.1*   HGB 
Hospitalist Progress Note      PCP: Kobe Duke MD    Date of Admission: 7/19/2025    Chief Complaint:  Confusion    Subjective:  No acute events overnight. Pt reports feeling well. Denies chest pain or shortness of breath.    Medications:  Reviewed    Infusion Medications    sodium chloride      sodium chloride       Scheduled Medications    metoprolol tartrate  75 mg Oral BID    apixaban  5 mg Oral BID    levETIRAcetam  750 mg Oral BID    lactobacillus  1 capsule Oral Daily with breakfast    levothyroxine  137 mcg Oral Daily    pantoprazole  40 mg Oral QAM AC    venlafaxine  150 mg Oral Daily    budesonide-formoterol  2 puff Inhalation BID RT    sodium chloride flush  5-40 mL IntraVENous 2 times per day    atorvastatin  80 mg Oral Nightly    sodium chloride flush  5-40 mL IntraVENous 2 times per day     PRN Meds: metoprolol, calcium carbonate, labetalol, sodium chloride flush, sodium chloride, ipratropium 0.5 mg-albuterol 2.5 mg, sodium chloride, ondansetron **OR** ondansetron, polyethylene glycol, acetaminophen **OR** acetaminophen, LORazepam, hydrALAZINE      Intake/Output Summary (Last 24 hours) at 7/26/2025 1530  Last data filed at 7/26/2025 0853  Gross per 24 hour   Intake 205 ml   Output --   Net 205 ml     Exam:  /66   Pulse 52   Temp 98.3 °F (36.8 °C) (Oral)   Resp 18   Ht 1.666 m (5' 5.59\")   Wt 86.7 kg (191 lb 3.2 oz)   SpO2 99%   BMI 31.25 kg/m²   Physical Exam  Cardiovascular:      Rate and Rhythm: Normal rate and regular rhythm.   Pulmonary:      Effort: Pulmonary effort is normal. No respiratory distress.   Abdominal:      Palpations: Abdomen is soft.      Tenderness: There is no abdominal tenderness.   Neurological:      Mental Status: She is alert and oriented to person, place, and time. Mental status is at baseline.   Psychiatric:         Mood and Affect: Mood normal.         Behavior: Behavior normal.       Labs:   Recent Labs     07/24/25  0916 07/25/25  0819   WBC 24.6* 17.1* 
Hospitalist Progress Note      PCP: Kobe Duke MD    Date of Admission: 7/19/2025    Chief Complaint:  Confusion    Subjective:  No acute events overnight. Pt reports feeling well. Denies chest pain or shortness of breath.    Medications:  Reviewed    Infusion Medications    sodium chloride      sodium chloride       Scheduled Medications    metoprolol tartrate  75 mg Oral BID    apixaban  5 mg Oral BID    levETIRAcetam  750 mg Oral BID    lactobacillus  1 capsule Oral Daily with breakfast    levothyroxine  137 mcg Oral Daily    pantoprazole  40 mg Oral QAM AC    venlafaxine  150 mg Oral Daily    budesonide-formoterol  2 puff Inhalation BID RT    sodium chloride flush  5-40 mL IntraVENous 2 times per day    atorvastatin  80 mg Oral Nightly    sodium chloride flush  5-40 mL IntraVENous 2 times per day     PRN Meds: metoprolol, calcium carbonate, labetalol, sodium chloride flush, sodium chloride, ipratropium 0.5 mg-albuterol 2.5 mg, sodium chloride, ondansetron **OR** ondansetron, polyethylene glycol, acetaminophen **OR** acetaminophen, LORazepam, hydrALAZINE      Intake/Output Summary (Last 24 hours) at 7/27/2025 1620  Last data filed at 7/26/2025 1739  Gross per 24 hour   Intake 236 ml   Output --   Net 236 ml     Exam:  /66   Pulse 58   Temp 98.2 °F (36.8 °C) (Oral)   Resp 18   Ht 1.666 m (5' 5.59\")   Wt 86.7 kg (191 lb 3.2 oz)   SpO2 95%   BMI 31.25 kg/m²   Physical Exam  Cardiovascular:      Rate and Rhythm: Normal rate and regular rhythm.   Pulmonary:      Effort: Pulmonary effort is normal. No respiratory distress.   Abdominal:      Palpations: Abdomen is soft.      Tenderness: There is no abdominal tenderness.   Neurological:      Mental Status: She is alert and oriented to person, place, and time. Mental status is at baseline.   Psychiatric:         Mood and Affect: Mood normal.         Behavior: Behavior normal.       Labs:   Recent Labs     07/25/25  0819 07/27/25  0515   WBC 17.1* 13.3* 
Hospitalist Progress Note      PCP: Kobe Duke MD    Date of Admission: 7/19/2025    Chief Complaint:  Confusion    Subjective:  No acute events overnight. Unable to answer questions. Son and sister at bedside.    Medications:  Reviewed    Infusion Medications    sodium chloride      sodium chloride      0.9% NaCl with KCl 20 mEq 75 mL/hr at 07/22/25 2212     Scheduled Medications    ipratropium 0.5 mg-albuterol 2.5 mg  1 Dose Inhalation BID RT    acyclovir  10 mg/kg (Adjusted) IntraVENous Q8H    levothyroxine  137 mcg Oral Daily    lisinopril  5 mg Oral Daily    metoprolol succinate  50 mg Oral Daily    pantoprazole  40 mg Oral QAM AC    venlafaxine  150 mg Oral Daily    budesonide-formoterol  2 puff Inhalation BID RT    doxycycline (VIBRAMYCIN) IV  100 mg IntraVENous Q12H    sodium chloride flush  5-40 mL IntraVENous 2 times per day    levETIRAcetam  750 mg IntraVENous Q12H    aspirin  300 mg Rectal Daily    atorvastatin  80 mg Oral Nightly    sodium chloride flush  5-40 mL IntraVENous 2 times per day    enoxaparin  40 mg SubCUTAneous Daily    methylPREDNISolone  40 mg IntraVENous Q12H    cefTRIAXone (ROCEPHIN) IV  1,000 mg IntraVENous Q24H     PRN Meds: labetalol, sodium chloride flush, sodium chloride, ipratropium 0.5 mg-albuterol 2.5 mg, sodium chloride, ondansetron **OR** ondansetron, polyethylene glycol, acetaminophen **OR** acetaminophen, LORazepam, hydrALAZINE      Intake/Output Summary (Last 24 hours) at 7/23/2025 0836  Last data filed at 7/23/2025 0644  Gross per 24 hour   Intake --   Output 500 ml   Net -500 ml     Exam:  Vitals:     07/21/25 1236   BP: (!) 159/96   Pulse: 92   Resp:     Temp: 98.1 °F (36.7 °C)   SpO2: 93%   Physical Exam  Constitutional:       Appearance: She is ill-appearing.   Cardiovascular:      Rate and Rhythm: Normal rate and regular rhythm.   Pulmonary:      Effort: Pulmonary effort is normal. No respiratory distress.      Comments: NC in place.  Abdominal:      Palpations: 
INPATIENT PROGRESS NOTES    PATIENT NAME: Lin Ferrell  MRN: [id number]  SERVICE DATE:  July 25, 2025   SERVICE TIME:  3:08 PM      PRIMARY SERVICE: Pulmonary Disease    CHIEF COMPLAIN: Pulmonary infiltrates      INTERVAL HPI: Patient seen and examined at bedside, Interval Notes, orders reviewed. Nursing notes noted  No chest pain.  No shortness of breath.  No coughing.  On 2 L O2 via nasal cannula O2 saturation 94%.         OBJECTIVE   I/O:24HR INTAKE/OUTPUT:    Intake/Output Summary (Last 24 hours) at 7/25/2025 1508  Last data filed at 7/25/2025 0009  Gross per 24 hour   Intake --   Output 500 ml   Net -500 ml     07/24 0701 - 07/25 0700  In: -   Out: 500 [Urine:500]  Body mass index is 31.25 kg/m².    PHYSICAL EXAM:  Vitals:  /67   Pulse 55   Temp 98.4 °F (36.9 °C) (Oral)   Resp 16   Ht 1.666 m (5' 5.59\")   Wt 86.7 kg (191 lb 3.2 oz)   SpO2 94%   BMI 31.25 kg/m²     General:, Obese alert, awake .comfortable in bed, No distress.  Head: Atraumatic , Normocephalic   Eyes: PERRL. No sclera icterus. No conjunctival injection. No discharge   ENT: No nasal  discharge. Pharynx clear.  Neck:  Trachea midline. No thyromegaly, no JVD, No cervical adenopathy.  Chest : Bilaterally symmetrical ,Normal effort,  No accessory muscle use  Lung : Diminished breath sound bilaterally No Rales. No wheezing. No rhonchi.   Heart:: Normal rate. Regular rhythm. No mumur ,  Rub or gallop  ABD: Non-tender. Non-distended. No masses. No organmegaly. Normal bowel sounds. No hernia.  Ext : No Pitting both leg , No Cyanosis No clubbing  Neuro: no focal weakness    Labs:  CBC:   Recent Labs     07/23/25  0507 07/24/25  0916 07/25/25  0819   WBC 19.2* 24.6* 17.1*   HGB 15.1 14.8 13.6   HCT 45.9 44.1 41.9    447* 420*     BMP:    Recent Labs     07/23/25  0507 07/24/25  0916 07/25/25  0819   * 131* 138   K 4.2 4.1 3.9    101 105   CO2 19* 17* 20   BUN 27* 31* 30*   CREATININE 0.50 0.70 0.68   GLUCOSE 125* 187* 146* 
INPATIENT PROGRESS NOTES    PATIENT NAME: Lin Ferrell  MRN: [id number]  SERVICE DATE:  July 23, 2025   SERVICE TIME:  1:29 PM      PRIMARY SERVICE: Pulmonary Disease    CHIEF COMPLAINTS: Pulmonary infiltrate    INTERVAL HPI: Patient seen and examined at bedside, Interval Notes, orders reviewed. Nursing notes noted    Patient report no chest pain, no coughing, no shortness of breath, she just underwent LP, no nausea or vomiting.  She is on 2 L O2 saturation 95    New information updated in the note today, rest of the examination did not change compared to yesterday.    Review of system:     GI Abdominal pain No  Skin Rash No    Social History     Tobacco Use    Smoking status: Every Day     Types: Cigarettes    Smokeless tobacco: Never    Tobacco comments:     \"a cigarette and a half a day\"   Substance Use Topics    Alcohol use: No     History reviewed. No pertinent family history.      OBJECTIVE    Body mass index is 31.25 kg/m².    PHYSICAL EXAM:  Vitals:  /89   Pulse 75   Temp 97.7 °F (36.5 °C) (Oral)   Resp 16   Ht 1.666 m (5' 5.59\")   Wt 86.7 kg (191 lb 3.2 oz)   SpO2 95%   BMI 31.25 kg/m²     General: alert, cooperative, no distress  Head: normocephalic, atraumatic  Eyes:No gross abnormalities.  ENT:  MMM no lesions  Neck:  supple and no masses  Chest : Few basal rales, no wheezing clear to auscultation, nontender, tympanic  Heart:: Heart sounds are normal.  Regular rate and rhythm without murmur, gallop or rub.  ABD:  symmetric, soft, non-tender  Musculoskeletal : no cyanosis, no clubbing, and no edema  Neuro:  Grossly normal  Skin: No rashes or nodules noted.  Lymph node:  no cervical nodes  Urology: No Chacko   Psychiatric: appropriate    DATA:   Recent Labs     07/22/25  0516 07/23/25  0507   WBC 16.4* 19.2*   HGB 14.4 15.1   HCT 43.9 45.9   MCV 87.5 88.1    382     Recent Labs     07/22/25  0516 07/23/25  0507    134*   K 4.1 4.2    101   CO2 19* 19*   BUN 24* 27*   CREATININE 
INPATIENT PROGRESS NOTES    PATIENT NAME: Lin Ferrell  MRN: [id number]  SERVICE DATE:  July 24, 2025   SERVICE TIME:  9:53 AM      PRIMARY SERVICE: Pulmonary Disease    CHIEF COMPLAINTS: Pulmonary infiltrate    INTERVAL HPI: Patient seen and examined at bedside, Interval Notes, orders reviewed. Nursing notes noted    No chest pain, no shortness of breath and no coughing.No issues overnight on 2 L O2 saturation 97%, no fever    New information updated in the note today, rest of the examination did not change compared to yesterday.    Review of system:     GI Abdominal pain No  Skin Rash No    Social History     Tobacco Use    Smoking status: Every Day     Types: Cigarettes    Smokeless tobacco: Never    Tobacco comments:     \"a cigarette and a half a day\"   Substance Use Topics    Alcohol use: No     History reviewed. No pertinent family history.      OBJECTIVE    Body mass index is 31.25 kg/m².    PHYSICAL EXAM:  Vitals:  BP (!) 109/57   Pulse 83   Temp 97.8 °F (36.6 °C) (Oral)   Resp 18   Ht 1.666 m (5' 5.59\")   Wt 86.7 kg (191 lb 3.2 oz)   SpO2 97%   BMI 31.25 kg/m²     General: alert, cooperative, no distress  Head: normocephalic, atraumatic  Eyes:No gross abnormalities.  ENT:  MMM no lesions  Neck:  supple and no masses  Chest : Few basal rales, no wheezing clear to auscultation, nontender, tympanic  Heart:: Heart sounds are normal.  Regular rate and rhythm without murmur, gallop or rub.  ABD:  symmetric, soft, non-tender  Musculoskeletal : no cyanosis, no clubbing, and no edema  Neuro:  Grossly normal  Skin: No rashes or nodules noted.  Lymph node:  no cervical nodes  Urology: No Chacko   Psychiatric: appropriate    DATA:   Recent Labs     07/23/25  0507 07/24/25  0916   WBC 19.2* 24.6*   HGB 15.1 14.8   HCT 45.9 44.1   MCV 88.1 87.5    447*     Recent Labs     07/23/25  0507 07/24/25  0916   * 131*   K 4.2 4.1    101   CO2 19* 17*   BUN 27* 31*   CREATININE 0.50 0.70   GLUCOSE 125* 187* 
INPATIENT PROGRESS NOTES    PATIENT NAME: Lin Ferrell  MRN: [id number]  SERVICE DATE:  July 26, 2025   SERVICE TIME:  11:50 AM      PRIMARY SERVICE: Pulmonary Disease    CHIEF COMPLAINTS: Pulmonary infiltrate    INTERVAL HPI: Patient seen and examined at bedside, Interval Notes, orders reviewed. Nursing notes noted    No chest pain, no shortness of breath and no coughing.No issues overnight on room air saturating 92%    New information updated in the note today, rest of the examination did not change compared to yesterday.    Review of system:     GI Abdominal pain No  Skin Rash No    Social History     Tobacco Use    Smoking status: Every Day     Types: Cigarettes    Smokeless tobacco: Never    Tobacco comments:     \"a cigarette and a half a day\"   Substance Use Topics    Alcohol use: No     History reviewed. No pertinent family history.      OBJECTIVE    Body mass index is 31.25 kg/m².    PHYSICAL EXAM:  Vitals:  /68   Pulse 63   Temp 98.8 °F (37.1 °C) (Oral)   Resp 18   Ht 1.666 m (5' 5.59\")   Wt 86.7 kg (191 lb 3.2 oz)   SpO2 92%   BMI 31.25 kg/m²     General: alert, cooperative, no distress  Head: normocephalic, atraumatic  Eyes:No gross abnormalities.  ENT:  MMM no lesions  Neck:  supple and no masses  Chest : Few basal rales, no wheezing clear to auscultation, nontender, tympanic  Heart:: Heart sounds are normal.  Regular rate and rhythm without murmur, gallop or rub.  ABD:  symmetric, soft, non-tender  Musculoskeletal : no cyanosis, no clubbing, and no edema  Neuro:  Grossly normal  Skin: No rashes or nodules noted.  Lymph node:  no cervical nodes  Urology: No Chacko   Psychiatric: appropriate    DATA:   Recent Labs     07/24/25  0916 07/25/25  0819   WBC 24.6* 17.1*   HGB 14.8 13.6   HCT 44.1 41.9   MCV 87.5 87.7   * 420*     Recent Labs     07/24/25  0916 07/25/25  0819   * 138   K 4.1 3.9    105   CO2 17* 20   BUN 31* 30*   CREATININE 0.70 0.68   GLUCOSE 187* 146*   CALCIUM 
INPATIENT PROGRESS NOTES    PATIENT NAME: Lin Ferrell  MRN: [id number]  SERVICE DATE:  July 28, 2025   SERVICE TIME:  11:40 AM      PRIMARY SERVICE: Pulmonary Disease    CHIEF COMPLAIN: Pulmonary infiltrates      INTERVAL HPI: Patient seen and examined at bedside, Interval Notes, orders reviewed. Nursing notes noted  No chest pain.  No shortness of breath.  No coughing.  On 2 L O2 via nasal cannula O2 saturation 95%.  She wants to go home         OBJECTIVE   I/O:24HR INTAKE/OUTPUT:  No intake or output data in the 24 hours ending 07/28/25 1140    No intake/output data recorded.  Body mass index is 31.25 kg/m².    PHYSICAL EXAM:  Vitals:  /65   Pulse 50   Temp 98.2 °F (36.8 °C) (Oral)   Resp 16   Ht 1.666 m (5' 5.59\")   Wt 86.7 kg (191 lb 3.2 oz)   SpO2 95%   BMI 31.25 kg/m²     General:, Obese alert, awake .comfortable in bed, No distress.  Head: Atraumatic , Normocephalic   Eyes: PERRL. No sclera icterus. No conjunctival injection. No discharge   ENT: No nasal  discharge. Pharynx clear.  Neck:  Trachea midline. No thyromegaly, no JVD, No cervical adenopathy.  Chest : Bilaterally symmetrical ,Normal effort,  No accessory muscle use  Lung : Diminished breath sound bilaterally No Rales. No wheezing. No rhonchi.   Heart:: Normal rate. Regular rhythm. No mumur ,  Rub or gallop  ABD: Non-tender. Non-distended. No masses. No organmegaly. Normal bowel sounds. No hernia.  Ext : No Pitting both leg , No Cyanosis No clubbing  Neuro: no focal weakness    Labs:  CBC:   Recent Labs     07/27/25  0515 07/28/25  0518   WBC 13.3* 12.6*   HGB 13.2 13.8   HCT 40.9 42.7   * 500*     BMP:    No results for input(s): \"NA\", \"K\", \"CL\", \"CO2\", \"BUN\", \"CREATININE\", \"GLUCOSE\", \"CALCIUM\", \"MG\", \"PHOS\" in the last 72 hours.    Invalid input(s): \"IONCA\"    HEPATIC: No results for input(s): \"AST\", \"ALT\", \"BILITOT\", \"ALKPHOS\" in the last 72 hours.    Invalid input(s): \"ALB\"      MV Settings:        No results for input(s): 
[facility] OCCUPATIONAL THERAPY EVALUATION - ACUTE     NAME: Lin Ferrell  : 1955 (70 y.o.)  MRN: [id number]  CODE STATUS: DNR-CCA  Room: W286/W286-01    Date of Service: 2025    Patient Diagnosis(es): Stroke-like symptom [R29.90]  Stroke determined by clinical assessment ([facility]) [I63.9]  Encephalopathy acute [G93.40]   Patient Active Problem List    Diagnosis Date Noted    Encephalopathy acute 2025    Stroke determined by clinical assessment ([facility]) 2025    Pneumonia in infectious disease 2025    COPD exacerbation ([facility]) 2021      Cerebrovascular accident (CVA) due to embolism of right middle cerebral artery ([facility])    Past Medical History:   Diagnosis Date    Asthma     Cerebral artery occlusion with cerebral infarction ([facility])     2021    Depression     Migraine     Thyroid disease     hypothyroid     Past Surgical History:   Procedure Laterality Date    HYSTERECTOMY (CERVIX STATUS UNKNOWN)      KNEE SURGERY      left knee total replacement        Restrictions  Restrictions/Precautions: Fall Risk;NPO  Activity Level: Up as Tolerated   Up as Tolerated             Safety Devices: Safety Devices  Type of Devices: All fall risk precautions in place;Call light within reach;Left in bed     Treatment Diagnosis:   Decreased Activities of Daily Living Z73.6    Patient's date of birth confirmed: Pt verbally unable, verified via wrist band    General:       Subjective:Pt cooperative.          Pain at start of treatment: No    Pain at end of treatment: No    Location: N/A  Description: N/A  Nursing notified: No  RN: N/A  Intervention: Repositioned    Prior Level of Function:  Social/Functional History  Lives With:  (Sister)  Additional Comments: Pt unable to provide PLOF or home set up    OBJECTIVE:     Orientation Status:  Orientation  Orientation Level: Oriented to person    Observation:  Observation/Palpation  Posture: Fair  Observation: flexed posture    Cognition Status:  Cognition  Cognition 
[facility] OCCUPATIONAL THERAPY MED SURG TREATMENT NOTE     Date: 2025  Patient Name: Lin Ferrell        MRN: [id number]  Account: [id number]   : 1955  (70 y.o.)  Room: [place] [address]    Chart Review:    Restrictions        Safety:  Safety Devices  Type of Devices: All fall risk precautions in place    Patient's birthday verified: Yes    Subjective:     \" Id like to go home.\"       Pain at start of treatment: No    Pain at end of treatment: No    Objective:    ADL Status:  ADL  Feeding: Unable to assess (Comment)  Grooming: None  UE Bathing: Supervision  LE Bathing: Minimal assistance  LE Bathing Skilled Clinical Factors: pt did the majority but needed assist with drying off buttock area d/t fatigue from walking to the bathroom and completing toileting tasks- pt completed perineal care part of LB bathing post toileting  UE Dressing Skilled Clinical Factors: hospital gown  LE Dressing: None  Putting On/Taking Off Footwear: Stand by assistance;Increased time to complete  Putting On/Taking Off Footwear Skilled Clinical Factors: fig 4 BLE to don/doff socks- vc's for breathing d/t pt holding breath  Toileting: Stand by assistance  Functional Mobility: Stand by assistance  Functional Mobility Skilled Clinical Factors: pt sligtly shaky from when completing mobility and demo self awareness of deficits stating she did not need a walker, post standing unsteadily, pt was asked to sit back down on EOB, then complete task again using FWW, doing much better with UB stability  Toilet Transfers  Toilet - Technique: Ambulating  Equipment Used: Standard toilet  Toilet Transfer: Stand by assistance  Toilet Transfers Comments: grab bars present, vc's for transitional safety from walker to grab bars, d/t pt attempting to hold to FWW to complete descent to toilet, being unsafe per pt balance-strength at this point- educated pt on this    Therapy key for assistance levels -   Independent/Mod I = Pt. is able to perform task 
[facility] OCCUPATIONAL THERAPY MED SURG TREATMENT NOTE     Date: 2025  Patient Name: Lin Ferrell        MRN: [id number]  Account: [id number]   : 1955  (70 y.o.)  Room: [place] [address]    Chart Review:    Restrictions  Restrictions/Precautions  Restrictions/Precautions: Fall Risk     Safety:  Safety Devices  Type of Devices: All fall risk precautions in place;Call light within reach;Left in bed;Bed alarm in place    Patient's birthday verified: Yes    Subjective:    Subjective: \"Oh no, there's more?\"       Pain at start of treatment: No    Pain at end of treatment: No    Objective:    ADL Status:       TOM Hose donned: No  If no - why  not ordered    Therapy key for assistance levels -   Independent/Mod I = Pt. is able to perform task with no assistance but may require a device   Stand by assistance = Pt. does not perform task at an independent level but does not need physical assistance, requires verbal cues  Minimal, Moderate, Maximal Assistance = Pt. requires physical assistance (25%, 50%, 75% assist from helper) for task but is able to actively participate in task   Dependent = Pt. requires total assistance with task and is not able to actively participate with task completion    Orientation Status:  Orientation  Overall Orientation Status: Within Functional Limits    Observation:  Observation/Palpation  Observation: Pt supine in bed upon arrival, pleasant and agreeable to OT eval. On O2 via NC. Does not appear to be in acute distress. Sister present at bedside at start of session.    Cognition Status:  Cognition  Overall Cognitive Status: Exceptions  Arousal/Alertness: Appears intact  Following Commands: Follows one step commands with repetition  Attention Span: Attends with cues to redirect  Problem Solving: Assistance required to identify errors made  Insights: Decreased awareness of deficits  Initiation: Requires cues for some  Sequencing: Requires cues for some       Functional Mobility:  Patient 
[facility]   Facility/Department: [facility] 2W ORTHO TELE  Speech Language Pathology    Lin Ferrell  1955  W286/W286-01    Date: 7/20/2025      Speech Therapy attempted to see Lin Ferrell on this date for a/an:    Clinical Bedside Swallow Evaluation    Pt was unable to be seen due to:   Patient is too lethargic to participate. Unable to maintain alertness to participate at this time. Will reattempt as able.       Electronically signed by BENNETT Garcia on 7/20/2025 at 11:05 AM   
[facility]   Facility/Department: [facility] 2W ORTHO TELE  Speech Language Pathology    Lin Ferrell  1955  W286/W286-01    Date: 7/24/2025      Speech Therapy attempted to see Lin Ferrell on this date for a/an:    Treatment    Pt was unable to be seen due to:   Patient is too lethargic to participate. Patient asleep upon SLP arrival. SLP attempted verbal/tactile cues, repositioning, turning on lights but patient remained asleep. Speech therapy to reattempt as able.         Electronically signed by BENNETT Mckeon on 7/24/25 at 10:45 AM EDT\  
[facility]  Facility/Department: [facility] 2W ORTHO TELE  Speech Language Pathology   Treatment Note      Lin Ferrell  1955  W286/W286-01  [x]   confirmed      Date: 2025    Stroke-like symptom [R29.90]  Stroke determined by clinical assessment ([facility]) [I63.9]  Encephalopathy acute [G93.40]    Restrictions/Precautions: Fall Risk, NPO    Diet NPO     Respiratory Status:O2 Flow Rate (L/min): 2 L/min (25 0718)   No active isolations      Subjective:  Alert, Cooperative, and Pleasant      Improved mental status     Interventions used this date:  Dysphagia Treatment      Objective/Assessment:  Patient progressing towards goals:  Short Term Goals  Time Frame for Short Term Goals: 1 week  Goal 1: Following oral care, patient will tolerate PO trials deemed appropriate by treating SLP to assess readiness for diet initiation or need for instrumental assessment.  Pt seen this date with improved mental status. Pt upright in bed and followed all commands.   Pt tolerated 6/6 trials of pureed solids, 5/5 trials of regular solids, and 8oz of thin liquids via straw with adequate mastication, bolus propulsion, and no overt s/s of aspiration. Adequate oral clearance following all trials.  Pt denies any hx of dysphagia.     Goal 2: Pt will demonstrate simple oral motor movements in conjunction with oral care to facilitate musculature movement and coordination to improve swallow function.  Pt demonstrated adequate labial/lingual/buccal strength and coordination. D/C goal.     Recommend upgrade to  Regular diet and Thin liquids, with set up assist.  Discussed with Nisreen WISE.      Updated goals/frequency:   1x week   Pt will tolerate the recommended diet level with no s/s of aspiration to assess diet tolerance prior to d/c    2. . Pt will demonstrate use of strategies for safe and efficient swallow of recommended diet in all given opportunities.     Treatment/Activity Tolerance:  Patient tolerated treatment 
[facility]   Facility/Department: [facility] [place] 2W ORTHO TELE  Speech Language Pathology  Treatment Note      Lin Ferrell  1955  W286/W286-01  [x]   confirmed      Date: 2025    Stroke-like symptom [R29.90]  Stroke determined by clinical assessment ([facility]) [I63.9]  Encephalopathy acute [G93.40]    Restrictions/Precautions: Fall Risk    ADULT DIET; Regular  ADULT ORAL NUTRITION SUPPLEMENT; Breakfast, Lunch, Dinner; Standard High Calorie/High Protein Oral Supplement     Respiratory Status:O2 Flow Rate (L/min): 0 L/min (25)   No active isolations    Subjective:  Alert, Cooperative, and Pleasant   Sister present. Pt and sister report pt is back to baseline.        Interventions used this date:  Expressive Language, Receptive Language, and Dysphagia Treatment      Objective/Assessment:  Patient progressing towards goals:  Short Term Goals  Time Frame for Short Term Goals: 1 week  Goal 1: Pt will tolerate the recommended diet level with no s/s of aspiration to assess diet tolerance prior to d/c  Pt tolerated 4/4 regular solids and 5/5 thin liquids with no s/s of aspiration.  Goal 2: Pt will demonstrate use of strategies for safe and efficient swallow of recommended diet in all given opportunities.  Pt ind took small sips at a slower rate. Pt agreeable to education on ongoing use.     Short Term Goals  Time Frame for Short Term Goals: 1-2 weeks  Goal 1: Patient will participate in repeat assessment of cognitive-linguistic skills to determine current needs within 1-5 days as able.  Pt exhibited expressive and receptive language deemed within functional limits. Pt was able to complete naming tasking, comprehension tasks, follow multi step directions, complete abstract reasoning, word fluency, and reading/writing tasks independently. Pt’s speech is 100% intelligible in conversational speech with timely articulator movements.   No further speech/language therapy warranted.       Rec d/c from ST program as pt has 
[facility]   Facility/Department: [address] TELE  Speech Language Pathology  Clinical Bedside Swallow Evaluation    NAME: Lin Ferrell  : 1955  MRN: [id number]    ADMISSION DATE: 2025  ADMITTING DIAGNOSIS: has COPD exacerbation (HCC); Pneumonia in infectious disease; Stroke determined by clinical assessment ([facility]); and Encephalopathy acute on their problem list.  Past Medical History:  has a past medical history of Asthma, Cerebral artery occlusion with cerebral infarction (HCC), Depression, Migraine, and Thyroid disease.  Past Surgical History:  has a past surgical history that includes Hysterectomy and knee surgery.  Allergies:   Allergies   Allergen Reactions    Carrot Other (See Comments)    Cashew Nut Oil     Celery Oil     Ciprofloxacin Nausea Only    Sulfa Antibiotics      Was told by mother pt is allergic    Zithromax [Azithromycin]      Halucinations         Recent Chest Xray/CT of Chest:   Results for orders placed during the hospital encounter of 25    XR CHEST PORTABLE    Narrative  EXAMINATION:  ONE XRAY VIEW OF THE CHEST    2025 1:38 pm    COMPARISON:  2021 1720 hours.    HISTORY:  ORDERING SYSTEM PROVIDED HISTORY: SOB  TECHNOLOGIST PROVIDED HISTORY:  Reason for exam:->SOB  What reading provider will be dictating this exam?->CRC    FINDINGS:  Single view of the chest is submitted.    Limited examination due to patient position.    The cardiac silhouette is enlarged    Pulmonary vasculature is unremarkable.    Right-sided trachea.    Bibasilar areas of atelectasis versus patchy infiltrates    No effusion    No pneumothoraces.    Impression  Bibasilar areas of atelectasis versus patchy infiltrates     No results found for this or any previous visit.      ONSET DATE: 25    Date of Eval: 2025    Evaluating Therapist: BENNETT Vaughan    Recommended Diet and Intervention  Solid Consistency Recommendation: NPO  Liquid Consistency Recommendation: 
[facility]  Facility/Department: [facility] 2W ORTHO TELE  Speech Language Pathology   Treatment Note      Lin Ferrell  1955  W286/W286-01  [x]   confirmed      Date: 2025    Stroke-like symptom [R29.90]  Stroke determined by clinical assessment ([facility]) [I63.9]  Encephalopathy acute [G93.40]    Restrictions/Precautions: Fall Risk    ADULT DIET; Regular  ADULT ORAL NUTRITION SUPPLEMENT; Breakfast, Lunch, Dinner; Standard High Calorie/High Protein Oral Supplement     Respiratory Status:O2 Flow Rate (L/min): 0 L/min (25)   No active isolations      Subjective:  Lethargic        Interventions used this date:  Expressive Language and Receptive Language      Objective/Assessment:  Patient progressing towards goals:  Short Term Goals  Time Frame for Short Term Goals: 1-2 weeks    Goal 1: Pt will complete confrontational naming tasks with 70% accuracy with mild verbal cues to help the patient express their basic wants and needs.  100% accuracy independently    Goal 2: Pt will answer low level yes/no questions with 80% accuracy with min cues to assist the caregiver in obtaining important information regarding the patient's personal, medical, and safety needs.  Low Level: 100% accuracy independently  Mid-High level: 100% accuracy independently    Goal 3: Pt will follow 1 step directions given orally with 70% accuracy with min cues to increase the pt's ability to follow directions provided by caregivers for safe follow through with ADLs.  100% accuracy independently    Goal 4: Pt will identify objects/pictures within a field of 2-4 with 90% accuracy with min cues in order to increase his/her understanding of objects in his/her environment for safer and more independent completion of ADLs.    Patient participated in minimal trials of confrontation naming, yes/no questions, and 1-step directions. She required max cues to remain alert enough to participate. Although patient was independently w/ goals, she was 
[facility]   Facility/Department: [address] TELE  Speech Language Pathology  Initial Speech/Language/Cognitive Assessment  NAME: Lin Ferrell  : 1955   MRN: [id number]  ADMISSION DATE: 2025  ADMITTING DIAGNOSIS: has COPD exacerbation (HCC); Pneumonia in infectious disease; Stroke determined by clinical assessment ([facility]); and Encephalopathy acute on their problem list.  DATE ONSET: 25    Date of Eval: 2025   Evaluating Therapist: BENNETT Vaughan    Assessment Summary: Pt presents with mod-severe aphasia characterized by reduced ability to complete confrontational naming tasks, answer yes/no questions, follow directions, or receptively identify objects. Assessment is limited due to pts inability to follow directions.     Requires SLP Intervention: Yes  D/C Recommendations: Ongoing speech therapy is recommended during this hospitalization    General  Comments: 70-year-old female with a past medical history of CVA, thyroid disease, recently diagnosed with diverticulitis and finishing a course of Flagyl and Cipro, COPD, transferred here from [place] for evaluation of possible CVA.  According to the patient's sister, she was in her usual state of health earlier in the day, went to the bathroom and sat on the toilet seat for 1 to 2 hours unable to get up.  When paramedics arrived, she was found to have low oxygen saturation with some wheezing.  He was also confused.  Blood sugar checked at the scene was 160.  She was subsequently admitted at [facility] for metabolic encephalopathy of unknown etiology.  She is transferred here for neuro services  Subjective: Pt alert upon SLP arrival. Inconsistent with answering simple yes/no questions and 1 step directions. Pseudobulbar affect suspected due to inappropriate laughter throughout evaluation.  Behavior/Cognition: Alert;Doesn't follow directions  Respiratory Status: Room air  O2 Device: None (Room air)    Vision/Hearing  Vision: Within Functional 
[facility]  Facility/Department: [facility] [place] 2 ORTHO TELE  Speech Language Pathology   Treatment Note      Lin Ferrell  1955  W286/W286-01  [x]   confirmed      Date: 2025    Stroke-like symptom [R29.90]  Stroke determined by clinical assessment ([facility]) [I63.9]  Encephalopathy acute [G93.40]    Restrictions/Precautions: Fall Risk, NPO    Diet NPO     Respiratory Status:Room air  No active isolations      Subjective:  Alert, Cooperative, and Lethargic        Interventions used this date:  Expressive Language, Receptive Language, and Dysphagia Treatment      Objective/Assessment:  Patient progressing towards goals:  Short Term Goals  Time Frame for Short Term Goals: 1 week  Goal 1: Following oral care, patient will tolerate PO trials deemed appropriate by treating SLP to assess readiness for diet initiation or need for instrumental assessment.  SLP attempted to sit patient upright in bed. Pt verbalized \"no\" and that is enough. Pt educated on the importance of sitting upright when eating/drinking. SLP was able to put pt upright in bed with verbal encouragement. Pt trialed ice chips via spoon 3x. Delayed lingual propulsion as well as suspected delayed initiation of swallow present in 3/3 trials. Delayed cough noted in 3/3 trials.   Goal 2: Pt will demonstrate simple oral motor movements in conjunction with oral care to facilitate musculature movement and coordination to improve swallow function.  SLP preformed extensive oral care on pt with a toothlette oral care swab. Pt required min-mod verbal encouragement to complete oral care. Educated provided on the importance of oral care. Oral care completed. Pt was able to stick tongue out 2x given max prompts. Pt did not demonstrate other oral motor movements despite given max prompts, cues, and verbal encouragement. Pt would verbalize \"no\".   Short Term Goals  Time Frame for Short Term Goals: 1-2 weeks  Goal 1: Pt will complete confrontational naming tasks with 70% 
Monitor room tech initiatied rapid response d/t patient HR sustaining 's for a few minutes.     Upon entering the room, patient is asymptomatic, reports no chest pain, SOB    Vitals: 122/84, 108 HR, 99% RA    Dr. Sawyer at bedside, ordered EKG   
NPOX DONE RESTING RA TOTAL DESAT TIME 38 MINUTES  
Patient off the floor in ultrasound.  Will try back later to do ABG.  
Physical [facility]  Facility/Department: [facility] MED SURG W286/W286-01  Physical Therapy Discharge      NAME: Lin Ferrell    : 1955 (70 y.o.)  MRN: [id number]    Account: [id number]  Gender: female      Patient has been discharged from acute care hospital. DC patient from current PT program.      Electronically signed by Tamara Garcia PT on 25 at 2:44 PM EDT    
Physical Therapy Med Surg Daily Treatment Note  Facility/Department: [address] ORTHO TELE  Room: [place] [id number]       NAME: Lin Ferrell  : 1955 (70 y.o.)  MRN: [id number]  CODE STATUS: DNR-CCA    Date of Service: 2025    Patient Diagnosis(es): Stroke-like symptom [R29.90]  Stroke determined by clinical assessment ([facility]) [I63.9]  Encephalopathy acute [G93.40]   No chief complaint on file.    Patient Active Problem List    Diagnosis Date Noted    New onset a-fib ([facility]) 2025    Cerebrovascular accident (CVA) due to embolism of right middle cerebral artery ([facility]) 2025    Encephalopathy acute 2025    Stroke determined by clinical assessment ([facility]) 2025    Pneumonia in infectious disease 2025    Chronic obstructive pulmonary disease ([facility]) 2021        Past Medical History:   Diagnosis Date    Asthma     Cerebral artery occlusion with cerebral infarction ([facility])     2021    Depression     Migraine     Thyroid disease     hypothyroid     Past Surgical History:   Procedure Laterality Date    HYSTERECTOMY (CERVIX STATUS UNKNOWN)      KNEE SURGERY      left knee total replacement    LUMBAR PUNCTURE N/A 2025    by Dr. Ann       Chart Reviewed: Yes  Family/Caregiver Present: No    Restrictions:  Restrictions/Precautions: Fall Risk    SUBJECTIVE:   Subjective: I have to use the bathroom.    Pain  Pain  Pre-Pain: 0  Post-Pain: 0    OBJECTIVE:        Bed mobility  Supine to Sit: Partial/Moderate assistance  Sit to Supine: Partial/Moderate assistance  Bed Mobility Comments: Bed flat with use of hand rails; hand over hand assist for initiation and sequencing. Increased time to complete. vc;s for sequencing and technique    Transfers  Sit to Stand: Minimal Assistance  Stand to Sit: Minimal Assistance  Bed to Chair: Minimal assistance  Comment: vcs' for hand placement and technique with HHA. transfer to and from bedside commode with HHA, improved stability and standing tolerance.        
Physical Therapy Med Surg Daily Treatment Note  Facility/Department: [address] ORTHO TELE  Room: [place] [id number]       NAME: Lin Ferrell  : 1955 (70 y.o.)  MRN: [id number]  CODE STATUS: DNR-CCA    Date of Service: 2025    Patient Diagnosis(es): Stroke-like symptom [R29.90]  Stroke determined by clinical assessment ([facility]) [I63.9]  Encephalopathy acute [G93.40]   No chief complaint on file.    Patient Active Problem List    Diagnosis Date Noted    New onset a-fib ([facility]) 2025    Cerebrovascular accident (CVA) due to embolism of right middle cerebral artery ([facility]) 2025    Encephalopathy acute 2025    Stroke determined by clinical assessment ([facility]) 2025    Pneumonia in infectious disease 2025    Chronic obstructive pulmonary disease ([facility]) 2021        Past Medical History:   Diagnosis Date    Asthma     Cerebral artery occlusion with cerebral infarction ([facility])     2021    Depression     Migraine     Thyroid disease     hypothyroid     Past Surgical History:   Procedure Laterality Date    HYSTERECTOMY (CERVIX STATUS UNKNOWN)      KNEE SURGERY      left knee total replacement    LUMBAR PUNCTURE N/A 2025    by Dr. Ann       Chart Reviewed: Yes  Family/Caregiver Present: No    Restrictions:  Restrictions/Precautions: Fall Risk    SUBJECTIVE:   Subjective: \"I want to go home.\"    Pain  Pain  Pre-Pain: 0  Post-Pain: 0     OBJECTIVE:        Bed mobility  Supine to Sit: Independent    Transfers  Sit to Stand: Supervision  Stand to Sit: Supervision    Ambulation  Surface: Level tile  Device: Rolling Walker  Assistance: Stand by assistance  Quality of Gait: very small shuffling steps, downward gaze; vc's for step by step sequencing.  Gait Deviations: Decreased step length;Decreased step height  Distance: 100'    Stairs/Curb  Stairs?: Yes  Stairs  # Steps : 4  Stairs Height: 6\"  Rails: Left ascending  Device: No Device  Assistance: Stand by assistance  Comment: vc's for sequencing and 
Physical Therapy Med Surg Daily Treatment Note  Facility/Department: [address] ORTHO TELE  Room: [place] [address]       NAME: Lin Ferrell  : 1955 (70 y.o.)  MRN: [id number]  CODE STATUS: DNR-CCA    Date of Service: 2025    Patient Diagnosis(es): Stroke-like symptom [R29.90]  Stroke determined by clinical assessment ([facility]) [I63.9]  Encephalopathy acute [G93.40]   No chief complaint on file.    Patient Active Problem List    Diagnosis Date Noted    New onset a-fib ([facility]) 2025    Cerebrovascular accident (CVA) due to embolism of right middle cerebral artery ([facility]) 2025    Encephalopathy acute 2025    Stroke determined by clinical assessment ([facility]) 2025    Pneumonia in infectious disease 2025    Chronic obstructive pulmonary disease ([facility]) 2021        Past Medical History:   Diagnosis Date    Asthma     Cerebral artery occlusion with cerebral infarction ([facility])     2021    Depression     Migraine     Thyroid disease     hypothyroid     Past Surgical History:   Procedure Laterality Date    HYSTERECTOMY (CERVIX STATUS UNKNOWN)      KNEE SURGERY      left knee total replacement    LUMBAR PUNCTURE N/A 2025    by Dr. Ann            Restrictions:  Restrictions/Precautions: Fall Risk    SUBJECTIVE:    Agreeable to treatment, patient sister present for tx    Pain   0/10    OBJECTIVE:        Bed mobility  Rolling to Left: Stand by assistance  Rolling to Right: Stand by assistance  Supine to Sit: Stand by assistance  Sit to Supine: Stand by assistance  Scooting: Stand by assistance  Bed Mobility Comments: hob flat, bed rails used, cues for technique    Transfers  Sit to Stand: Minimal Assistance  Stand to Sit: Minimal Assistance  Bed to Chair: Minimal assistance  Comment: increased time and effort, face to face technique         Neuromuscular Education  Neuromuscular Comments: marcos seated x10, supine x10    PT Exercises  Exercise Treatment: supine bridge x10 ble, SLR x10  Functional 
Physical Therapy Med Surg Daily Treatment Note  Facility/Department: [address] ORTHO TELE  Room: [place] [id number]       NAME: Lin Ferrell  : 1955 (70 y.o.)  MRN: [id number]  CODE STATUS: DNR-CCA    Date of Service: 2025    Patient Diagnosis(es): Stroke-like symptom [R29.90]  Stroke determined by clinical assessment ([facility]) [I63.9]  Encephalopathy acute [G93.40]   No chief complaint on file.    Patient Active Problem List    Diagnosis Date Noted    Encephalopathy acute 2025    Stroke determined by clinical assessment ([facility]) 2025    Pneumonia in infectious disease 2025    COPD exacerbation ([facility]) 2021        Past Medical History:   Diagnosis Date    Asthma     Cerebral artery occlusion with cerebral infarction ([facility])     2021    Depression     Migraine     Thyroid disease     hypothyroid     Past Surgical History:   Procedure Laterality Date    HYSTERECTOMY (CERVIX STATUS UNKNOWN)      KNEE SURGERY      left knee total replacement       Chart Reviewed: Yes    Restrictions:       SUBJECTIVE:   Subjective: I have to use the bathroom.    Pain  Pain  Pre-Pain: 0  Post-Pain: 0     OBJECTIVE:   Orientation  Orientation Level: Oriented to person  Cognition  Cognition Comment: follows simple one step commands    Bed mobility  Rolling to Left: Partial/Moderate assistance  Supine to Sit: Partial/Moderate assistance;Substantial/Maximal assistance  Sit to Supine: Partial/Moderate assistance;Substantial/Maximal assistance;2 Person assistance  Bed Mobility Comments: Bed flat with use of hand rails; hand over hand assist for initiation and sequencing. Increased time to complete. vc;s for sequencing and technique    Transfers  Sit to Stand: Partial/Moderate assistance;2 Person Assistance  Stand to Sit: Partial/Moderate assistance;2 Person Assistance  Bed to Chair: Partial/Moderate assistance;2 Person Assistance  Comment: vcs' for hand placement and technique with WW;    Ambulation  Surface: Level 
Physical Therapy Med Surg Initial Assessment  Facility/Department: [address] ORTHO TELE  Room: [place] [id number]       NAME: Lin Ferrell  : 1955 (70 y.o.)  MRN: [id number]  CODE STATUS: DNR-CCA    Date of Service: 2025    Patient Diagnosis(es): Stroke-like symptom [R29.90]  Stroke determined by clinical assessment ([facility]) [I63.9]  Encephalopathy acute [G93.40]   No chief complaint on file.    Patient Active Problem List    Diagnosis Date Noted    Encephalopathy acute 2025    Stroke determined by clinical assessment ([facility]) 2025    Pneumonia in infectious disease 2025    COPD exacerbation ([facility]) 2021        Past Medical History:   Diagnosis Date    Asthma     Cerebral artery occlusion with cerebral infarction ([facility])     2021    Depression     Migraine     Thyroid disease     hypothyroid     Past Surgical History:   Procedure Laterality Date    HYSTERECTOMY (CERVIX STATUS UNKNOWN)      KNEE SURGERY      left knee total replacement       Chart Reviewed: Yes  Diagnosis: Stroke determined by clinical assessment ([facility])  Other (Comment): Pt responds w/ intermittent yes or no's  General  General Comments: Pt supine in bed upon arrival    Restrictions:  Activity Level: Up as Tolerated  Restrictions/Precautions  Restrictions/Precautions: Fall Risk;NPO  Activity Level: Up as Tolerated    SUBJECTIVE:   Subjective: Pt unable to give a subjective, however pt did read [facility]'s nametag and state \"Rishi\"    Pain  Pain  Post-Pain:  (unable to formally assess, pt reports having back pain when asked)       Prior Level of Function:  Social/Functional History  Lives With:  (Sister)  Additional Comments: Pt unable to provide PLOF or home set up    OBJECTIVE:   Hearing: Within Functional Limits    Cognition:  Orientation Level: Oriented to person  Follows Commands: Impaired (pt follows simple commands intermittently)  Cognition Comment: follows simple one step commands    Observation/Palpation  Posture: 
Physical Therapy Missed Treatment   Facility/Department: [facility] MED SURG W286/W286-01    NAME: Lin Ferrell    : 1955 (70 y.o.)  MRN: [id number]    Account: [id number]  Gender: female    Chart reviewed, attempted PT at 10:21. Patient unavailable 2° to:    [] Hold per nsg request    [] Pt declined     [] Nsg notified   [] Other notified    [x] Pt.. off floor for test/procedure. Pt having LP today and was a rapid response earlier for SVT. Will hold tx today and resume tomorrow.     [] Pt. Unavailable       Will attempt PT treatment again at earliest convenience.      Electronically signed by Mary Brooks PTA on 25 at 10:21 AM EDT    
Progress Note  Patient: Lin Ferrell  Unit/Bed: W286/W286-01  YOB: 1955  MRN: [id number]  Acct: [id number]   Admitting Diagnosis: Stroke-like symptom [R29.90]  Stroke determined by clinical assessment ([facility]) [I63.9]  Encephalopathy acute [G93.40]  Date:  7/19/2025  Hospital Day: 5    Subjective  Patient resting in bed comfortably during time of examination. She appears to be in no acute distress. Denies any chest pain, palpitations or shortness of breath.   Sister at bedside.   Afib diagnosis was discussed extensively with patient and sister. Discussed that patient will need to follow with cardiology upon discharge.   Tele: sinus rhythm     Review of Systems:   As noted in the HPI*      Physical Examination:    /63   Pulse 52   Temp 98.2 °F (36.8 °C) (Oral)   Resp 18   Ht 1.666 m (5' 5.59\")   Wt 86.7 kg (191 lb 3.2 oz)   SpO2 96%   BMI 31.25 kg/m²    Physical Exam  Constitutional:       General: She is not in acute distress.     Appearance: She is obese.   HENT:      Head: Normocephalic and atraumatic.      Nose: Nose normal.   Eyes:      Conjunctiva/sclera: Conjunctivae normal.   Cardiovascular:      Rate and Rhythm: Normal rate and regular rhythm.   Pulmonary:      Effort: Pulmonary effort is normal. No respiratory distress.   Skin:     General: Skin is warm.   Neurological:      Mental Status: She is alert.   Psychiatric:         Mood and Affect: Mood normal.         LABS:  CBC:   Lab Results   Component Value Date/Time    WBC 17.1 07/25/2025 08:19 AM    RBC 4.78 07/25/2025 08:19 AM    RBC 3.87 06/02/2012 05:38 PM    HGB 13.6 07/25/2025 08:19 AM    HCT 41.9 07/25/2025 08:19 AM    MCV 87.7 07/25/2025 08:19 AM    MCH 28.5 07/25/2025 08:19 AM    MCHC 32.5 07/25/2025 08:19 AM    RDW 14.8 07/25/2025 08:19 AM     07/25/2025 08:19 AM    MPV 7.7 09/22/2013 02:11 PM     CBC with Differential:   Lab Results   Component Value Date/Time    WBC 17.1 07/25/2025 08:19 AM    RBC 4.78 07/25/2025 
Progress Note  Patient: Lin Ferrell  Unit/Bed: W286/W286-01  YOB: 1955  MRN: [id number]  Acct: [id number]   Admitting Diagnosis: Stroke-like symptom [R29.90]  Stroke determined by clinical assessment ([facility]) [I63.9]  Encephalopathy acute [G93.40]  Date:  7/19/2025  Hospital Day: 7    Subjective  Patient resting in bed comfortably during time of examination. She appears to be in no acute distress. Denies any chest pain, palpitations or shortness of breath.   Sister at bedside.   Afib diagnosis was discussed extensively with patient and sister. Discussed that patient will need to follow with cardiology upon discharge.   Tele: sinus rhythm     7/26/2025: Hemodynamically stable.  No new issues overnight    7/27/2025: Denies any chest pain or shortness of breath.  Hemodynamically stable    Review of Systems:   As noted in the HPI*      Physical Examination:    /66   Pulse 58   Temp 98.2 °F (36.8 °C) (Oral)   Resp 18   Ht 1.666 m (5' 5.59\")   Wt 86.7 kg (191 lb 3.2 oz)   SpO2 94%   BMI 31.25 kg/m²    Physical Exam  Constitutional:       General: She is not in acute distress.     Appearance: She is obese.   HENT:      Head: Normocephalic and atraumatic.      Nose: Nose normal.   Eyes:      Conjunctiva/sclera: Conjunctivae normal.   Cardiovascular:      Rate and Rhythm: Normal rate and regular rhythm.   Pulmonary:      Effort: Pulmonary effort is normal. No respiratory distress.   Skin:     General: Skin is warm.   Neurological:      Mental Status: She is alert.   Psychiatric:         Mood and Affect: Mood normal.         LABS:  CBC:   Lab Results   Component Value Date/Time    WBC 13.3 07/27/2025 05:15 AM    RBC 4.63 07/27/2025 05:15 AM    RBC 3.87 06/02/2012 05:38 PM    HGB 13.2 07/27/2025 05:15 AM    HCT 40.9 07/27/2025 05:15 AM    MCV 88.3 07/27/2025 05:15 AM    MCH 28.5 07/27/2025 05:15 AM    MCHC 32.3 07/27/2025 05:15 AM    RDW 14.6 07/27/2025 05:15 AM     07/27/2025 05:15 AM    MPV 7.7 
Progress Note  Patient: Lin Ferrell  Unit/Bed: W286/W286-01  YOB: 1955  MRN: [id number]  Acct: [id number]   Admitting Diagnosis: Stroke-like symptom [R29.90]  Stroke determined by clinical assessment ([facility]) [I63.9]  Encephalopathy acute [G93.40]  Date:  7/19/2025  Hospital Day: 4    Subjective  Patient resting in bed comfortably during time of examination.  She appears to be in no acute distress.  Sister at bedside.  Denies any chest pain, palpitations or shortness of breath.  Telemetry: Sinus rhythm 62 bpm    Review of Systems:   As noted in the HPI*      Physical Examination:    /68   Pulse 56   Temp 97.5 °F (36.4 °C) (Oral)   Resp 16   Ht 1.666 m (5' 5.59\")   Wt 86.7 kg (191 lb 3.2 oz)   SpO2 96%   BMI 31.25 kg/m²    Physical Exam  Constitutional:       General: She is not in acute distress.     Interventions: Nasal cannula in place.   HENT:      Head: Normocephalic and atraumatic.      Nose: Nose normal.   Eyes:      Conjunctiva/sclera: Conjunctivae normal.   Cardiovascular:      Rate and Rhythm: Normal rate and regular rhythm.   Pulmonary:      Effort: Pulmonary effort is normal. No respiratory distress.   Musculoskeletal:      Left lower leg: No edema.   Skin:     General: Skin is warm.   Neurological:      Mental Status: She is alert.   Psychiatric:         Mood and Affect: Mood normal.         Behavior: Behavior normal.         LABS:  CBC:   Lab Results   Component Value Date/Time    WBC 24.6 07/24/2025 09:16 AM    RBC 5.04 07/24/2025 09:16 AM    RBC 3.87 06/02/2012 05:38 PM    HGB 14.8 07/24/2025 09:16 AM    HCT 44.1 07/24/2025 09:16 AM    MCV 87.5 07/24/2025 09:16 AM    MCH 29.4 07/24/2025 09:16 AM    MCHC 33.6 07/24/2025 09:16 AM    RDW 14.9 07/24/2025 09:16 AM     07/24/2025 09:16 AM    MPV 7.7 09/22/2013 02:11 PM     CBC with Differential:   Lab Results   Component Value Date/Time    WBC 24.6 07/24/2025 09:16 AM    RBC 5.04 07/24/2025 09:16 AM    RBC 3.87 06/02/2012 
Progress Note  Patient: Lin Ferrell  Unit/Bed: W286/W286-01  YOB: 1955  MRN: [id number]  Acct: [id number]   Admitting Diagnosis: Stroke-like symptom [R29.90]  Stroke determined by clinical assessment ([facility]) [I63.9]  Encephalopathy acute [G93.40]  Date:  7/19/2025  Hospital Day: 6    Subjective  Patient resting in bed comfortably during time of examination. She appears to be in no acute distress. Denies any chest pain, palpitations or shortness of breath.   Sister at bedside.   Afib diagnosis was discussed extensively with patient and sister. Discussed that patient will need to follow with cardiology upon discharge.   Tele: sinus rhythm     7/26/2025: Hemodynamically stable.  No new issues overnight    Review of Systems:   As noted in the HPI*      Physical Examination:    /68   Pulse 63   Temp 98.8 °F (37.1 °C) (Oral)   Resp 18   Ht 1.666 m (5' 5.59\")   Wt 86.7 kg (191 lb 3.2 oz)   SpO2 92%   BMI 31.25 kg/m²    Physical Exam  Constitutional:       General: She is not in acute distress.     Appearance: She is obese.   HENT:      Head: Normocephalic and atraumatic.      Nose: Nose normal.   Eyes:      Conjunctiva/sclera: Conjunctivae normal.   Cardiovascular:      Rate and Rhythm: Normal rate and regular rhythm.   Pulmonary:      Effort: Pulmonary effort is normal. No respiratory distress.   Skin:     General: Skin is warm.   Neurological:      Mental Status: She is alert.   Psychiatric:         Mood and Affect: Mood normal.         LABS:  CBC:   Lab Results   Component Value Date/Time    WBC 17.1 07/25/2025 08:19 AM    RBC 4.78 07/25/2025 08:19 AM    RBC 3.87 06/02/2012 05:38 PM    HGB 13.6 07/25/2025 08:19 AM    HCT 41.9 07/25/2025 08:19 AM    MCV 87.7 07/25/2025 08:19 AM    MCH 28.5 07/25/2025 08:19 AM    MCHC 32.5 07/25/2025 08:19 AM    RDW 14.8 07/25/2025 08:19 AM     07/25/2025 08:19 AM    MPV 7.7 09/22/2013 02:11 PM     CBC with Differential:   Lab Results   Component Value 
Pt with multiple loose incontinent BM this shift. She thinks it may be d/t antibiotic use.  
Referral received for [facility].  Benefits obtained.  Precert sent.  Awaiting response for insurance company.   
Upon assessment, pt able to state her first name and that she was born in [birth year] but does not complete her thoughts. She does state she is in the hospital but unable to explain why. Does not answer when asked current month or year. Pt asking for a drink of water. Given pt's increased alertness and ability to follow most directions, water was given. No swallowing difficulties noted.     Pt attempting to get OOB without assistance. Avasys already in room for safety. Pt not able to state why she wants to get up. Repositioned pt and educated on importance of staying in bed.  
    No results for input(s): \"PHART\", \"FFF4IAW\", \"PO2ART\", \"GMQ1OSH\", \"BEART\", \"G7IMLLVN\" in the last 72 hours.      O2 Device: None (Room air)  O2 Flow Rate (L/min): 0 L/min    ADULT DIET; Regular  ADULT ORAL NUTRITION SUPPLEMENT; Breakfast, Lunch, Dinner; Standard High Calorie/High Protein Oral Supplement     MEDICATIONS during current hospitalization:    Continuous Infusions:   sodium chloride      sodium chloride         Scheduled Meds:   metoprolol tartrate  75 mg Oral BID    apixaban  5 mg Oral BID    levETIRAcetam  750 mg Oral BID    lactobacillus  1 capsule Oral Daily with breakfast    levothyroxine  137 mcg Oral Daily    pantoprazole  40 mg Oral QAM AC    venlafaxine  150 mg Oral Daily    budesonide-formoterol  2 puff Inhalation BID RT    sodium chloride flush  5-40 mL IntraVENous 2 times per day    atorvastatin  80 mg Oral Nightly    sodium chloride flush  5-40 mL IntraVENous 2 times per day       PRN Meds:metoprolol, calcium carbonate, labetalol, sodium chloride flush, sodium chloride, ipratropium 0.5 mg-albuterol 2.5 mg, sodium chloride, ondansetron **OR** ondansetron, polyethylene glycol, acetaminophen **OR** acetaminophen, LORazepam, hydrALAZINE    Radiology  CT chest shows right lower lobe infiltrate      IMPRESSION AND SUGGESTION:  Patient is at risk due to   Right lower lobe  scaring and pleural thickening   COPD, no signs of exacerbation    Recommendation  Continue pulmonary hygiene  O2 if needed to keep sat 90 to 92%    Continue current inhalers       Electronically signed by David Arteaga MD,  FCCP   on 7/27/2025 at 10:22 AM    
Estimate     PSV Ratio    stenosis      <180    No plaque      <2.0          Normal      <180  <50% plaque    <2.0           <50%      180-230  >50% plaque  2.0 - 4.0    50-69%      >230    >50% plaque      >4.0         >70%      ** -180 cm/sec and ICA/CCA PSV Ratio = 2.0 is also consistent with   50-69% stenosis.           Assessment/Plan:    Active Hospital Problems    Diagnosis Date Noted    New onset a-fib ([facility]) [I48.91] 07/24/2025    Cerebrovascular accident (CVA) due to embolism of right middle cerebral artery ([facility]) [I63.411] 07/22/2025    Encephalopathy acute [G93.40] 07/20/2025    Stroke determined by clinical assessment ([facility]) [I63.9] 07/19/2025    Chronic obstructive pulmonary disease ([facility]) [J44.9] 12/27/2021     Acute metabolic encephalopathy  - pt remains confused, baseline is A&Ox4  - neurology following, appreciate their assistance  - most likely related to PNA as family states she becomes very confused in the setting of active infection, cannot rule out seizure of CVA   - CT head, carotid duplex, ABG, MRI brain unremarkable  - evidence of old infarcts on MRI, continue ASA and statin  - EEG completed with report pending. Dr. Cheema reviewed EEG with concern for abnormal spikes, continue keppra BID  - plan for LP, acyclovir added for viral prophylaxis  - SLP following, recommend NPO at this time due to pt's confusion  - encephalopathy improving    RLL PNA  - consolidation with air bronchograms on CTA chest  - continue ceftriaxone and will add doxcycline  - continue oxygen supplementation as needed, goal SpO2 >90%, wean as able    HTN: continue home lisinopril and metoprolol  HLD: continue home statin  Hypothyroidism: continue home levothyroxine  Depression: continue home venlafaxine     Additional work up or/and treatment plan may be added today or then after based on clinical progression. I am managing a portion of pt care. Some medical issues are handled by other specialists. Additional work up

## 2025-08-02 LAB
MISCELLANEOUS LAB TEST ORDER: NORMAL
WHOPPER PROMPT: NORMAL

## 2025-08-04 LAB
MISCELLANEOUS LAB TEST ORDER: NORMAL
WHOPPER PROMPT: NORMAL

## 2025-08-06 ENCOUNTER — APPOINTMENT (OUTPATIENT)
Dept: PRIMARY CARE | Facility: CLINIC | Age: 70
End: 2025-08-06
Payer: MEDICARE

## 2025-08-06 VITALS
RESPIRATION RATE: 18 BRPM | WEIGHT: 177 LBS | HEIGHT: 62 IN | HEART RATE: 84 BPM | SYSTOLIC BLOOD PRESSURE: 120 MMHG | TEMPERATURE: 98.5 F | DIASTOLIC BLOOD PRESSURE: 72 MMHG | BODY MASS INDEX: 32.57 KG/M2 | OXYGEN SATURATION: 97 %

## 2025-08-06 DIAGNOSIS — J18.9 PNEUMONIA DUE TO INFECTIOUS ORGANISM, UNSPECIFIED LATERALITY, UNSPECIFIED PART OF LUNG: ICD-10-CM

## 2025-08-06 DIAGNOSIS — I48.19 PERSISTENT ATRIAL FIBRILLATION (MULTI): ICD-10-CM

## 2025-08-06 DIAGNOSIS — R73.9 HYPERGLYCEMIA: ICD-10-CM

## 2025-08-06 DIAGNOSIS — Z86.73 HISTORY OF CEREBROVASCULAR ACCIDENT (CVA) DUE TO ISCHEMIA: ICD-10-CM

## 2025-08-06 DIAGNOSIS — I10 HTN (HYPERTENSION), BENIGN: ICD-10-CM

## 2025-08-06 DIAGNOSIS — G93.40 ENCEPHALOPATHY, UNSPECIFIED TYPE: Primary | ICD-10-CM

## 2025-08-06 PROBLEM — I63.511 CEREBROVASCULAR ACCIDENT (CVA) DUE TO STENOSIS OF RIGHT MIDDLE CEREBRAL ARTERY (MULTI): Status: ACTIVE | Noted: 2023-04-17

## 2025-08-06 PROCEDURE — 1159F MED LIST DOCD IN RCRD: CPT | Performed by: FAMILY MEDICINE

## 2025-08-06 PROCEDURE — 3008F BODY MASS INDEX DOCD: CPT | Performed by: FAMILY MEDICINE

## 2025-08-06 PROCEDURE — 3078F DIAST BP <80 MM HG: CPT | Performed by: FAMILY MEDICINE

## 2025-08-06 PROCEDURE — G2211 COMPLEX E/M VISIT ADD ON: HCPCS | Performed by: FAMILY MEDICINE

## 2025-08-06 PROCEDURE — 99214 OFFICE O/P EST MOD 30 MIN: CPT | Performed by: FAMILY MEDICINE

## 2025-08-06 PROCEDURE — 3074F SYST BP LT 130 MM HG: CPT | Performed by: FAMILY MEDICINE

## 2025-08-06 RX ORDER — METOPROLOL TARTRATE 75 MG/1
75 TABLET ORAL 2 TIMES DAILY
Start: 2025-08-06 | End: 2026-02-02

## 2025-08-06 RX ORDER — LEVETIRACETAM 750 MG/1
750 TABLET ORAL 2 TIMES DAILY
COMMUNITY
Start: 2025-07-28

## 2025-08-06 ASSESSMENT — PATIENT HEALTH QUESTIONNAIRE - PHQ9
SUM OF ALL RESPONSES TO PHQ9 QUESTIONS 1 AND 2: 0
2. FEELING DOWN, DEPRESSED OR HOPELESS: NOT AT ALL
1. LITTLE INTEREST OR PLEASURE IN DOING THINGS: NOT AT ALL

## 2025-08-06 NOTE — PROGRESS NOTES
Subjective   Minoo Aguilera is a 70 y.o. female who presents for Hospital Follow-up.     HPI         She was admitted to [facility] on 7/18/25 for hypoxia, general weakness, pneumonia, encephalopathy, and cerebrovascular accident due to embolism of right middle cerebral artery.  A review of the medical record indicates that she was admitted for a CVA of the right middle cerebral artery and new onset atrial fibrillation.  She had extensive workup which was hematologic and included a CT and MRI of the brain as well as a lumbar puncture.  She was evaluated by neurology for suspected seizures and started on oral Keppra.  She was treated with Rocephin and doxycycline for a right lower lobe pneumonia.  She was started on Lopressor and Eliquis and recommended to follow-up with cardiology.  She is accompanied by her sister who lives with her and provides a significant amount of care.  Her sister provides most of the detail of the hospital visit as Minoo is not sure of the details.  Review of Systems   Visit Vitals  /72   Pulse 84   Temp 36.9 °C (98.5 °F)   Resp 18      Objective   Physical Exam  Constitutional:       Appearance: Normal appearance.   HENT:      Head: Normocephalic.     Eyes:      Conjunctiva/sclera: Conjunctivae normal.       Cardiovascular:      Rate and Rhythm: Normal rate and regular rhythm.      Heart sounds: Normal heart sounds.   Pulmonary:      Effort: Pulmonary effort is normal.      Breath sounds: Normal breath sounds.     Musculoskeletal:      Cervical back: Neck supple.     Skin:     General: Skin is warm and dry.     Neurological:      Mental Status: She is alert.       No data recorded     No data recorded   Patient Health Questionnaire-2 Score: 0 (8/6/2025  2:29 PM)   Assessment & Plan  Encephalopathy, unspecified type  This is a 70-year-old female with multiple medical problems was admitted to the hospital and initially treated for diverticulitis but decompensated and became very  confused.  She was readmitted to the hospital where she was diagnosed with pneumonia.  She completed a course of Rocephin and doxycycline for this and has no pulmonary complaints today.  Initially it was felt that she may have had a CVA but CT imaging and MRA imaging showed no new ischemia.  EEG was somewhat suspicious for epilepsy and so neurology recommended she start on Keppra which she is continuing now.  I recommend she continue this medication until she sees neurology and follow-up.  The hospital team stopped her aspirin and Plavix since she was started on Eliquis.       History of cerebrovascular accident (CVA) due to ischemia  Initially the hospital team felt she was having an acute evolving CVA but MRI and CT scan of the head ruled this out.  She just has her old ischemic injury.  The hospital team did stop her aspirin and Plavix when they restarted her Eliquis.  I will leave the decision making to neurology as far as long-term antiplatelet therapy it is most beneficial for her.       Pneumonia due to infectious organism, unspecified laterality, unspecified part of lung  She completed a course of Rocephin and doxycycline.  Her clinical exam today reveals no rhonchi or rales and she is in no respiratory distress.  This appears to be resolved       Persistent atrial fibrillation (Multi)  This was clearly identified on telemetry and EKG when she was admitted to the hospital but she spontaneously converted to sinus rhythm and is in sinus rhythm today at the time of this examination.  I will continue her beta-blocker at the slightly higher twice daily dosing of 75 mg metoprolol tartrate.  She will also continue her Eliquis.  If she has no more episodes of atrial fibrillation documented cardiology may make the decision that Eliquis is not necessary anymore in the future.  She should continue it for now.       HTN (hypertension), benign  controlled.  Lisinopril was discontinued in the hospital but the current dose  of metoprolol seems to be controlling her blood pressure well  Orders:    metoprolol tartrate (Lopressor) 75 mg tablet; Take 1 tablet by mouth 2 times a day.           Please excuse any errors in grammar or translation related to this dictation. Voice recognition software was utilized to prepare this document.

## 2025-08-06 NOTE — ASSESSMENT & PLAN NOTE
This was clearly identified on telemetry and EKG when she was admitted to the hospital but she spontaneously converted to sinus rhythm and is in sinus rhythm today at the time of this examination.  I will continue her beta-blocker at the slightly higher twice daily dosing of 75 mg metoprolol tartrate.  She will also continue her Eliquis.  If she has no more episodes of atrial fibrillation documented cardiology may make the decision that Eliquis is not necessary anymore in the future.  She should continue it for now.

## 2025-08-06 NOTE — ASSESSMENT & PLAN NOTE
controlled.  Lisinopril was discontinued in the hospital but the current dose of metoprolol seems to be controlling her blood pressure well  Orders:    metoprolol tartrate (Lopressor) 75 mg tablet; Take 1 tablet by mouth 2 times a day.

## 2025-08-06 NOTE — ASSESSMENT & PLAN NOTE
Initially the hospital team felt she was having an acute evolving CVA but MRI and CT scan of the head ruled this out.  She just has her old ischemic injury.  The hospital team did stop her aspirin and Plavix when they restarted her Eliquis.  I will leave the decision making to neurology as far as long-term antiplatelet therapy it is most beneficial for her.

## 2025-08-07 ENCOUNTER — TELEPHONE (OUTPATIENT)
Dept: PRIMARY CARE | Facility: CLINIC | Age: 70
End: 2025-08-07
Payer: MEDICARE

## 2025-08-07 NOTE — TELEPHONE ENCOUNTER
Pt's sister called in requesting a call from you for advice, her sister was seen yesterday and she states today she is acting very confused and she doesn't know what to do.

## 2025-08-11 ENCOUNTER — TELEPHONE (OUTPATIENT)
Dept: PRIMARY CARE | Facility: CLINIC | Age: 70
End: 2025-08-11
Payer: MEDICARE

## 2025-08-12 ENCOUNTER — PATIENT OUTREACH (OUTPATIENT)
Dept: PRIMARY CARE | Facility: CLINIC | Age: 70
End: 2025-08-12
Payer: MEDICARE

## 2025-08-12 DIAGNOSIS — E11.42 DIABETIC PERIPHERAL NEUROPATHY (MULTI): ICD-10-CM

## 2025-08-12 DIAGNOSIS — F41.9 ANXIETY: ICD-10-CM

## 2025-08-19 ENCOUNTER — OFFICE VISIT (OUTPATIENT)
Dept: CARDIOLOGY CLINIC | Age: 70
End: 2025-08-19
Payer: MEDICARE

## 2025-08-19 VITALS
OXYGEN SATURATION: 94 % | DIASTOLIC BLOOD PRESSURE: 72 MMHG | BODY MASS INDEX: 30.2 KG/M2 | HEART RATE: 62 BPM | SYSTOLIC BLOOD PRESSURE: 110 MMHG | WEIGHT: 184.8 LBS

## 2025-08-19 DIAGNOSIS — I48.0 PAROXYSMAL ATRIAL FIBRILLATION (HCC): ICD-10-CM

## 2025-08-19 DIAGNOSIS — R94.31 ABNORMAL ELECTROCARDIOGRAPHY: ICD-10-CM

## 2025-08-19 DIAGNOSIS — I48.91 NEW ONSET A-FIB (HCC): Primary | ICD-10-CM

## 2025-08-19 DIAGNOSIS — R06.02 SHORTNESS OF BREATH: ICD-10-CM

## 2025-08-19 PROCEDURE — 93000 ELECTROCARDIOGRAM COMPLETE: CPT | Performed by: INTERNAL MEDICINE

## 2025-08-19 PROCEDURE — 1126F AMNT PAIN NOTED NONE PRSNT: CPT | Performed by: INTERNAL MEDICINE

## 2025-08-19 PROCEDURE — 99214 OFFICE O/P EST MOD 30 MIN: CPT | Performed by: INTERNAL MEDICINE

## 2025-08-19 PROCEDURE — 1159F MED LIST DOCD IN RCRD: CPT | Performed by: INTERNAL MEDICINE

## 2025-08-19 PROCEDURE — 1123F ACP DISCUSS/DSCN MKR DOCD: CPT | Performed by: INTERNAL MEDICINE

## 2025-08-19 RX ORDER — IPRATROPIUM BROMIDE AND ALBUTEROL SULFATE 2.5; .5 MG/3ML; MG/3ML
3 SOLUTION RESPIRATORY (INHALATION)
COMMUNITY
Start: 2025-03-04 | End: 2026-03-04

## 2025-08-19 RX ORDER — PERPHENAZINE 16 MG
600 TABLET ORAL DAILY
COMMUNITY

## 2025-08-19 RX ORDER — METOPROLOL TARTRATE 75 MG/1
75 TABLET ORAL 2 TIMES DAILY
Qty: 180 TABLET | Refills: 3 | Status: SHIPPED | OUTPATIENT
Start: 2025-08-19

## 2025-08-19 RX ORDER — CELECOXIB 200 MG/1
200 CAPSULE ORAL DAILY
COMMUNITY
Start: 2025-07-18

## 2025-08-19 RX ORDER — LISINOPRIL 10 MG/1
10 TABLET ORAL DAILY
COMMUNITY
Start: 2025-06-29

## 2025-08-19 RX ORDER — PROMETHAZINE HYDROCHLORIDE 25 MG/1
25 TABLET ORAL EVERY 6 HOURS PRN
COMMUNITY
Start: 2025-07-11

## 2025-08-22 ENCOUNTER — TELEPHONE (OUTPATIENT)
Dept: PRIMARY CARE | Facility: CLINIC | Age: 70
End: 2025-08-22
Payer: MEDICARE

## 2025-08-22 DIAGNOSIS — M19.90 OSTEOARTHRITIS, UNSPECIFIED OSTEOARTHRITIS TYPE, UNSPECIFIED SITE: Primary | ICD-10-CM

## 2025-08-22 RX ORDER — PREDNISONE 50 MG/1
50 TABLET ORAL DAILY
Qty: 5 TABLET | Refills: 0 | Status: SHIPPED | OUTPATIENT
Start: 2025-08-22 | End: 2025-08-27

## 2025-09-04 DIAGNOSIS — K21.9 GASTROESOPHAGEAL REFLUX DISEASE WITHOUT ESOPHAGITIS: ICD-10-CM

## 2025-09-04 RX ORDER — PROMETHAZINE HYDROCHLORIDE 25 MG/1
25 TABLET ORAL EVERY 6 HOURS PRN
Qty: 30 TABLET | Refills: 0 | Status: SHIPPED | OUTPATIENT
Start: 2025-09-04

## 2025-11-20 ENCOUNTER — APPOINTMENT (OUTPATIENT)
Dept: PRIMARY CARE | Facility: CLINIC | Age: 70
End: 2025-11-20
Payer: MEDICARE